# Patient Record
Sex: FEMALE | Race: WHITE | NOT HISPANIC OR LATINO
[De-identification: names, ages, dates, MRNs, and addresses within clinical notes are randomized per-mention and may not be internally consistent; named-entity substitution may affect disease eponyms.]

---

## 2017-12-14 ENCOUNTER — APPOINTMENT (OUTPATIENT)
Dept: OTOLARYNGOLOGY | Facility: CLINIC | Age: 72
End: 2017-12-14

## 2018-02-08 ENCOUNTER — APPOINTMENT (OUTPATIENT)
Dept: OTOLARYNGOLOGY | Facility: CLINIC | Age: 73
End: 2018-02-08

## 2018-06-04 ENCOUNTER — APPOINTMENT (OUTPATIENT)
Dept: OTOLARYNGOLOGY | Facility: CLINIC | Age: 73
End: 2018-06-04
Payer: MEDICARE

## 2018-06-04 VITALS
DIASTOLIC BLOOD PRESSURE: 73 MMHG | SYSTOLIC BLOOD PRESSURE: 117 MMHG | WEIGHT: 130 LBS | HEIGHT: 63 IN | BODY MASS INDEX: 23.04 KG/M2 | HEART RATE: 63 BPM

## 2018-06-04 DIAGNOSIS — J31.0 CHRONIC RHINITIS: ICD-10-CM

## 2018-06-04 DIAGNOSIS — J34.2 DEVIATED NASAL SEPTUM: ICD-10-CM

## 2018-06-04 DIAGNOSIS — H69.82 OTHER SPECIFIED DISORDERS OF EUSTACHIAN TUBE, LEFT EAR: ICD-10-CM

## 2018-06-04 DIAGNOSIS — H90.3 SENSORINEURAL HEARING LOSS, BILATERAL: ICD-10-CM

## 2018-06-04 DIAGNOSIS — H61.23 IMPACTED CERUMEN, BILATERAL: ICD-10-CM

## 2018-06-04 PROCEDURE — 69210 REMOVE IMPACTED EAR WAX UNI: CPT

## 2018-06-04 PROCEDURE — 92557 COMPREHENSIVE HEARING TEST: CPT

## 2018-06-04 PROCEDURE — 31231 NASAL ENDOSCOPY DX: CPT

## 2018-06-04 PROCEDURE — 99204 OFFICE O/P NEW MOD 45 MIN: CPT | Mod: 25

## 2018-06-04 PROCEDURE — 92567 TYMPANOMETRY: CPT

## 2019-07-31 ENCOUNTER — APPOINTMENT (OUTPATIENT)
Dept: ORTHOPEDIC SURGERY | Facility: CLINIC | Age: 74
End: 2019-07-31
Payer: MEDICARE

## 2019-07-31 DIAGNOSIS — Z96.651 PRESENCE OF RIGHT ARTIFICIAL KNEE JOINT: ICD-10-CM

## 2019-07-31 DIAGNOSIS — M25.561 PAIN IN RIGHT KNEE: ICD-10-CM

## 2019-07-31 DIAGNOSIS — M25.562 PAIN IN RIGHT KNEE: ICD-10-CM

## 2019-07-31 DIAGNOSIS — M17.12 UNILATERAL PRIMARY OSTEOARTHRITIS, LEFT KNEE: ICD-10-CM

## 2019-07-31 PROCEDURE — 99204 OFFICE O/P NEW MOD 45 MIN: CPT

## 2019-07-31 PROCEDURE — 73564 X-RAY EXAM KNEE 4 OR MORE: CPT | Mod: LT

## 2019-07-31 PROCEDURE — 73562 X-RAY EXAM OF KNEE 3: CPT | Mod: RT

## 2019-07-31 NOTE — ADDENDUM
[FreeTextEntry1] : This note was written by Lexy Freedman on 07/31/2019 acting as scribe for Dr. Rajiv Vidales M.D.\par \par I, Dr. Rajiv Vidales M.D., have read and attest that all the information, medical decision making and discharge instructions within are true and accurate.\par

## 2019-07-31 NOTE — PHYSICAL EXAM
[de-identified] : General appearance: well nourished and hydrated, pleasant, alert and oriented x 3, cooperative.\par HEENT: Normocephalic, EOM intact, Nasal septum midline, Oral cavity clear, External auditory canal clear.\par Cardiovascular: no apparent abnormalities, no lower leg edema, no varicosities, pedal pulses are palpable.\par Lymphatics Lymph nodes: none palpated, Lymphedema: not present.\par Neurologic: sensation is normal, no muscle weakness in upper or lower extremities, patella tendon reflexes intact .\par Dermatologic: multiple ecchymosis of the upper and lower extremities, otherwise no apparent skin lesions, moist, warm, no rash.\par Spine: cervical spine appears normal and moves freely, thoracic spine appears normal and moves freely, limited mobility at lumbosacral spine with tenderness\par Gait: nonantalgic.\par \par Left knee\par Inspection: no effusion or erythema.\par Wounds: none.\par Alignment: normal.\par Palpation: medial tenderness on palpation.\par ROM active (in degrees): 5 degree flexion contracture, 5-140 with crepitus \par Ligamentous laxity: all ligaments appear stable,, negative ant. drawer test, negative post. drawer test, stable to varus stress test, stable to valgus stress test. negative Lachman's test, negative pivot shift test\par Meniscal Test: negative McMurrays, negative Shane.\par Patellofemoral Alignment Test: Q angle-, normal.\par Muscle Test: good quad strength.\par Leg examination: calf is soft and non-tender.\par \par Right knee\par Inspection: no effusion or erythema.\par Wounds: healed midline incision \par Alignment: normal.\par Palpation: no specific tenderness on palpation.\par ROM active (in degrees): 0-130 \par Ligamentous laxity: stable in full extension, stable to varus and valgus stress, no instability or ligamentous laxity noted. \par Patellofemoral Alignment Test: Q angle-, normal.\par Muscle Test: good quad strength.\par Leg examination: calf is soft and non-tender.\par \par Left hip\par Inspection: No swelling or ecchymosis.\par Wounds: none.\par Palpation: non-tender.\par Stability: no instability.\par Strength: 5/5 all motor groups.\par ROM: no pain with FROM.\par Leg length: equal.\par \par Right hip\par Inspection: No swelling or ecchymosis.\par Wounds: none.\par Palpation: non-tender.\par Stability: no instability.\par Strength: 5/5 all motor groups.\par ROM: no pain with FROM.\par Leg length: equal.\par \par Left ankle\par Inspection: no erythema noted, no swelling noted.\par Palpation: no pain on palpation .\par ROM: FROM without crepitus.\par Muscle strength: 5/5.\par Stability: no instability noted.\par \par Right ankle\par Inspection: no erythema noted, no swelling noted.\par ROM: FROM without crepitus.\par Palpation: no pain on palpation .\par Muscle strength: 5/5.\par Stability: no instability noted.\par \par Left foot\par Inspection: hammer toes at number 2 and 3, otherwise color, texture and turgor are normal.\par ROM: full range of motion of all joints without pain or crepitus.\par Palpation: no tenderness.\par Stability: no instability noted.\par \par Right foot\par Inspection: hammer toe #2 with crossover,  otherwise color, texture and turgor are normal.\par ROM: limited PIP joint movement of the second toe,otherwise full range of motion of all joints without pain or crepitus.\par Palpation: no tenderness.\par Stability: no instability noted.\par \par bilateral feet: hallux valgus \par \par Left shoulder\par Inspection: no muscle asymmetry, no atrophy.\par Palpation: no tenderness noted, ACJ non-tender.\par ROM:. limited total elevation, pain with overhead movement\par Strength testing): weakness to external rotation.\par Stability test: ant. apprehension negative, post. apprehension negative, relocation test negative.\par Impingement Test: positive NEER.\par \par Right shoulder\par Inspection: no muscle asymmetry, no atrophy.\par Palpation: no tenderness noted, ACJ non-tender.\par ROM: limited total elevation\par Strength testing): anterior deltoid, supraspinatus, infraspinatus, subscapularis all 5/5.\par Stability test: ant. apprehension negative, post. apprehension negative, relocation test negative.\par Impingement Test: negative NEER.\par Surgical Wounds: none.\par \par Left elbow\par Inspection: negative swelling.\par Wounds: none.\par Palpation: non-tender.\par ROM: full ROM.\par Strength: 5/5 all groups.\par Stability: no instability.\par Mass: none.\par \par Right elbow\par Inspection: negative swelling.\par Wounds: none.\par Palpation: non-tender.\par ROM: full ROM.\par Strength: 5/5 all groups.\par Stability: no instability.\par Mass: none.\par \par Left wrist\par Inspection: negative swelling.\par Wound: none.\par Palpation (bone): no tenderness.\par ROM: full ROM.\par Strength: full , good.\par \par Right wrist\par Inspection: negative swelling.\par Wound: none.\par Palpation (bone): no tenderness.\par ROM: full ROM.\par Strength: full , good.\par \par Left hand\par Inspection: no skin changes, normal appearance.\par Wounds: none.\par Strength: full , able to make full fist.\par Sensation: light touch intact all fingers and thumb.\par Vascular: good capillary refill < 3 seconds, all fingers and thumb.\par Mass: none.\par \par Right hand\par Inspection: no skin changes, normal appearance. \par Wounds: none.\par Palpation: non-tender throughout.\par Strength: full , able to make full fist.\par Sensation: light touch intact all fingers and thumb.\par Vascular: good capillary refill < 3 seconds, all fingers and thumb.\par Mass: none. [de-identified] : Left knee xrays, standing AP/Lateral, Merchant, and 45 degree PA standing view, taken at the office today shows diffuse degenerative arthritis, lateral joint space narrowing, sclerosis especially on the Newton view , bone on bone, marginal osteophytes, patella sits at an appropriate height in a central position with joint space narrowing and osteophyte formation, Kellgren and Spencer grade 3-4,  enchondroma or bone infarct in metaphysis, linear appearance \par \par Right knee xrays, AP, lateral, merchant view taken at the office today demonstrates a total knee replacement in satisfactory position and alignment. No evidence of loosening. The patella sits in a central position

## 2019-07-31 NOTE — HISTORY OF PRESENT ILLNESS
[de-identified] : 73 year old female presents for initial evaluation of her bilateral knees. She had a right TKR 15 years ago at [facility], and states the surgeon no longer practices. She states her right knee is overall doing well aside from occasional achiness. Her main complaint is left knee pain for the past 6 months. Patient denies any specific injury. She describes anterior knee pain with activities like stairs and walking. She states her walking is limited due to both her left knee pain and lower back pain due to spinal stenosis. She endorses buckling clicking, and locking but denies any other mechanical symptoms. Patient takes the stairs one at a time using the handrail. She uses ibuprofen 800 mg daily, and has no history of left knee treatment. Patient uses Oxycodone prn for her back. She has been under care of a spine specialist and is not a surgical candidate. She has tried epidural injections, PT, spine stimulators, as well as pain management with limited improvement of her back pain. Today, she would like to discuss her treatment options with Dr. Vidales for her left knee.

## 2019-07-31 NOTE — DISCUSSION/SUMMARY
[de-identified] : Discussed at length the nature of the patients condition. Her right TKR done at [facility] is doing well now at 15 years. I reviewed x-ray films with them and I have reassured her that her implants are functioning well. Their left knee symptoms appear secondary to degenerative arthritis. We discussed at length the natural history of LEFT knee degenerative arthritis and reviewed non-operative and operative treatment. Due to the pain, failure of prior nonoperative treatment including injections, NSAIDs, and physiotherapy, and associated disability I recommend a LEFT total knee replacement. The risks, benefits, convalescence and alternatives were reviewed. Numerous questions were asked and answered. Models were used as an educational tool. We did discuss implant choice and fixation, with shared decision making with the patient. Surgery will be scheduled at a convenient time. Preop medical clearance. This was explained in the presence of their . I have referred her to Dr. Ambrose for evaluation of her left shoulder. I have referred her to Dr. López for evaluation of her feet.

## 2019-08-16 ENCOUNTER — APPOINTMENT (OUTPATIENT)
Dept: ORTHOPEDIC SURGERY | Facility: CLINIC | Age: 74
End: 2019-08-16
Payer: MEDICARE

## 2019-08-16 DIAGNOSIS — M25.511 PAIN IN RIGHT SHOULDER: ICD-10-CM

## 2019-08-16 DIAGNOSIS — G89.29 PAIN IN RIGHT SHOULDER: ICD-10-CM

## 2019-08-16 DIAGNOSIS — M25.512 PAIN IN LEFT SHOULDER: ICD-10-CM

## 2019-08-16 DIAGNOSIS — G89.29 PAIN IN LEFT SHOULDER: ICD-10-CM

## 2019-08-16 PROCEDURE — 73030 X-RAY EXAM OF SHOULDER: CPT | Mod: 50

## 2019-08-16 PROCEDURE — 20610 DRAIN/INJ JOINT/BURSA W/O US: CPT | Mod: 50

## 2019-08-16 PROCEDURE — 99213 OFFICE O/P EST LOW 20 MIN: CPT | Mod: 25

## 2019-08-16 NOTE — HISTORY OF PRESENT ILLNESS
[de-identified] : 74yo female presents complaining of bilateral shoulder pain, right for many years, left for approximately 3 months. Left worse than right. She has pain and weakness in both shoulders. She cannot hold her arms up for that long because of weakness in her shoulders. She has tried anti-inflammatory medicine, months of physical therapy. She has also tried medical marijuana without relief. Overall pain is worsening. No injuries. Denies numbness tingling\par She states she had right shoulder surgery in the city 5 years ago. This involved an arthroscopy but she states they were unable to repair anything.\par \par The patient's past medical history, past surgical history, medications, allergies, and social history were reviewed by me today with the patient and documented accordingly. In addition, the patient's family history, which is noncontributory to this visit, was also reviewed.\par

## 2019-08-16 NOTE — DISCUSSION/SUMMARY
[de-identified] : \par 73 year-old female rotator cuff arthropathy on the right side likely rotator cuff pathology on the left side we discussed diagnostic treatment options. Given her temporary relief at this time for upcoming knee replacement surgery\par \par Injection: Right shoulder (Subacromial).\par Indication: Rotator cuff arthropathy\par A discussion was had with the patient regarding this procedure and all questions were answered. All risks, benefits and alternatives were discussed. These included but were not limited to bleeding, infection, and allergic reaction. Alcohol was used to clean the skin, and betadine was used to sterilize and prep the area in the posterior aspect of the right shoulder. Ethyl chloride spray was then used as a topical anesthetic. A 21-gauge needle was used to inject 4cc of 1% lidocaine and 1cc of 40mg/ml methylprednisolone into the right subacromial space. A sterile bandage was then applied. The patient tolerated the procedure well and there were no complications. \par \par Injection: Left shoulder (Subacromial).\par Indication: Impingement likely rotator cuff tear\par A discussion was had with the patient regarding this procedure and all questions were answered. All risks, benefits and alternatives were discussed. These included but were not limited to bleeding, infection, and allergic reaction. Alcohol was used to clean the skin, and betadine was used to sterilize and prep the area in the posterior aspect of the left shoulder. Ethyl chloride spray was then used as a topical anesthetic. A 21-gauge needle was used to inject 4cc of 1% lidocaine and 1cc of 40mg/ml methylprednisolone into the left subacromial space. A sterile bandage was then applied. The patient tolerated the procedure well and there were no complications. \par \par She will do physical therapy program she will followup as needed consider MRI left shoulder reverse shoulder arthroplasty due to surgical option for her right shoulder all questions answered

## 2019-09-08 ENCOUNTER — TRANSCRIPTION ENCOUNTER (OUTPATIENT)
Age: 74
End: 2019-09-08

## 2020-01-13 ENCOUNTER — OUTPATIENT (OUTPATIENT)
Dept: OUTPATIENT SERVICES | Facility: HOSPITAL | Age: 75
LOS: 1 days | Discharge: HOME | End: 2020-01-13
Payer: MEDICARE

## 2020-01-13 VITALS
OXYGEN SATURATION: 97 % | SYSTOLIC BLOOD PRESSURE: 123 MMHG | TEMPERATURE: 99 F | WEIGHT: 132.72 LBS | DIASTOLIC BLOOD PRESSURE: 65 MMHG | HEART RATE: 69 BPM | HEIGHT: 63 IN | RESPIRATION RATE: 17 BRPM

## 2020-01-13 DIAGNOSIS — Z96.651 PRESENCE OF RIGHT ARTIFICIAL KNEE JOINT: Chronic | ICD-10-CM

## 2020-01-13 DIAGNOSIS — Z01.818 ENCOUNTER FOR OTHER PREPROCEDURAL EXAMINATION: ICD-10-CM

## 2020-01-13 DIAGNOSIS — F41.9 ANXIETY DISORDER, UNSPECIFIED: ICD-10-CM

## 2020-01-13 DIAGNOSIS — M17.12 UNILATERAL PRIMARY OSTEOARTHRITIS, LEFT KNEE: ICD-10-CM

## 2020-01-13 DIAGNOSIS — M85.80 OTHER SPECIFIED DISORDERS OF BONE DENSITY AND STRUCTURE, UNSPECIFIED SITE: ICD-10-CM

## 2020-01-13 DIAGNOSIS — Z98.890 OTHER SPECIFIED POSTPROCEDURAL STATES: Chronic | ICD-10-CM

## 2020-01-13 LAB
ANION GAP SERPL CALC-SCNC: 12 MMOL/L — SIGNIFICANT CHANGE UP (ref 5–17)
APTT BLD: 30.9 SEC — SIGNIFICANT CHANGE UP (ref 27.5–36.3)
BLD GP AB SCN SERPL QL: SIGNIFICANT CHANGE UP
BUN SERPL-MCNC: 38 MG/DL — HIGH (ref 7–23)
CALCIUM SERPL-MCNC: 9.7 MG/DL — SIGNIFICANT CHANGE UP (ref 8.5–10.1)
CHLORIDE SERPL-SCNC: 109 MMOL/L — HIGH (ref 96–108)
CO2 SERPL-SCNC: 20 MMOL/L — LOW (ref 22–31)
CREAT SERPL-MCNC: 1.37 MG/DL — HIGH (ref 0.5–1.3)
GLUCOSE SERPL-MCNC: 76 MG/DL — SIGNIFICANT CHANGE UP (ref 70–99)
HBA1C BLD-MCNC: 5.6 % — SIGNIFICANT CHANGE UP (ref 4–5.6)
HCT VFR BLD CALC: 39 % — SIGNIFICANT CHANGE UP (ref 34.5–45)
HGB BLD-MCNC: 12.9 G/DL — SIGNIFICANT CHANGE UP (ref 11.5–15.5)
INR BLD: 1 RATIO — SIGNIFICANT CHANGE UP (ref 0.88–1.16)
MCHC RBC-ENTMCNC: 29.3 PG — SIGNIFICANT CHANGE UP (ref 27–34)
MCHC RBC-ENTMCNC: 33.1 GM/DL — SIGNIFICANT CHANGE UP (ref 32–36)
MCV RBC AUTO: 88.6 FL — SIGNIFICANT CHANGE UP (ref 80–100)
MRSA PCR RESULT.: SIGNIFICANT CHANGE UP
NRBC # BLD: 0 /100 WBCS — SIGNIFICANT CHANGE UP (ref 0–0)
PLATELET # BLD AUTO: 271 K/UL — SIGNIFICANT CHANGE UP (ref 150–400)
POTASSIUM SERPL-MCNC: 3.9 MMOL/L — SIGNIFICANT CHANGE UP (ref 3.5–5.3)
POTASSIUM SERPL-SCNC: 3.9 MMOL/L — SIGNIFICANT CHANGE UP (ref 3.5–5.3)
PROTHROM AB SERPL-ACNC: 11.2 SEC — SIGNIFICANT CHANGE UP (ref 10–12.9)
RBC # BLD: 4.4 M/UL — SIGNIFICANT CHANGE UP (ref 3.8–5.2)
RBC # FLD: 14.2 % — SIGNIFICANT CHANGE UP (ref 10.3–14.5)
S AUREUS DNA NOSE QL NAA+PROBE: SIGNIFICANT CHANGE UP
SODIUM SERPL-SCNC: 141 MMOL/L — SIGNIFICANT CHANGE UP (ref 135–145)
WBC # BLD: 8.58 K/UL — SIGNIFICANT CHANGE UP (ref 3.8–10.5)
WBC # FLD AUTO: 8.58 K/UL — SIGNIFICANT CHANGE UP (ref 3.8–10.5)

## 2020-01-13 PROCEDURE — 93010 ELECTROCARDIOGRAM REPORT: CPT

## 2020-01-13 RX ORDER — SODIUM CHLORIDE 9 MG/ML
3 INJECTION INTRAMUSCULAR; INTRAVENOUS; SUBCUTANEOUS EVERY 8 HOURS
Refills: 0 | Status: DISCONTINUED | OUTPATIENT
Start: 2020-01-24 | End: 2020-01-26

## 2020-01-13 NOTE — H&P PST ADULT - NSICDXPASTMEDICALHX_GEN_ALL_CORE_FT
PAST MEDICAL HISTORY:  Anxiety     H/O scoliosis     Hard of hearing     Osteopenia     Spinal stenosis     SPL (spondylolisthesis)

## 2020-01-13 NOTE — H&P PST ADULT - NSICDXPROBLEM_GEN_ALL_CORE_FT
PROBLEM DIAGNOSES  Problem: Unilateral primary osteoarthritis, left knee  Assessment and Plan: Left total knee arthroplasty with MONALISA robotic assistance  labs - cbc,pt/ptt,bmp,t&s,nose cx,ekg  M/C required -- Pending  preop 3 day hibiclens instruction reviewed and given .instructed on if  nose cx positive use mupuricin 5 days and checklist given  take routine meds DOS with sips of water. avoid NSAID and OTC supplements. verbalized understanding  information on proper nutrition , increase protein and better food choices provided in packet   Ensure clear given    Problem: Anxiety  Assessment and Plan: Continue current regimen and medications.     Problem: Osteopenia  Assessment and Plan: Continue current regimen and medications.

## 2020-01-13 NOTE — H&P PST ADULT - ASSESSMENT
74F pmh anxiety, osteopenia, b/o left knee pain found to have unilateral primary osteoarthritis of left knee here for PST for scheduled Left total knee arthroplasty  CAPRINI SCORE    AGE RELATED RISK FACTORS                                                       MOBILITY RELATED FACTORS  [ ] Age 41-60 years                                            (1 Point)                  [ ] Bed rest                                                        (1 Point)  [ ] Age: 61-74 years                                           (2 Points)                [ ] Plaster cast                                                   (2 Points)  [x ] Age= 75 years                                              (3 Points)                 [ ] Bed bound for more than 72 hours                   (2 Points)    DISEASE RELATED RISK FACTORS                                               GENDER SPECIFIC FACTORS  [ ] Edema in the lower extremities                       (1 Point)                  [ ] Pregnancy                                                     (1 Point)  [ ] Varicose veins                                               (1 Point)                  [ ] Post-partum < 6 weeks                                   (1 Point)             [ ] BMI > 25 Kg/m2                                            (1 Point)                  [ ] Hormonal therapy  or oral contraception            (1 Point)                 [ ] Sepsis (in the previous month)                        (1 Point)                  [ ] History of pregnancy complications  [ ] Pneumonia or serious lung disease                                               [ ] Unexplained or recurrent                       (1 Point)           (in the previous month)                               (1 Point)  [ ] Abnormal pulmonary function test                     (1 Point)                 SURGERY RELATED RISK FACTORS  [ ] Acute myocardial infarction                              (1 Point)                 [ ]  Section                                            (1 Point)  [ ] Congestive heart failure (in the previous month)  (1 Point)                 [ ] Minor surgery                                                 (1 Point)   [ ] Inflammatory bowel disease                             (1 Point)                 [ ] Arthroscopic surgery                                        (2 Points)  [ ] Central venous access                                    (2 Points)                [ ] General surgery lasting more than 45 minutes   (2 Points)       [ ] Stroke (in the previous month)                          (5 Points)               [x ] Elective arthroplasty                                        (5 Points)                                                                                                                                               HEMATOLOGY RELATED FACTORS                                                 TRAUMA RELATED RISK FACTORS  [ ] Prior episodes of VTE                                     (3 Points)                 [ ] Fracture of the hip, pelvis, or leg                       (5 Points)  [ ] Positive family history for VTE                         (3 Points)                 [ ] Acute spinal cord injury (in the previous month)  (5 Points)  [ ] Prothrombin 73535 A                                      (3 Points)                 [ ] Paralysis  (less than 1 month)                          (5 Points)  [ ] Factor V Leiden                                             (3 Points)                 [ ] Multiple Trauma within 1 month                         (5 Points)  [ ] Lupus anticoagulants                                     (3 Points)                                                           [ ] Anticardiolipin antibodies                                (3 Points)                                                       [ ] High homocysteine in the blood                      (3 Points)                                             [ ] Other congenital or acquired thrombophilia       (3 Points)                                                [ ] Heparin induced thrombocytopenia                  (3 Points)                                          Total Score [  8        ]

## 2020-01-13 NOTE — PHYSICAL THERAPY INITIAL EVALUATION ADULT - MODIFIED CLINICAL TEST OF SENSORY INTEGRATION IN BALANCE TEST
5x sit to stand: 22.67 seconds. 2MWT: 200ft with straight cane (decreased arm swing on L, + trendelenberg, B/L varus, B/L knee flexion in stance). + UNDERWOOD. Stairs: B/L rails step to step for ascent. Backwards with B/L rails for descent.

## 2020-01-13 NOTE — PHYSICAL THERAPY INITIAL EVALUATION ADULT - ADDITIONAL COMMENTS
Pt lives with spouse Steve (whom can provide assist post-op, + recent spinal surgery), 6 entry steps (+ R rail for ascent), 6 steps (+ R rail), landing, additional 6 steps (+ R rail), landing, then additional 6 steps (+ R rail) landing to basement. Pt will avoid basement post-op. Pt is independent with all functional mobility including community ambulation. Pt uses rolling walker on main floor, straight cane in community, & uses electric scooter when traveling. Pt performs all ADL's independently. Pt has a walk in shower stall, + grab bars, retractable shower head, & standard toilet seat height. Pt states 3:1 commode can fit over toilet. Pt states scooter is 2/2 lower back pain (spondy & scoliosis). Pt states she has no pain at rest & L knee pain is 5-6/10 with activity. Pain is worse with prolonged standing & walking. Pt is better with rest & she takes Ibuprofen. Pt attends outpatient PT 2x/week, denies recent falls, but endorses buckling. Cont below...

## 2020-01-13 NOTE — H&P PST ADULT - HISTORY OF PRESENT ILLNESS
Looking forward to walking better 74F pmh anxiety, osteopenia, b/o left knee pain found to have unilateral primary osteoarthritis of left knee here for PST for scheduled Left total knee arthroplasty  Looking forward to walking better

## 2020-01-22 RX ORDER — MAGNESIUM HYDROXIDE 400 MG/1
30 TABLET, CHEWABLE ORAL DAILY
Refills: 0 | Status: DISCONTINUED | OUTPATIENT
Start: 2020-01-24 | End: 2020-01-26

## 2020-01-22 RX ORDER — OXYCODONE HYDROCHLORIDE 5 MG/1
10 TABLET ORAL EVERY 4 HOURS
Refills: 0 | Status: DISCONTINUED | OUTPATIENT
Start: 2020-01-24 | End: 2020-01-26

## 2020-01-22 RX ORDER — RIVAROXABAN 15 MG-20MG
10 KIT ORAL DAILY
Refills: 0 | Status: DISCONTINUED | OUTPATIENT
Start: 2020-01-24 | End: 2020-01-26

## 2020-01-22 RX ORDER — PANTOPRAZOLE SODIUM 20 MG/1
40 TABLET, DELAYED RELEASE ORAL
Refills: 0 | Status: DISCONTINUED | OUTPATIENT
Start: 2020-01-24 | End: 2020-01-26

## 2020-01-22 RX ORDER — MORPHINE SULFATE 50 MG/1
4 CAPSULE, EXTENDED RELEASE ORAL ONCE
Refills: 0 | Status: DISCONTINUED | OUTPATIENT
Start: 2020-01-24 | End: 2020-01-26

## 2020-01-22 RX ORDER — DIPHENHYDRAMINE HCL 50 MG
25 CAPSULE ORAL AT BEDTIME
Refills: 0 | Status: DISCONTINUED | OUTPATIENT
Start: 2020-01-24 | End: 2020-01-26

## 2020-01-22 RX ORDER — MORPHINE SULFATE 50 MG/1
2 CAPSULE, EXTENDED RELEASE ORAL EVERY 4 HOURS
Refills: 0 | Status: DISCONTINUED | OUTPATIENT
Start: 2020-01-24 | End: 2020-01-26

## 2020-01-22 RX ORDER — SENNA PLUS 8.6 MG/1
2 TABLET ORAL AT BEDTIME
Refills: 0 | Status: DISCONTINUED | OUTPATIENT
Start: 2020-01-24 | End: 2020-01-26

## 2020-01-22 RX ORDER — POLYETHYLENE GLYCOL 3350 17 G/17G
17 POWDER, FOR SOLUTION ORAL DAILY
Refills: 0 | Status: DISCONTINUED | OUTPATIENT
Start: 2020-01-24 | End: 2020-01-26

## 2020-01-22 RX ORDER — ONDANSETRON 8 MG/1
4 TABLET, FILM COATED ORAL EVERY 6 HOURS
Refills: 0 | Status: DISCONTINUED | OUTPATIENT
Start: 2020-01-24 | End: 2020-01-26

## 2020-01-23 ENCOUNTER — TRANSCRIPTION ENCOUNTER (OUTPATIENT)
Age: 75
End: 2020-01-23

## 2020-01-24 ENCOUNTER — RESULT REVIEW (OUTPATIENT)
Age: 75
End: 2020-01-24

## 2020-01-24 ENCOUNTER — TRANSCRIPTION ENCOUNTER (OUTPATIENT)
Age: 75
End: 2020-01-24

## 2020-01-24 ENCOUNTER — INPATIENT (INPATIENT)
Facility: HOSPITAL | Age: 75
LOS: 1 days | Discharge: HOME | End: 2020-01-26
Attending: ORTHOPAEDIC SURGERY | Admitting: ORTHOPAEDIC SURGERY
Payer: MEDICARE

## 2020-01-24 VITALS
WEIGHT: 132.72 LBS | OXYGEN SATURATION: 98 % | SYSTOLIC BLOOD PRESSURE: 133 MMHG | TEMPERATURE: 98 F | RESPIRATION RATE: 17 BRPM | HEIGHT: 63 IN | DIASTOLIC BLOOD PRESSURE: 58 MMHG | HEART RATE: 80 BPM

## 2020-01-24 DIAGNOSIS — Z98.49 CATARACT EXTRACTION STATUS, UNSPECIFIED EYE: Chronic | ICD-10-CM

## 2020-01-24 DIAGNOSIS — Z98.890 OTHER SPECIFIED POSTPROCEDURAL STATES: Chronic | ICD-10-CM

## 2020-01-24 DIAGNOSIS — Z96.651 PRESENCE OF RIGHT ARTIFICIAL KNEE JOINT: Chronic | ICD-10-CM

## 2020-01-24 LAB
HCT VFR BLD CALC: 34.5 % — SIGNIFICANT CHANGE UP (ref 34.5–45)
HGB BLD-MCNC: 10.8 G/DL — LOW (ref 11.5–15.5)
MCHC RBC-ENTMCNC: 28.9 PG — SIGNIFICANT CHANGE UP (ref 27–34)
MCHC RBC-ENTMCNC: 31.3 GM/DL — LOW (ref 32–36)
MCV RBC AUTO: 92.2 FL — SIGNIFICANT CHANGE UP (ref 80–100)
NRBC # BLD: 0 /100 WBCS — SIGNIFICANT CHANGE UP (ref 0–0)
PLATELET # BLD AUTO: 152 K/UL — SIGNIFICANT CHANGE UP (ref 150–400)
RBC # BLD: 3.74 M/UL — LOW (ref 3.8–5.2)
RBC # FLD: 14.8 % — HIGH (ref 10.3–14.5)
WBC # BLD: 8.86 K/UL — SIGNIFICANT CHANGE UP (ref 3.8–10.5)
WBC # FLD AUTO: 8.86 K/UL — SIGNIFICANT CHANGE UP (ref 3.8–10.5)

## 2020-01-24 PROCEDURE — 73560 X-RAY EXAM OF KNEE 1 OR 2: CPT | Mod: 26,LT

## 2020-01-24 PROCEDURE — 88305 TISSUE EXAM BY PATHOLOGIST: CPT | Mod: 26

## 2020-01-24 PROCEDURE — 88311 DECALCIFY TISSUE: CPT | Mod: 26

## 2020-01-24 RX ORDER — SERTRALINE 25 MG/1
150 TABLET, FILM COATED ORAL DAILY
Refills: 0 | Status: DISCONTINUED | OUTPATIENT
Start: 2020-01-24 | End: 2020-01-24

## 2020-01-24 RX ORDER — RIVAROXABAN 15 MG-20MG
1 KIT ORAL
Qty: 0 | Refills: 0 | DISCHARGE
Start: 2020-01-24 | End: 2020-02-05

## 2020-01-24 RX ORDER — SODIUM CHLORIDE 9 MG/ML
1000 INJECTION, SOLUTION INTRAVENOUS
Refills: 0 | Status: DISCONTINUED | OUTPATIENT
Start: 2020-01-24 | End: 2020-01-24

## 2020-01-24 RX ORDER — HYDROMORPHONE HYDROCHLORIDE 2 MG/ML
1 INJECTION INTRAMUSCULAR; INTRAVENOUS; SUBCUTANEOUS
Refills: 0 | Status: DISCONTINUED | OUTPATIENT
Start: 2020-01-24 | End: 2020-01-24

## 2020-01-24 RX ORDER — HYDROMORPHONE HYDROCHLORIDE 2 MG/ML
0.5 INJECTION INTRAMUSCULAR; INTRAVENOUS; SUBCUTANEOUS
Refills: 0 | Status: DISCONTINUED | OUTPATIENT
Start: 2020-01-24 | End: 2020-01-24

## 2020-01-24 RX ORDER — TRANEXAMIC ACID 100 MG/ML
1000 INJECTION, SOLUTION INTRAVENOUS ONCE
Refills: 0 | Status: COMPLETED | OUTPATIENT
Start: 2020-01-24 | End: 2020-01-24

## 2020-01-24 RX ORDER — CEFAZOLIN SODIUM 1 G
2000 VIAL (EA) INJECTION EVERY 8 HOURS
Refills: 0 | Status: COMPLETED | OUTPATIENT
Start: 2020-01-24 | End: 2020-01-24

## 2020-01-24 RX ORDER — ACETAMINOPHEN 500 MG
1000 TABLET ORAL ONCE
Refills: 0 | Status: COMPLETED | OUTPATIENT
Start: 2020-01-24 | End: 2020-01-24

## 2020-01-24 RX ORDER — SERTRALINE 25 MG/1
150 TABLET, FILM COATED ORAL DAILY
Refills: 0 | Status: DISCONTINUED | OUTPATIENT
Start: 2020-01-24 | End: 2020-01-26

## 2020-01-24 RX ORDER — METOCLOPRAMIDE HCL 10 MG
10 TABLET ORAL ONCE
Refills: 0 | Status: DISCONTINUED | OUTPATIENT
Start: 2020-01-24 | End: 2020-01-24

## 2020-01-24 RX ORDER — CEFAZOLIN SODIUM 1 G
1000 VIAL (EA) INJECTION EVERY 8 HOURS
Refills: 0 | Status: DISCONTINUED | OUTPATIENT
Start: 2020-01-24 | End: 2020-01-24

## 2020-01-24 RX ORDER — ACETAMINOPHEN 500 MG
650 TABLET ORAL ONCE
Refills: 0 | Status: COMPLETED | OUTPATIENT
Start: 2020-01-24 | End: 2020-01-24

## 2020-01-24 RX ORDER — SODIUM CHLORIDE 9 MG/ML
1000 INJECTION, SOLUTION INTRAVENOUS
Refills: 0 | Status: DISCONTINUED | OUTPATIENT
Start: 2020-01-24 | End: 2020-01-25

## 2020-01-24 RX ORDER — OXYCODONE HYDROCHLORIDE 5 MG/1
10 TABLET ORAL ONCE
Refills: 0 | Status: DISCONTINUED | OUTPATIENT
Start: 2020-01-24 | End: 2020-01-24

## 2020-01-24 RX ADMIN — OXYCODONE HYDROCHLORIDE 10 MILLIGRAM(S): 5 TABLET ORAL at 20:36

## 2020-01-24 RX ADMIN — OXYCODONE HYDROCHLORIDE 10 MILLIGRAM(S): 5 TABLET ORAL at 17:23

## 2020-01-24 RX ADMIN — Medication 1000 MILLIGRAM(S): at 12:01

## 2020-01-24 RX ADMIN — OXYCODONE HYDROCHLORIDE 10 MILLIGRAM(S): 5 TABLET ORAL at 21:30

## 2020-01-24 RX ADMIN — OXYCODONE HYDROCHLORIDE 10 MILLIGRAM(S): 5 TABLET ORAL at 16:23

## 2020-01-24 RX ADMIN — Medication 100 MILLIGRAM(S): at 23:52

## 2020-01-24 RX ADMIN — Medication 650 MILLIGRAM(S): at 07:20

## 2020-01-24 RX ADMIN — SODIUM CHLORIDE 100 MILLILITER(S): 9 INJECTION, SOLUTION INTRAVENOUS at 17:33

## 2020-01-24 RX ADMIN — OXYCODONE HYDROCHLORIDE 10 MILLIGRAM(S): 5 TABLET ORAL at 07:24

## 2020-01-24 RX ADMIN — SODIUM CHLORIDE 75 MILLILITER(S): 9 INJECTION, SOLUTION INTRAVENOUS at 11:13

## 2020-01-24 RX ADMIN — Medication 400 MILLIGRAM(S): at 11:31

## 2020-01-24 RX ADMIN — TRANEXAMIC ACID 220 MILLIGRAM(S): 100 INJECTION, SOLUTION INTRAVENOUS at 11:13

## 2020-01-24 RX ADMIN — Medication 25 MILLIGRAM(S): at 23:35

## 2020-01-24 RX ADMIN — SERTRALINE 150 MILLIGRAM(S): 25 TABLET, FILM COATED ORAL at 17:32

## 2020-01-24 RX ADMIN — SODIUM CHLORIDE 3 MILLILITER(S): 9 INJECTION INTRAMUSCULAR; INTRAVENOUS; SUBCUTANEOUS at 21:42

## 2020-01-24 RX ADMIN — Medication 100 MILLIGRAM(S): at 16:04

## 2020-01-24 NOTE — DISCHARGE NOTE PROVIDER - NSDCMRMEDTOKEN_GEN_ALL_CORE_FT
calcium carbonate: 1 tab(s) orally once a day  Fish Oil: 1  orally once a day  ibuprofen 800 mg oral tablet: 1 tab(s) orally once a day  omeprazole 20 mg oral delayed release tablet: 1 tab(s) orally once a day  Vitamin D2: 1 tab(s) orally once a day  Zoloft: 150 milligram(s) orally once a day calcium carbonate: 1 tab(s) orally once a day  Fish Oil: 1  orally once a day  omeprazole 20 mg oral delayed release tablet: 1 tab(s) orally once a day  oxyCODONE 5 mg oral tablet: 1-2 tabs orally every 4 hours as needed for pain. 1tab for pain 3-6, 2tabs for pain 7-10 MDD:8 tablets  rivaroxaban 10 mg oral tablet: 1 tab(s) orally once a day MDD:1 tablet  senna oral tablet: 2 tab(s) orally once a day (at bedtime), As needed, Constipation  Vitamin D2: 1 tab(s) orally once a day  Zoloft: 150 milligram(s) orally once a day calcium carbonate: 1 tab(s) orally once a day  celecoxib 200 mg oral capsule: 1 cap(s) orally once a day MDD:1  Fish Oil: 1  orally once a day  omeprazole 20 mg oral delayed release tablet: 1 tab(s) orally once a day  oxyCODONE 5 mg oral tablet: 1-2 tabs orally every 4 hours as needed for pain. 1tab for pain 3-6, 2tabs for pain 7-10 MDD:8 tablets  rivaroxaban 10 mg oral tablet: 1 tab(s) orally once a day MDD:1 tablet  senna oral tablet: 2 tab(s) orally once a day (at bedtime), As needed, Constipation  Vitamin D2: 1 tab(s) orally once a day  Zoloft: 150 milligram(s) orally once a day

## 2020-01-24 NOTE — PHYSICAL THERAPY INITIAL EVALUATION ADULT - ADDITIONAL COMMENTS
Confirmed post-op: Pt lives with spouse Steve (whom can provide assist post-op, + recent spinal surgery), 6 entry steps (+ R rail for ascent), 6 steps (+ R rail), landing, additional 6 steps (+ R rail), landing, then additional 6 steps (+ R rail) landing to basement. Pt will avoid basement post-op. Pt is independent with all functional mobility including community ambulation. Pt uses rolling walker (now states its a rollator) on main floor, straight cane in community, & uses electric scooter when traveling. Pt performs all ADL's independently. Pt has a walk in shower stall, + grab bars, retractable shower head, & standard toilet seat height. Pt states 3:1 commode can fit over toilet. Pt states scooter is 2/2 lower back pain (spondy & scoliosis).

## 2020-01-24 NOTE — BRIEF OPERATIVE NOTE - NSICDXBRIEFPROCEDURE_GEN_ALL_CORE_FT
PROCEDURES:  Arthroplasty, knee, robot-assisted, using MONALISA system 24-Jan-2020 12:24:23  Antionette Rollins

## 2020-01-24 NOTE — PHYSICAL THERAPY INITIAL EVALUATION ADULT - GAIT TRAINING, PT EVAL
Pt will independently ambulate 200feet with rolling walker without loss of balance, by 2-3days. Pt will independently negotiate 24 steps with R rail step to pattern to enter and exit home, by 2-3 weeks

## 2020-01-24 NOTE — PATIENT PROFILE ADULT - NSPROGENBLOODRESTRICT_GEN_A_NUR
Pretty Inman is a 67 year old female here for  Chief Complaint   Patient presents with   • Follow-up     Iron deficiency      Denies latex allergy or sensitivity.    Medication verified and med list updated.  PCP and Pharmacy verified.    Social History     Tobacco Use   Smoking Status Never Smoker   Smokeless Tobacco Never Used     Advance Directives Filed: Yes    ECO - No physically strenuous activity, but ambulatory and able to carry out light or sedentary work.    Height: Yes, shoes off.  Ht Readings from Last 1 Encounters:   19 5' 3\" (1.6 m)     Weight:Yes, shoes off.  Wt Readings from Last 3 Encounters:   10/17/19 81.6 kg   19 88.5 kg   19 88.5 kg       BMI: Body mass index is 31.89 kg/m².    REVIEW OF SYSTEMS  GENERAL:  Patient denies headache, fevers, chills, night sweats, excessive fatigue, dizziness, but complains of: change in appetite and weight loss  ALLERGIC/IMMUNOLOGIC: Verified allergies: Yes  EYES:  Patient denies significant visual difficulties, double vision, blurred vision  ENT/MOUTH: Patient denies problems with hearing, sore throat, sinus drainage, mouth sores  ENDOCRINE:  Patient denies hormone replacement, hot flashes, but complains of: diabetes and thyroid disease  HEMATOLOGIC/LYMPHATIC: Patient denies bleeding, tender lymph nodes, swollen lymph nodes, but complains of: easy bruising  BREASTS: Patient denies abnormal masses of breast, nipple discharge, pain  RESPIRATORY:  Patient denies lung pain with breathing, cough, coughing up blood, shortness of breath  CARDIOVASCULAR:  Patient denies anginal chest pain, palpitations, shortness of breath when lying flat, peripheral edema  GASTROINTESTINAL: Patient denies abdominal pain , vomiting, diarrhea, GI bleeding, change in bowel habits, heartburn, sensation of feeling full, difficulty swallowing, but complains of: nausea and constipation  : Patient denies abnormal genital masses, blood in the urine, frequency, urgency,  burning with urination, hesitancy, incontinence, vaginal bleeding, discharge  MUSCULOSKELETAL:  Patient denies joint pain, joint swelling, redness, decreased range of motion, but complains of: bone pain, pain ratin, location: back  SKIN:  Patient denies chronic rashes, inflammation, ulcerations, skin changes, itching  NEUROLOGIC:  Patient denies loss of balance, areas of focal weakness, abnormal gait, sensory problems, but complains of: numbness toes and tingling toes  PSYCHIATRIC: Patient denies insomnia, depression, anxiety      none

## 2020-01-24 NOTE — DISCHARGE NOTE PROVIDER - NSDCCPTREATMENT_GEN_ALL_CORE_FT
PRINCIPAL PROCEDURE  Procedure: Arthroplasty, knee, robot-assisted, using MONALISA system  Findings and Treatment:

## 2020-01-24 NOTE — DISCHARGE NOTE PROVIDER - HOSPITAL COURSE
74yFemale with history of Left Knee Osteoarthritis presenting for Randall Left TKA  by Dr. Ellis on 1/24/20. Risk and benefits of surgery were explained to the patient.The patient understood and agreed to proceed with surgery. Patient underwent the procedure with no intraoperative complications. Pt was brought in stable condition to the PACU. Once stable in PACU, pt was brought to the floor. During hospital stay pt was followed by Medicine during this admission. Pt had an uneventful hospital course. Pt is stable for discharge to [rehab/home] on POD#[ ] 74yFemale with history of Left Knee Osteoarthritis presenting for Randall Left TKA  by Dr. Ellis on 1/24/20. Risk and benefits of surgery were explained to the patient.The patient understood and agreed to proceed with surgery. Patient underwent the procedure with no intraoperative complications. Pt was brought in stable condition to the PACU. Once stable in PACU, pt was brought to the floor. During hospital stay pt was followed by Medicine during this admission. Pt had an uneventful hospital course. Pt is stable for discharge to homeon POD#1 74yFemale with history of Left Knee Osteoarthritis presenting for Randall Left TKA  by Dr. Ellis on 1/24/20. Risk and benefits of surgery were explained to the patient.The patient understood and agreed to proceed with surgery. Patient underwent the procedure with no intraoperative complications. Pt was brought in stable condition to the PACU. Once stable in PACU, pt was brought to the floor. During hospital stay pt was followed by Medicine during this admission. Pt had an uneventful hospital course. Pt is stable for discharge to homeon POD#2 74yFemale with history of Left Knee Osteoarthritis presenting for Randall Left TKA  by Dr. Ellis on 1/24/20. Risk and benefits of surgery were explained to the patient.The patient understood and agreed to proceed with surgery. Patient underwent the procedure with no intraoperative complications. Pt was brought in stable condition to the PACU. Once stable in PACU, pt was brought to the floor. During hospital stay pt was followed by Medicine PT/OT and home care during this admission. Pt had an uneventful hospital course. Pt is stable for discharge to home on POD#2

## 2020-01-24 NOTE — DISCHARGE NOTE PROVIDER - NSDCFUADDINST_GEN_ALL_CORE_FT
Keep JEET Dressing Clean, Dry and Intact. May shower with JEET Dressing. Please do not scrub, soak, peel or pick at the JEET dressing. No creams, lotions, or oils over dressing. May shower and let water run over dressing, no baths. Pat dry once out of shower. Dressing to be removed in 7 days. If dressing is saturated from border to border - may remove and replace with clean dry dressing Keep JEET Dressing Clean, Dry and Intact. May shower with JEET Dressing. Please do not scrub, soak, peel or pick at the JEET dressing. No creams, lotions, or oils over dressing. May shower and let water run over dressing, no baths. Pat dry once out of shower. Dressing to be removed in 7 days. If dressing is saturated from border to border - may remove and replace with clean dry dressing  Keep knee straight while at rest. Elevate the leg as much as possible ("toes above the nose") to help control swelling. Make sure you get up and take a brief walk every two hours to help with circulation and prevent stiffness. Incentive spirometer 10X/hour. ICe to help with pain/inflammation. Keep JEET Dressing Clean, Dry and Intact. May shower with JEET Dressing. Please do not scrub, soak, peel or pick at the JEET dressing. No creams, lotions, or oils over dressing. May shower and let water run over dressing, no baths. Pat dry once out of shower. Dressing to be removed in 7 days. If dressing is saturated from border to border - may remove and replace with clean dry dressing  Your incision is closed with a special topical stitch called a ZIPline that will be removed in Dr. Ellis's office at follow up.   Keep knee straight while at rest. Elevate the leg as much as possible ("toes above the nose") to help control swelling. Make sure you get up and take a brief walk every two hours to help with circulation and prevent stiffness. Incentive spirometer 10X/hour. ICe to help with pain/inflammation.

## 2020-01-24 NOTE — OCCUPATIONAL THERAPY INITIAL EVALUATION ADULT - PLANNED THERAPY INTERVENTIONS, OT EVAL
ADL retraining/bed mobility training/strengthening/stretching/ROM/balance training/transfer training

## 2020-01-24 NOTE — PROGRESS NOTE ADULT - SUBJECTIVE AND OBJECTIVE BOX
Post-op Check   POD#0 S/P Left TKA   74yFemale Patient seen and examined, Pain controlled  Patient Denies SOB, CP, N/V/D       PE: Left Knee/LE: Dressing C/D/I, Sensation/motor intact, DP 2+, FROM ankle/toes   B/L LE: Skin intact. +ROM hip/knee/ankle/toes. Ankle Dorsi/plantarflexion: 5/5. Calf: soft, compressible and nontender. DP/PT 2+ NVI                           10.8   8.86  )-----------( 152      ( 24 Jan 2020 11:25 )             34.5     A: As above   P: Pain Control       DVT Prophylaxis      Incentive spirometry      PT WBAT LLE      Isometric exercises      Discharge Planning      All the above discussed and understood by pt       Ortho to F/U

## 2020-01-24 NOTE — DISCHARGE NOTE PROVIDER - NSDCFUADDAPPT_GEN_ALL_CORE_FT
Follow up with your surgeon in two weeks. Call for appointment.  If you need more pain medication, call your surgeon's office.  We Recommend a follow up appointment with your primary care physician for repeat blood work (CBC and BMP) for post hospital discharge follow-up care.  Call your surgeon if you have increased redness/pain/drainage or fever. Return to ER for shortness of breath/calf tenderness. Follow up with your surgeon in two weeks. Call for appointment.    If you need more pain medication, call your surgeon's office.    We Recommend a follow up appointment with your primary care physician for repeat blood work (CBC and BMP) for post hospital discharge follow-up care.    Call your surgeon if you have increased redness/pain/drainage or fever. Return to ER for shortness of breath/calf tenderness.

## 2020-01-24 NOTE — PATIENT PROFILE ADULT - ABILITY TO HEAR (WITH HEARING AID OR HEARING APPLIANCE IF NORMALLY USED):
bilateral hearing aids;  has them/Mildly to Moderately Impaired: difficulty hearing in some environments or speaker may need to increase volume or speak distinctly

## 2020-01-24 NOTE — DISCHARGE NOTE PROVIDER - CARE PROVIDER_API CALL
Mack Ellis)  Orthopaedic Surgery  [address] [place] [address]  Phone: [phone]  Fax: [phone]  Follow Up Time:

## 2020-01-24 NOTE — CONSULT NOTE ADULT - SUBJECTIVE AND OBJECTIVE BOX
NORMA LEAL is a 74y Female s/p LEFT TOTAL KNEE ARTHROPLASTY WITH MONALISA ROBOTIC ASSISTANCE    w/ h/o Hard of hearing  SPL (spondylolisthesis)  H/O scoliosis  Spinal stenosis  Anxiety  Osteopenia    denies any chest pain shortness of breath palpitation dizziness lightheadedness nausea vomiting fever or chills    S/P cataract extraction and insertion of intraocular lens  S/P hammer toe correction  S/P shoulder surgery  H/O carpal tunnel repair  H/O total knee replacement, right      SH: doesnot smoke or drink at this time    No Known Allergies    aluminum hydroxide/magnesium hydroxide/simethicone Suspension 30 milliLiter(s) Oral four times a day PRN  ceFAZolin   IVPB 2000 milliGRAM(s) IV Intermittent every 8 hours  diphenhydrAMINE 25 milliGRAM(s) Oral at bedtime PRN  magnesium hydroxide Suspension 30 milliLiter(s) Oral daily PRN  morphine  - Injectable 2 milliGRAM(s) IV Push every 4 hours PRN  morphine  - Injectable 4 milliGRAM(s) IV Push once  ondansetron Injectable 4 milliGRAM(s) IV Push every 6 hours PRN  oxyCODONE    IR 10 milliGRAM(s) Oral every 4 hours PRN  pantoprazole    Tablet 40 milliGRAM(s) Oral before breakfast  polyethylene glycol 3350 17 Gram(s) Oral daily  rivaroxaban 10 milliGRAM(s) Oral daily  senna 2 Tablet(s) Oral at bedtime PRN  sodium chloride 0.9% lock flush 3 milliLiter(s) IV Push every 8 hours    T(C): 36.4 (01-24-20 @ 12:31), Max: 36.4 (01-24-20 @ 06:46)  HR: 99 (01-24-20 @ 14:30) (80 - 99)  BP: 112/51 (01-24-20 @ 14:30) (100/75 - 134/81)  RR: 17 (01-24-20 @ 14:30) (13 - 20)  SpO2: 96% (01-24-20 @ 14:30) (95% - 98%)  HEENT unremarkable  neck no JVD or bruit  heart normal S1 S2 RRR no gallops or rubs  chest clear to auscultation  abd sof nontender non distended +bs  ext no calf tenderness    A/P   DVT PX  pain control  bowel regimen   wound care as per ortho  GI PX  antiemetics prn  incentive spirometer

## 2020-01-24 NOTE — DISCHARGE NOTE PROVIDER - NSDCACTIVITY_GEN_ALL_CORE
No heavy lifting/straining/Walking - Outdoors allowed/Showering allowed/Walking - Indoors allowed/Do not drive or operate machinery/Stairs allowed

## 2020-01-24 NOTE — OCCUPATIONAL THERAPY INITIAL EVALUATION ADULT - ADDITIONAL COMMENTS
Pre op assessment- Pt lives with spouse Steve (whom can provide assist post-op, + recent spinal surgery), 6 entry steps (+ R rail for ascent), 6 steps (+ R rail), landing, additional 6 steps (+ R rail), landing, then additional 6 steps (+ R rail) landing to basement. Pt will avoid basement post-op. Pt is independent with all functional mobility including community ambulation. Pt uses rolling walker on main floor, straight cane in community, & uses electric scooter when traveling. Pt performs all ADL's independently. Pt has a walk in shower stall, + grab bars, retractable shower head, & standard toilet seat height. Pt states 3:1 commode can fit over toilet.

## 2020-01-25 LAB
ANION GAP SERPL CALC-SCNC: 8 MMOL/L — SIGNIFICANT CHANGE UP (ref 5–17)
BUN SERPL-MCNC: 28 MG/DL — HIGH (ref 7–23)
CALCIUM SERPL-MCNC: 8.4 MG/DL — LOW (ref 8.5–10.1)
CHLORIDE SERPL-SCNC: 110 MMOL/L — HIGH (ref 96–108)
CO2 SERPL-SCNC: 21 MMOL/L — LOW (ref 22–31)
CREAT SERPL-MCNC: 0.96 MG/DL — SIGNIFICANT CHANGE UP (ref 0.5–1.3)
GLUCOSE SERPL-MCNC: 88 MG/DL — SIGNIFICANT CHANGE UP (ref 70–99)
HCT VFR BLD CALC: 28.7 % — LOW (ref 34.5–45)
HGB BLD-MCNC: 9.2 G/DL — LOW (ref 11.5–15.5)
MCHC RBC-ENTMCNC: 29.2 PG — SIGNIFICANT CHANGE UP (ref 27–34)
MCHC RBC-ENTMCNC: 32.1 GM/DL — SIGNIFICANT CHANGE UP (ref 32–36)
MCV RBC AUTO: 91.1 FL — SIGNIFICANT CHANGE UP (ref 80–100)
NRBC # BLD: 0 /100 WBCS — SIGNIFICANT CHANGE UP (ref 0–0)
PLATELET # BLD AUTO: 146 K/UL — LOW (ref 150–400)
POTASSIUM SERPL-MCNC: 4.1 MMOL/L — SIGNIFICANT CHANGE UP (ref 3.5–5.3)
POTASSIUM SERPL-SCNC: 4.1 MMOL/L — SIGNIFICANT CHANGE UP (ref 3.5–5.3)
RBC # BLD: 3.15 M/UL — LOW (ref 3.8–5.2)
RBC # FLD: 14.8 % — HIGH (ref 10.3–14.5)
SODIUM SERPL-SCNC: 139 MMOL/L — SIGNIFICANT CHANGE UP (ref 135–145)
WBC # BLD: 10.45 K/UL — SIGNIFICANT CHANGE UP (ref 3.8–10.5)
WBC # FLD AUTO: 10.45 K/UL — SIGNIFICANT CHANGE UP (ref 3.8–10.5)

## 2020-01-25 RX ORDER — RIVAROXABAN 15 MG-20MG
1 KIT ORAL
Qty: 12 | Refills: 0
Start: 2020-01-25 | End: 2020-02-05

## 2020-01-25 RX ORDER — SENNA PLUS 8.6 MG/1
2 TABLET ORAL
Qty: 0 | Refills: 0 | DISCHARGE
Start: 2020-01-25

## 2020-01-25 RX ORDER — OXYCODONE HYDROCHLORIDE 5 MG/1
1 TABLET ORAL
Qty: 80 | Refills: 0
Start: 2020-01-25 | End: 2020-01-31

## 2020-01-25 RX ORDER — KETOROLAC TROMETHAMINE 30 MG/ML
30 SYRINGE (ML) INJECTION ONCE
Refills: 0 | Status: DISCONTINUED | OUTPATIENT
Start: 2020-01-25 | End: 2020-01-25

## 2020-01-25 RX ORDER — IBUPROFEN 200 MG
1 TABLET ORAL
Qty: 0 | Refills: 0 | DISCHARGE

## 2020-01-25 RX ADMIN — OXYCODONE HYDROCHLORIDE 10 MILLIGRAM(S): 5 TABLET ORAL at 17:00

## 2020-01-25 RX ADMIN — OXYCODONE HYDROCHLORIDE 10 MILLIGRAM(S): 5 TABLET ORAL at 03:16

## 2020-01-25 RX ADMIN — OXYCODONE HYDROCHLORIDE 10 MILLIGRAM(S): 5 TABLET ORAL at 04:15

## 2020-01-25 RX ADMIN — Medication 30 MILLIGRAM(S): at 08:10

## 2020-01-25 RX ADMIN — Medication 30 MILLIGRAM(S): at 08:25

## 2020-01-25 RX ADMIN — OXYCODONE HYDROCHLORIDE 10 MILLIGRAM(S): 5 TABLET ORAL at 14:00

## 2020-01-25 RX ADMIN — SODIUM CHLORIDE 3 MILLILITER(S): 9 INJECTION INTRAMUSCULAR; INTRAVENOUS; SUBCUTANEOUS at 15:03

## 2020-01-25 RX ADMIN — RIVAROXABAN 10 MILLIGRAM(S): KIT at 12:56

## 2020-01-25 RX ADMIN — PANTOPRAZOLE SODIUM 40 MILLIGRAM(S): 20 TABLET, DELAYED RELEASE ORAL at 05:40

## 2020-01-25 RX ADMIN — OXYCODONE HYDROCHLORIDE 10 MILLIGRAM(S): 5 TABLET ORAL at 13:00

## 2020-01-25 RX ADMIN — SODIUM CHLORIDE 3 MILLILITER(S): 9 INJECTION INTRAMUSCULAR; INTRAVENOUS; SUBCUTANEOUS at 23:10

## 2020-01-25 RX ADMIN — MORPHINE SULFATE 2 MILLIGRAM(S): 50 CAPSULE, EXTENDED RELEASE ORAL at 21:10

## 2020-01-25 RX ADMIN — SODIUM CHLORIDE 3 MILLILITER(S): 9 INJECTION INTRAMUSCULAR; INTRAVENOUS; SUBCUTANEOUS at 05:40

## 2020-01-25 RX ADMIN — OXYCODONE HYDROCHLORIDE 10 MILLIGRAM(S): 5 TABLET ORAL at 18:00

## 2020-01-25 RX ADMIN — SERTRALINE 150 MILLIGRAM(S): 25 TABLET, FILM COATED ORAL at 12:56

## 2020-01-25 RX ADMIN — MORPHINE SULFATE 2 MILLIGRAM(S): 50 CAPSULE, EXTENDED RELEASE ORAL at 20:51

## 2020-01-25 RX ADMIN — SODIUM CHLORIDE 100 MILLILITER(S): 9 INJECTION, SOLUTION INTRAVENOUS at 03:44

## 2020-01-25 NOTE — PROGRESS NOTE ADULT - SUBJECTIVE AND OBJECTIVE BOX
NORMA LEAL is a 74y Female s/p LEFT TOTAL KNEE ARTHROPLASTY WITH MONALISA ROBOTIC ASSISTANCE      denies any chest pain shortness of breath palpitation dizziness lightheadedness nausea vomiting fever or chills    T(C): 37 (01-25-20 @ 17:02), Max: 37.1 (01-25-20 @ 11:45)  HR: 88 (01-25-20 @ 17:02) (73 - 95)  BP: 116/68 (01-25-20 @ 17:02) (97/41 - 120/64)  RR: 18 (01-25-20 @ 17:02) (16 - 18)  SpO2: 98% (01-25-20 @ 17:02) (95% - 98%)  no jvd/bruit  s1 s2 rrr  cta  s/nt/nd  no calf tend                          9.2    10.45 )-----------( 146      ( 25 Jan 2020 06:41 )             28.7   01-25    139  |  110<H>  |  28<H>  ----------------------------<  88  4.1   |  21<L>  |  0.96    Ca    8.4<L>      25 Jan 2020 06:41      cont dvt px  pain control  bowel regimen  antiemetics  incentive spirometer

## 2020-01-25 NOTE — PROGRESS NOTE ADULT - SUBJECTIVE AND OBJECTIVE BOX
74yFemale s/p L TKA POD#1. Pt seen and examined in NAD. Pt c/o pain, much worse since yesturday. Pt denies any new complaints. Pt denies CP/SOB/N/V/D/numbness/tingling/bowel or bladder dysfunction.     PE:   LLE: (+)hemovac drain, dressing c/d/i. +ROM ankle/toes. Calf: soft, compressible and nontender. DP/PT 2+ NVI.                        9.2    10.45 )-----------( 146      ( 25 Jan 2020 06:41 )             28.7       01-25    139  |  110<H>  |  28<H>  ----------------------------<  88  4.1   |  21<L>  |  0.96    Ca    8.4<L>      25 Jan 2020 06:41      A/P: 74yFemale s/p L TKA POD#1  hemovac drain removed.   Pain control prn with oxycodone and added toradol  PT: WBAT   DVT ppx: SCDs and Xarelto   Wound care, Isometric exercises, incentive spirometry   Discharge: planning home once pain controlled  All the above discussed and understood by pt 74yFemale s/p L TKA POD#1. Pt seen and examined in NAD. Pt c/o pain, much worse since yesturday. Pt denies any new complaints. Pt denies CP/SOB/N/V/D/numbness/tingling/bowel or bladder dysfunction.     PE:   LLE: (+)hemovac drain, dressing c/d/i. +ROM ankle/toes. Calf: soft, compressible and nontender. DP/PT 2+ NVI.                        9.2    10.45 )-----------( 146      ( 25 Jan 2020 06:41 )             28.7       01-25    139  |  110<H>  |  28<H>  ----------------------------<  88  4.1   |  21<L>  |  0.96    Ca    8.4<L>      25 Jan 2020 06:41      A/P: 74yFemale s/p L TKA POD#1  hemovac drain removed.   Pain control prn with oxycodone and added toradol  PT: WBAT   DVT ppx: SCDs and Xarelto   Wound care, Isometric exercises, incentive spirometry   Discharge: planning home Sunday  All the above discussed and understood by pt

## 2020-01-26 ENCOUNTER — TRANSCRIPTION ENCOUNTER (OUTPATIENT)
Age: 75
End: 2020-01-26

## 2020-01-26 VITALS
OXYGEN SATURATION: 96 % | TEMPERATURE: 98 F | HEART RATE: 86 BPM | SYSTOLIC BLOOD PRESSURE: 100 MMHG | RESPIRATION RATE: 16 BRPM | DIASTOLIC BLOOD PRESSURE: 54 MMHG

## 2020-01-26 LAB
ANION GAP SERPL CALC-SCNC: 11 MMOL/L — SIGNIFICANT CHANGE UP (ref 5–17)
BUN SERPL-MCNC: 27 MG/DL — HIGH (ref 7–23)
CALCIUM SERPL-MCNC: 8.6 MG/DL — SIGNIFICANT CHANGE UP (ref 8.5–10.1)
CHLORIDE SERPL-SCNC: 107 MMOL/L — SIGNIFICANT CHANGE UP (ref 96–108)
CO2 SERPL-SCNC: 21 MMOL/L — LOW (ref 22–31)
CREAT SERPL-MCNC: 1.06 MG/DL — SIGNIFICANT CHANGE UP (ref 0.5–1.3)
GLUCOSE SERPL-MCNC: 119 MG/DL — HIGH (ref 70–99)
HCT VFR BLD CALC: 28.4 % — LOW (ref 34.5–45)
HGB BLD-MCNC: 9.1 G/DL — LOW (ref 11.5–15.5)
MCHC RBC-ENTMCNC: 29.7 PG — SIGNIFICANT CHANGE UP (ref 27–34)
MCHC RBC-ENTMCNC: 32 GM/DL — SIGNIFICANT CHANGE UP (ref 32–36)
MCV RBC AUTO: 92.8 FL — SIGNIFICANT CHANGE UP (ref 80–100)
NRBC # BLD: 0 /100 WBCS — SIGNIFICANT CHANGE UP (ref 0–0)
PLATELET # BLD AUTO: 141 K/UL — LOW (ref 150–400)
POTASSIUM SERPL-MCNC: 3.8 MMOL/L — SIGNIFICANT CHANGE UP (ref 3.5–5.3)
POTASSIUM SERPL-SCNC: 3.8 MMOL/L — SIGNIFICANT CHANGE UP (ref 3.5–5.3)
RBC # BLD: 3.06 M/UL — LOW (ref 3.8–5.2)
RBC # FLD: 15.2 % — HIGH (ref 10.3–14.5)
SODIUM SERPL-SCNC: 139 MMOL/L — SIGNIFICANT CHANGE UP (ref 135–145)
WBC # BLD: 8.13 K/UL — SIGNIFICANT CHANGE UP (ref 3.8–10.5)
WBC # FLD AUTO: 8.13 K/UL — SIGNIFICANT CHANGE UP (ref 3.8–10.5)

## 2020-01-26 RX ORDER — SODIUM CHLORIDE 9 MG/ML
1000 INJECTION INTRAMUSCULAR; INTRAVENOUS; SUBCUTANEOUS
Refills: 0 | Status: DISCONTINUED | OUTPATIENT
Start: 2020-01-26 | End: 2020-01-26

## 2020-01-26 RX ORDER — SODIUM CHLORIDE 9 MG/ML
1000 INJECTION INTRAMUSCULAR; INTRAVENOUS; SUBCUTANEOUS ONCE
Refills: 0 | Status: COMPLETED | OUTPATIENT
Start: 2020-01-26 | End: 2020-01-26

## 2020-01-26 RX ORDER — CELECOXIB 200 MG/1
200 CAPSULE ORAL DAILY
Refills: 0 | Status: DISCONTINUED | OUTPATIENT
Start: 2020-01-26 | End: 2020-01-26

## 2020-01-26 RX ORDER — CELECOXIB 200 MG/1
1 CAPSULE ORAL
Qty: 30 | Refills: 0
Start: 2020-01-26 | End: 2020-02-24

## 2020-01-26 RX ORDER — KETOROLAC TROMETHAMINE 30 MG/ML
30 SYRINGE (ML) INJECTION ONCE
Refills: 0 | Status: DISCONTINUED | OUTPATIENT
Start: 2020-01-26 | End: 2020-01-26

## 2020-01-26 RX ADMIN — SODIUM CHLORIDE 500 MILLILITER(S): 9 INJECTION INTRAMUSCULAR; INTRAVENOUS; SUBCUTANEOUS at 11:57

## 2020-01-26 RX ADMIN — CELECOXIB 200 MILLIGRAM(S): 200 CAPSULE ORAL at 09:13

## 2020-01-26 RX ADMIN — Medication 30 MILLIGRAM(S): at 08:36

## 2020-01-26 RX ADMIN — SODIUM CHLORIDE 3 MILLILITER(S): 9 INJECTION INTRAMUSCULAR; INTRAVENOUS; SUBCUTANEOUS at 14:51

## 2020-01-26 RX ADMIN — OXYCODONE HYDROCHLORIDE 10 MILLIGRAM(S): 5 TABLET ORAL at 15:20

## 2020-01-26 RX ADMIN — Medication 30 MILLIGRAM(S): at 08:50

## 2020-01-26 RX ADMIN — SODIUM CHLORIDE 100 MILLILITER(S): 9 INJECTION INTRAMUSCULAR; INTRAVENOUS; SUBCUTANEOUS at 14:24

## 2020-01-26 RX ADMIN — OXYCODONE HYDROCHLORIDE 10 MILLIGRAM(S): 5 TABLET ORAL at 06:40

## 2020-01-26 RX ADMIN — SODIUM CHLORIDE 3 MILLILITER(S): 9 INJECTION INTRAMUSCULAR; INTRAVENOUS; SUBCUTANEOUS at 06:20

## 2020-01-26 RX ADMIN — SERTRALINE 150 MILLIGRAM(S): 25 TABLET, FILM COATED ORAL at 11:13

## 2020-01-26 RX ADMIN — OXYCODONE HYDROCHLORIDE 10 MILLIGRAM(S): 5 TABLET ORAL at 07:22

## 2020-01-26 RX ADMIN — CELECOXIB 200 MILLIGRAM(S): 200 CAPSULE ORAL at 08:37

## 2020-01-26 RX ADMIN — OXYCODONE HYDROCHLORIDE 10 MILLIGRAM(S): 5 TABLET ORAL at 03:30

## 2020-01-26 RX ADMIN — OXYCODONE HYDROCHLORIDE 10 MILLIGRAM(S): 5 TABLET ORAL at 14:21

## 2020-01-26 RX ADMIN — RIVAROXABAN 10 MILLIGRAM(S): KIT at 11:13

## 2020-01-26 RX ADMIN — OXYCODONE HYDROCHLORIDE 10 MILLIGRAM(S): 5 TABLET ORAL at 02:36

## 2020-01-26 RX ADMIN — PANTOPRAZOLE SODIUM 40 MILLIGRAM(S): 20 TABLET, DELAYED RELEASE ORAL at 06:20

## 2020-01-26 NOTE — DISCHARGE NOTE NURSING/CASE MANAGEMENT/SOCIAL WORK - PATIENT PORTAL LINK FT
You can access the FollowMyHealth Patient Portal offered by [facility] by registering at the following website: [email or web address]. By joining [facility]’s FollowMyHealth portal, you will also be able to view your health information using other applications (apps) compatible with our system.

## 2020-01-26 NOTE — PROGRESS NOTE ADULT - SUBJECTIVE AND OBJECTIVE BOX
Patient is seen and examined at bedside. Denies CP/SOB/Dizziness/N/V/D/HA. Pain is controlled.     Vital Signs Last 24 Hrs  T(C): 36.8 (26 Jan 2020 05:00), Max: 37.7 (26 Jan 2020 01:00)  T(F): 98.2 (26 Jan 2020 05:00), Max: 99.9 (26 Jan 2020 01:00)  HR: 96 (26 Jan 2020 05:00) (86 - 98)  BP: 115/70 (26 Jan 2020 05:00) (97/41 - 120/64)  BP(mean): --  RR: 16 (26 Jan 2020 05:00) (16 - 18)  SpO2: 96% (26 Jan 2020 05:00) (95% - 98%)      PHYSICAL EXAM:  General: NAD, WDWN.   Neuro:  Alert & responsive  HEENT: NCAT, EOMI, conjunctiva clear  abd: soft, NT/ND  Right LE: Motor intact + EHL/FHL/TA/GS.  Sensation is grossly intact.  Extremity warm, compartments soft, compressible. No calf tenderness. DP 2+   Left LE: JEET dressing with scant spots of serosanguinous drainage. Motor intact +EHL/FHL/TA/GS. Sensation is grossly intact. Extremity warm, compartments soft, compressible. No calf tenderness. DP2+    Labs:                          9.1    8.13  )-----------( 141      ( 26 Jan 2020 07:53 )             28.4       01-25    139  |  110<H>  |  28<H>  ----------------------------<  88  4.1   |  21<L>  |  0.96    Ca    8.4<L>      25 Jan 2020 06:41        A/P: Patient is a 74y y/o Female s/p left total knee arthroplasty, POD # 2  -wound care, knee extension/leg elevation, cryocuff, isometric exercises, new medications reviewed with pt  -Pain control/analgesia: toradol X 1 dose. Celebrex added.   -Inc spirometry reviewed with pt, demonstrated competence  -DVT prophylaxis with Venodynes/Xarelto  -F/U SMA  -PT/OT/WBAT  -medical consult appreciated  -DC planning: home today

## 2020-01-26 NOTE — PROGRESS NOTE ADULT - SUBJECTIVE AND OBJECTIVE BOX
NORMA LEAL is a 74y Female s/p LEFT TOTAL KNEE ARTHROPLASTY WITH MONALISA ROBOTIC ASSISTANCE      denies any chest pain shortness of breath palpitation dizziness lightheadedness nausea vomiting fever or chills    T(C): 36.8 (01-26-20 @ 05:00), Max: 37.7 (01-26-20 @ 01:00)  HR: 96 (01-26-20 @ 05:00) (86 - 98)  BP: 115/70 (01-26-20 @ 05:00) (97/41 - 120/64)  RR: 16 (01-26-20 @ 05:00) (16 - 18)  SpO2: 96% (01-26-20 @ 05:00) (95% - 98%)  no jvd/bruit  s1 s2 rrr  cta  s/nt/nd  no calf tend                          9.1    8.13  )-----------( 141      ( 26 Jan 2020 07:53 )             28.4   01-25    139  |  110<H>  |  28<H>  ----------------------------<  88  4.1   |  21<L>  |  0.96    Ca    8.4<L>      25 Jan 2020 06:41      cont dvt px  pain control  bowel regimen  antiemetics  incentive spirometer

## 2020-01-26 NOTE — DISCHARGE NOTE NURSING/CASE MANAGEMENT/SOCIAL WORK - NSDCFUADDAPPT_GEN_ALL_CORE_FT
Follow up with your surgeon in two weeks. Call for appointment.    If you need more pain medication, call your surgeon's office.    We Recommend a follow up appointment with your primary care physician for repeat blood work (CBC and BMP) for post hospital discharge follow-up care.    Call your surgeon if you have increased redness/pain/drainage or fever. Return to ER for shortness of breath/calf tenderness.

## 2020-01-28 LAB — SURGICAL PATHOLOGY STUDY: SIGNIFICANT CHANGE UP

## 2020-01-29 ENCOUNTER — TRANSCRIPTION ENCOUNTER (OUTPATIENT)
Age: 75
End: 2020-01-29

## 2020-01-30 ENCOUNTER — INPATIENT (INPATIENT)
Facility: HOSPITAL | Age: 75
LOS: 11 days | Discharge: HOME | DRG: 853 | End: 2020-02-11
Attending: SURGERY | Admitting: SURGERY
Payer: MEDICARE

## 2020-01-30 VITALS
WEIGHT: 130.07 LBS | SYSTOLIC BLOOD PRESSURE: 111 MMHG | TEMPERATURE: 98 F | HEART RATE: 108 BPM | HEIGHT: 63 IN | OXYGEN SATURATION: 100 % | RESPIRATION RATE: 20 BRPM | DIASTOLIC BLOOD PRESSURE: 73 MMHG

## 2020-01-30 DIAGNOSIS — Z96.651 PRESENCE OF RIGHT ARTIFICIAL KNEE JOINT: Chronic | ICD-10-CM

## 2020-01-30 DIAGNOSIS — Z98.890 OTHER SPECIFIED POSTPROCEDURAL STATES: Chronic | ICD-10-CM

## 2020-01-30 DIAGNOSIS — Z98.49 CATARACT EXTRACTION STATUS, UNSPECIFIED EYE: Chronic | ICD-10-CM

## 2020-01-30 DIAGNOSIS — Z96.652 PRESENCE OF LEFT ARTIFICIAL KNEE JOINT: Chronic | ICD-10-CM

## 2020-01-30 DIAGNOSIS — K57.80 DIVERTICULITIS OF INTESTINE, PART UNSPECIFIED, WITH PERFORATION AND ABSCESS WITHOUT BLEEDING: ICD-10-CM

## 2020-01-30 LAB
ALBUMIN SERPL ELPH-MCNC: 3 G/DL — LOW (ref 3.3–5)
ALP SERPL-CCNC: 129 U/L — HIGH (ref 40–120)
ALT FLD-CCNC: 24 U/L — SIGNIFICANT CHANGE UP (ref 10–45)
ANION GAP SERPL CALC-SCNC: 17 MMOL/L — SIGNIFICANT CHANGE UP (ref 5–17)
APTT BLD: 37.9 SEC — HIGH (ref 27.5–36.3)
AST SERPL-CCNC: 28 U/L — SIGNIFICANT CHANGE UP (ref 10–40)
BASOPHILS # BLD AUTO: 0 K/UL — SIGNIFICANT CHANGE UP (ref 0–0.2)
BASOPHILS NFR BLD AUTO: 0 % — SIGNIFICANT CHANGE UP (ref 0–2)
BILIRUB SERPL-MCNC: 1.3 MG/DL — HIGH (ref 0.2–1.2)
BLD GP AB SCN SERPL QL: NEGATIVE — SIGNIFICANT CHANGE UP
BUN SERPL-MCNC: 42 MG/DL — HIGH (ref 7–23)
CALCIUM SERPL-MCNC: 10 MG/DL — SIGNIFICANT CHANGE UP (ref 8.4–10.5)
CHLORIDE SERPL-SCNC: 98 MMOL/L — SIGNIFICANT CHANGE UP (ref 96–108)
CO2 SERPL-SCNC: 17 MMOL/L — LOW (ref 22–31)
CREAT SERPL-MCNC: 2.22 MG/DL — HIGH (ref 0.5–1.3)
EOSINOPHIL # BLD AUTO: 0 K/UL — SIGNIFICANT CHANGE UP (ref 0–0.5)
EOSINOPHIL NFR BLD AUTO: 0 % — SIGNIFICANT CHANGE UP (ref 0–6)
GAS PNL BLDA: SIGNIFICANT CHANGE UP
GLUCOSE SERPL-MCNC: 102 MG/DL — HIGH (ref 70–99)
HCT VFR BLD CALC: 26.3 % — LOW (ref 34.5–45)
HGB BLD-MCNC: 8.4 G/DL — LOW (ref 11.5–15.5)
INR BLD: 2.1 RATIO — HIGH (ref 0.88–1.16)
LACTATE BLDV-MCNC: 2.1 MMOL/L — HIGH (ref 0.7–2)
LYMPHOCYTES # BLD AUTO: 0.44 K/UL — LOW (ref 1–3.3)
LYMPHOCYTES # BLD AUTO: 4 % — LOW (ref 13–44)
MCHC RBC-ENTMCNC: 29.5 PG — SIGNIFICANT CHANGE UP (ref 27–34)
MCHC RBC-ENTMCNC: 31.9 GM/DL — LOW (ref 32–36)
MCV RBC AUTO: 92.3 FL — SIGNIFICANT CHANGE UP (ref 80–100)
MONOCYTES # BLD AUTO: 0.56 K/UL — SIGNIFICANT CHANGE UP (ref 0–0.9)
MONOCYTES NFR BLD AUTO: 5 % — SIGNIFICANT CHANGE UP (ref 2–14)
NEUTROPHILS # BLD AUTO: 10.1 K/UL — HIGH (ref 1.8–7.4)
NEUTROPHILS NFR BLD AUTO: 87 % — HIGH (ref 43–77)
PLATELET # BLD AUTO: 239 K/UL — SIGNIFICANT CHANGE UP (ref 150–400)
POTASSIUM SERPL-MCNC: 3.6 MMOL/L — SIGNIFICANT CHANGE UP (ref 3.5–5.3)
POTASSIUM SERPL-SCNC: 3.6 MMOL/L — SIGNIFICANT CHANGE UP (ref 3.5–5.3)
PROT SERPL-MCNC: 6.2 G/DL — SIGNIFICANT CHANGE UP (ref 6–8.3)
PROTHROM AB SERPL-ACNC: 24.7 SEC — HIGH (ref 10–12.9)
RBC # BLD: 2.85 M/UL — LOW (ref 3.8–5.2)
RBC # FLD: 15.7 % — HIGH (ref 10.3–14.5)
RH IG SCN BLD-IMP: POSITIVE — SIGNIFICANT CHANGE UP
RH IG SCN BLD-IMP: POSITIVE — SIGNIFICANT CHANGE UP
SODIUM SERPL-SCNC: 132 MMOL/L — LOW (ref 135–145)
WBC # BLD: 11.1 K/UL — HIGH (ref 3.8–10.5)
WBC # FLD AUTO: 11.1 K/UL — HIGH (ref 3.8–10.5)

## 2020-01-30 PROCEDURE — 44143 PARTIAL REMOVAL OF COLON: CPT | Mod: GC

## 2020-01-30 PROCEDURE — 99223 1ST HOSP IP/OBS HIGH 75: CPT | Mod: 57

## 2020-01-30 PROCEDURE — 58940 REMOVAL OF OVARY(S): CPT | Mod: GC

## 2020-01-30 PROCEDURE — 99291 CRITICAL CARE FIRST HOUR: CPT | Mod: GC

## 2020-01-30 PROCEDURE — 93010 ELECTROCARDIOGRAM REPORT: CPT

## 2020-01-30 PROCEDURE — 97605 NEG PRS WND THER DME<=50SQCM: CPT | Mod: GC

## 2020-01-30 RX ORDER — SODIUM CHLORIDE 9 MG/ML
1000 INJECTION, SOLUTION INTRAVENOUS
Refills: 0 | Status: DISCONTINUED | OUTPATIENT
Start: 2020-01-30 | End: 2020-01-30

## 2020-01-30 RX ORDER — POTASSIUM CHLORIDE 20 MEQ
10 PACKET (EA) ORAL ONCE
Refills: 0 | Status: DISCONTINUED | OUTPATIENT
Start: 2020-01-30 | End: 2020-01-30

## 2020-01-30 RX ORDER — SODIUM CHLORIDE 9 MG/ML
1000 INJECTION INTRAMUSCULAR; INTRAVENOUS; SUBCUTANEOUS ONCE
Refills: 0 | Status: COMPLETED | OUTPATIENT
Start: 2020-01-30 | End: 2020-01-30

## 2020-01-30 RX ORDER — PIPERACILLIN AND TAZOBACTAM 4; .5 G/20ML; G/20ML
3.38 INJECTION, POWDER, LYOPHILIZED, FOR SOLUTION INTRAVENOUS EVERY 8 HOURS
Refills: 0 | Status: DISCONTINUED | OUTPATIENT
Start: 2020-01-30 | End: 2020-01-30

## 2020-01-30 RX ORDER — HEPARIN SODIUM 5000 [USP'U]/ML
5000 INJECTION INTRAVENOUS; SUBCUTANEOUS EVERY 8 HOURS
Refills: 0 | Status: DISCONTINUED | OUTPATIENT
Start: 2020-01-30 | End: 2020-01-30

## 2020-01-30 RX ORDER — PIPERACILLIN AND TAZOBACTAM 4; .5 G/20ML; G/20ML
3.38 INJECTION, POWDER, LYOPHILIZED, FOR SOLUTION INTRAVENOUS ONCE
Refills: 0 | Status: COMPLETED | OUTPATIENT
Start: 2020-01-30 | End: 2020-01-30

## 2020-01-30 RX ADMIN — SODIUM CHLORIDE 1000 MILLILITER(S): 9 INJECTION INTRAMUSCULAR; INTRAVENOUS; SUBCUTANEOUS at 17:11

## 2020-01-30 RX ADMIN — PIPERACILLIN AND TAZOBACTAM 200 GRAM(S): 4; .5 INJECTION, POWDER, LYOPHILIZED, FOR SOLUTION INTRAVENOUS at 17:11

## 2020-01-30 NOTE — ED PROVIDER NOTE - SEVERE SEPSIS ALERT DETAILS
Dr. Morgan Strange, PGY-2:  I have seen and evaluated this patient and do not believe the patient is septic at this time.  I will continue to evaluate.

## 2020-01-30 NOTE — H&P ADULT - ATTENDING COMMENTS
seen and examined 01-30-20 @ [facility]    1/24/2020 @ [facility] - left TKA  2005 [facility] - right TKA    2015 by [facility] - colonoscopy demonstrating diverticulosis    she never had diverticulitis in the past  she takes Xarelto for VTE prophylaxis    pain from constipation for 4 days after surgery, constipation resolved with Miralax, but pain persisted and worsened.    97.8  113/59  92  17  SpO2 = 98% on room air  anxious and mildly short of breath  diffuse abdomen tenderness with rebound and involuntary guarding  no surgical scars on abdomen    WBC = 11  INR = 2.1    Na = 132  HCO3 = 17  Cr = 2.22 (baseline = 1.0)  lactate = 2.1    CT abd/pelvis w/o contrast @ [facility] - diverticulosis w/o diverticulitis, small amount of free air in the upper abdomen, air tracking up mesentery (likely from sigmoid colon), free fluid in pelvis      sepsis secondary to perforated diverticulitis  -Given acute renal failure, concerning abdominal exam and recent left TKA, Luisana's procedure is indicated. The risks (bleeding, infection, bowel injury, ureteral injury), benefits and alternatives of sigmoid colectomy with end colostomy were discussed with the patient and her family at bedside in the ER. Written informed consent was obtained for emergent surgery. I explained that I could not definitely confirm the diagnosis until laparotomy, so if other pathology is found, the surgical plan could change.

## 2020-01-30 NOTE — H&P ADULT - NSHPPHYSICALEXAM_GEN_ALL_CORE
Physical Exam  T(C): 36.6  HR: 92 (90 - 108)  BP: 113/59 (107/79 - 117/55)  RR: 17 (16 - 20)  SpO2: 98% (98% - 100%)  Tmax: T(C): , Max: 36.6 (01-30-20 @ 15:29)    General: well developed, well nourished, NAD  Neuro: alert and oriented x4y  Respiratory: nonlabored on RA  CVS: regular rate and rhythm  Abdomen: soft, diffuse tenderness to palpation with rebound, worst in LLQ, distended  Extremities: no edema, sensation and movement grossly intact  Skin: warm, dry, appropriate color

## 2020-01-30 NOTE — H&P ADULT - NSHPLABSRESULTS_GEN_ALL_CORE
Labs:                        8.4    11.10 )-----------( 239      ( 30 Jan 2020 16:32 )             26.3     PT/INR - ( 30 Jan 2020 16:32 )   PT: 24.7 sec;   INR: 2.10 ratio         PTT - ( 30 Jan 2020 16:32 )  PTT:37.9 sec  01-30    132<L>  |  98  |  42<H>  ----------------------------<  102<H>  3.6   |  17<L>  |  2.22<H>    Ca    10.0      30 Jan 2020 16:32    TPro  6.2  /  Alb  3.0<L>  /  TBili  1.3<H>  /  DBili  x   /  AST  28  /  ALT  24  /  AlkPhos  129<H>  01-30    Imaging and other studies:  CT read at outside facility

## 2020-01-30 NOTE — ED CLERICAL - NS ED CLERK NOTE PRE-ARRIVAL INFORMATION; ADDITIONAL PRE-ARRIVAL INFORMATION
CC/Reason For referral:  RECENT KNEE REPLACEMENT SX.  DIFFUSE LOWER ABD PAIN/UMBILICAL PAIN. WBC 13.2, ALSO ANEMIC. CT SCAN SHOWS FREE AIR - POSSIBLE ABSCESS - QUESTIONABLE SOURCE R/O DIVERTICULITIS WITH PERFORATION   Preferred Consultant(if applicable):  Who admits for you (if needed):  DR CAN  Do you have documents you would like to fax over?   Would you still like to speak to an ED attending? PLEASE CALL AFTER PATIENT IS SEEN

## 2020-01-30 NOTE — ED ADULT NURSE NOTE - INTERVENTIONS DEFINITIONS
Monitor gait and stability/Provide visual cue, wrist band, yellow gown, etc./Monitor for mental status changes and reorient to person, place, and time/Non-slip footwear when patient is off stretcher/Physically safe environment: no spills, clutter or unnecessary equipment/[place] to call system/Stretcher in lowest position, wheels locked, appropriate side rails in place

## 2020-01-30 NOTE — ED ADULT NURSE REASSESSMENT NOTE - NS ED NURSE REASSESS COMMENT FT1
Patient instructed to not ambulate. PCA Randy brought wheelchair to patient and instructed to bring patient to bathroom. Daughter accompanied patient as well. Patient instructed to bring a urine sample to the bedside.

## 2020-01-30 NOTE — H&P ADULT - HISTORY OF PRESENT ILLNESS
GENERAL SURGERY H&P    HPI:  74F with no PMHx, PSHx of R TKR 15 years ago and left TKR this past Friday at [facility] presenting with 4 days of abdominal pain and distension. After her LTKR last week, she was discharged home on Sunday with [id number] daily for 12 days DVT ppx and oxycodone for pain control. On Monday morning she developed abdominal pain and distension. She describes the pain as a severe ache, worst in the LLQ. She thought it was constipation in the setting of taking narcotic pain medication for her knee replacement. On Monday, she had not passed a bowel movement since prior to the surgery so she started taking Miralax on Tuesday. She then passed a bowel movement on Tuesday after the Miralax but did not have much improvement with the pain. Over the next two days, the pain persisted and the distension worsened. Last bowel movement was this morning, somewhat small in size. She denies any fevers or chills, denies nausea or vomiting but has had a poor appetite and has had limited PO intake. She has been able to tolerate home PT and has been carrying out her normal ADLs despite the abdominal pain. Today she presented to her PMD this morning for the abdominal pain. The PMD noted a low WBC and anemia and recommend iron supplementation and for her to have a CT of the abdomen and pelvis. She had the CTAP at a [facility] and got a call from her PMD shortly thereafter while she was eating lunch advising her to present to the ED for concerns of "free air" in her abdomen.   In the ED, she was initially tachy to 108 but has since downtrended to 90s, her BP has been within normal limits, and she has received 1L NS and a dose of Zosyn. Her lab work is significant for WBC 11, anemia, a bicarb of 17 and lactate of 2.1 INR 2.1 and an HARJIT to 2.22, also elevated bilirubin to 1.3 and alkphos of 129.    PMHx: None  PSHx: LTKR 2005, RTKR 1/24/20  Medications (inpatient): heparin  Injectable 5000 Unit(s) SubCutaneous every 8 hours  lactated ringers. 1000 milliLiter(s) IV Continuous <Continuous>  piperacillin/tazobactam IVPB.. 3.375 Gram(s) IV Intermittent every 8 hours  potassium chloride  10 mEq/100 mL IVPB 10 milliEquivalent(s) IV Intermittent once    Allergies: No Known Allergies  Social Hx: Lives at home with her , nonsmoker, completes all ADLs  Family Hx: No significant past medical history

## 2020-01-30 NOTE — ED PROVIDER NOTE - OBJECTIVE STATEMENT
74y F presents to ED w/ concern for free air under diaphragm and intra abdominal abscess seen on CT A/P done today by request of PMD, Dr. Francisco Reyes, 2/2 to pt having increased abdominal distention w/ pain. Pt has been constipated over the past week. Currently on Oxycodone and Celebrex for pain S/P L knee replacement sx on 1/24. Endorses chills, mild nausea, SOB, but denies fever. Saw PMD today to F/U BW results with leukocytosis, told to come to ER for further evaluation. 74y F presents to ED w/ concern for free air under diaphragm and intra abdominal abscess seen on CT A/P done today by request of PMD, Dr. Zachary Whipple, 2/2 to pt having increased abdominal distention w/ pain. Pt has been constipated over the past week. Currently on Oxycodone and Celebrex for pain S/P L knee replacement sx on 1/24. Endorses chills, mild nausea, SOB, but denies fever. Saw PMD today to F/U BW results with leukocytosis, told to come to ER for further evaluation. 74y F , no pmh, presents to ED w/ concern for free air under diaphragm and intra abdominal abscess seen on CT A/P done today by request of PMD, Dr. Zachary Whipple, 2/2 to pt having increased abdominal distention w/ pain. Pt has been constipated over the past week. Abdominal pain started 3 days ago. Pt recently had left knee replacement 1/24. Currently on Oxycodone and Celebrex for pain. Endorses chills, mild nausea, SOB, but denies fever. Saw PMD today to F/U BW results with leukocytosis, told to come to ER for further evaluation.

## 2020-01-30 NOTE — ED ADULT NURSE NOTE - OBJECTIVE STATEMENT
73 y/o female arrives to ED from home sent for concern of free fluid under diaphragm and intra abdominal abscess seen on CT outpatient today. Patient sent for CT by MD Whipple due to increased abdominal distention and pain over last week. Patient had left knee replacement 1/24, surgical site/clean and dry. No surrounding redness or discharge. Arrives a/ox4, respirations unlabored, reports mild shortness of breath at times. Generalized abdominal discomfort 5/10. No fever/chills, sick contacts. Mild nausea /v/d. IV 20g placed to right AC, labs drawn. Family at beside. Patient well appearing.

## 2020-01-30 NOTE — ED PROVIDER NOTE - PROGRESS NOTE DETAILS
phone call to Dr. Whipple, [facility] radiologist, felt weak to follow up for knee sx, constipation and things improved s/p bowel movement and 13k WBC, LLQ pain, had a CT scan w/ signs of peritoneal free air ? 7CM abscess ?diverticulitis still being reviewed, diverticular abscess w/ free air, w/ airfluid level, and diffuse inflammation, anemia9.1, fe level was 9 phone call to sx for consult, will give IV hydration, and zosyn Dr. Morgan Strange, PGY-2: received sign out on this pt pending surgery consult. Surgery attending at bedside now evaluating pt Dr. Morgan Strange, PGY-2: pt accepted to surgery under Dr. ARGENTINA Pierre phone call to Dr. Donald,  states he spoke w/ [facility] radiologist who reported free air w/ abscess, pt reported in office to Dr. donald was weak s/p knee sx, constipation and things improved s/p bowel movement and 13k WBC, LLQ pain, had a CT scan w/ signs of peritoneal free air ? 7CM abscess ?diverticulitis still being reviewed, diverticular abscess w/ free air, w/ airfluid level, and diffuse inflammation, anemia9.1, fe level was 9

## 2020-01-30 NOTE — H&P ADULT - ASSESSMENT
ASSESSMENT  74F with no PMHx and PSHx of R TKR 15 years ago and left TKR this past Friday at [facility] presenting with 4 days of abdominal pain and distension in the setting of constipation and a history of diverticulosis. Found to have significant pneumoperitoneum on CTAP concerning for perforated bowel, although the source of the perforation remains unclear, it is likely from diverticuli    PLAN  - Admit to [facility] under Dr. Pierre  - NPO IVF  - Booked and consented for exploratory laparotomy poss bowel resection poss ostomy  - Will hold off on reversal of INR 2.1  - Will need physical therapy post op  - Discussed with Dr. Pierre    Acute Care Surgery  [id number]

## 2020-01-30 NOTE — ED PROVIDER NOTE - ATTENDING CONTRIBUTION TO CARE
concern for ?intraabdominal free air,   no report w/ patient  cd brought in  presented to medical records to upload  pt nontoxic but w/ tenderness in the LLQ +guarding  concern for peritonitis  sx called given zosyn and IVF  plan for admisison

## 2020-01-30 NOTE — ED PROVIDER NOTE - PHYSICAL EXAMINATION
General: NAD, good hygiene, well developed  HENT: Atraumatic, EOMI, no conjunctivae injection, moist mucosa  Neck: normal ROM and trachea midline   Cardiovascular: RRR, S1&2, no M or R, radial pulses equal and b/l  Respiratory: CTABL, no wheezes or crackles, no decreased breath sounds  Abdominal: soft and tender w. guarding, mildly distended, no cosby's sign, rovsing's sign, mcburney's sign, no CVA tenderness   Extremities: no edema of the legs/feet, DP/PT equal b/l  Skin: multiple bruises on b/l arm, warm, well perfused  Neurologic: nonfocal, AAOx3  Psych: normal mood and affect

## 2020-01-30 NOTE — ED ADULT TRIAGE NOTE - CHIEF COMPLAINT QUOTE
Pt had  left knee replacement  5 days  ago  C/O abdominal pain  bloated constipation X 2 days had CT abdomen today  as told by daughter Amaris Newton  CT scan shows Multiple abdomen abscess  & free Air  . Pt also  has SOB  Pt states she had constipated bowel movement today

## 2020-01-31 ENCOUNTER — RESULT REVIEW (OUTPATIENT)
Age: 75
End: 2020-01-31

## 2020-01-31 DIAGNOSIS — M17.12 UNILATERAL PRIMARY OSTEOARTHRITIS, LEFT KNEE: ICD-10-CM

## 2020-01-31 DIAGNOSIS — H91.90 UNSPECIFIED HEARING LOSS, UNSPECIFIED EAR: ICD-10-CM

## 2020-01-31 DIAGNOSIS — M85.80 OTHER SPECIFIED DISORDERS OF BONE DENSITY AND STRUCTURE, UNSPECIFIED SITE: ICD-10-CM

## 2020-01-31 DIAGNOSIS — M48.00 SPINAL STENOSIS, SITE UNSPECIFIED: ICD-10-CM

## 2020-01-31 DIAGNOSIS — F41.9 ANXIETY DISORDER, UNSPECIFIED: ICD-10-CM

## 2020-01-31 PROBLEM — M43.10 SPONDYLOLISTHESIS, SITE UNSPECIFIED: Chronic | Status: ACTIVE | Noted: 2020-01-13

## 2020-01-31 PROBLEM — Z87.39 PERSONAL HISTORY OF OTHER DISEASES OF THE MUSCULOSKELETAL SYSTEM AND CONNECTIVE TISSUE: Chronic | Status: ACTIVE | Noted: 2020-01-13

## 2020-01-31 LAB
ANION GAP SERPL CALC-SCNC: 17 MMOL/L — SIGNIFICANT CHANGE UP (ref 5–17)
APTT BLD: 29.7 SEC — SIGNIFICANT CHANGE UP (ref 27.5–36.3)
BUN SERPL-MCNC: 42 MG/DL — HIGH (ref 7–23)
CALCIUM SERPL-MCNC: 8.5 MG/DL — SIGNIFICANT CHANGE UP (ref 8.4–10.5)
CHLORIDE SERPL-SCNC: 103 MMOL/L — SIGNIFICANT CHANGE UP (ref 96–108)
CO2 SERPL-SCNC: 13 MMOL/L — LOW (ref 22–31)
CREAT SERPL-MCNC: 2.03 MG/DL — HIGH (ref 0.5–1.3)
GAS PNL BLDA: SIGNIFICANT CHANGE UP
GLUCOSE SERPL-MCNC: 115 MG/DL — HIGH (ref 70–99)
GRAM STN FLD: SIGNIFICANT CHANGE UP
HCT VFR BLD CALC: 36.7 % — SIGNIFICANT CHANGE UP (ref 34.5–45)
HGB BLD-MCNC: 11.5 G/DL — SIGNIFICANT CHANGE UP (ref 11.5–15.5)
INR BLD: 1.44 RATIO — HIGH (ref 0.88–1.16)
MAGNESIUM SERPL-MCNC: 1.8 MG/DL — SIGNIFICANT CHANGE UP (ref 1.6–2.6)
MCHC RBC-ENTMCNC: 28.4 PG — SIGNIFICANT CHANGE UP (ref 27–34)
MCHC RBC-ENTMCNC: 31.3 GM/DL — LOW (ref 32–36)
MCV RBC AUTO: 90.6 FL — SIGNIFICANT CHANGE UP (ref 80–100)
NRBC # BLD: 0 /100 WBCS — SIGNIFICANT CHANGE UP (ref 0–0)
PHOSPHATE SERPL-MCNC: 4.6 MG/DL — HIGH (ref 2.5–4.5)
PLATELET # BLD AUTO: 254 K/UL — SIGNIFICANT CHANGE UP (ref 150–400)
POTASSIUM SERPL-MCNC: 3.5 MMOL/L — SIGNIFICANT CHANGE UP (ref 3.5–5.3)
POTASSIUM SERPL-SCNC: 3.5 MMOL/L — SIGNIFICANT CHANGE UP (ref 3.5–5.3)
PROTHROM AB SERPL-ACNC: 16.7 SEC — HIGH (ref 10–12.9)
RBC # BLD: 4.05 M/UL — SIGNIFICANT CHANGE UP (ref 3.8–5.2)
RBC # FLD: 14.8 % — HIGH (ref 10.3–14.5)
SODIUM SERPL-SCNC: 133 MMOL/L — LOW (ref 135–145)
SPECIMEN SOURCE: SIGNIFICANT CHANGE UP
WBC # BLD: 4.69 K/UL — SIGNIFICANT CHANGE UP (ref 3.8–10.5)
WBC # FLD AUTO: 4.69 K/UL — SIGNIFICANT CHANGE UP (ref 3.8–10.5)

## 2020-01-31 PROCEDURE — 88302 TISSUE EXAM BY PATHOLOGIST: CPT | Mod: 26

## 2020-01-31 PROCEDURE — 88305 TISSUE EXAM BY PATHOLOGIST: CPT | Mod: 26

## 2020-01-31 PROCEDURE — 73560 X-RAY EXAM OF KNEE 1 OR 2: CPT | Mod: 26,50

## 2020-01-31 PROCEDURE — 71045 X-RAY EXAM CHEST 1 VIEW: CPT | Mod: 26

## 2020-01-31 PROCEDURE — 88307 TISSUE EXAM BY PATHOLOGIST: CPT | Mod: 26

## 2020-01-31 PROCEDURE — 93971 EXTREMITY STUDY: CPT | Mod: 26

## 2020-01-31 RX ORDER — FENTANYL CITRATE 50 UG/ML
50 INJECTION INTRAVENOUS
Refills: 0 | Status: DISCONTINUED | OUTPATIENT
Start: 2020-01-31 | End: 2020-01-31

## 2020-01-31 RX ORDER — PANTOPRAZOLE SODIUM 20 MG/1
40 TABLET, DELAYED RELEASE ORAL DAILY
Refills: 0 | Status: DISCONTINUED | OUTPATIENT
Start: 2020-01-31 | End: 2020-02-04

## 2020-01-31 RX ORDER — MAGNESIUM SULFATE 500 MG/ML
2 VIAL (ML) INJECTION ONCE
Refills: 0 | Status: COMPLETED | OUTPATIENT
Start: 2020-01-31 | End: 2020-01-31

## 2020-01-31 RX ORDER — POTASSIUM CHLORIDE 20 MEQ
10 PACKET (EA) ORAL
Refills: 0 | Status: COMPLETED | OUTPATIENT
Start: 2020-01-31 | End: 2020-01-31

## 2020-01-31 RX ORDER — PROPOFOL 10 MG/ML
30 INJECTION, EMULSION INTRAVENOUS
Qty: 500 | Refills: 0 | Status: DISCONTINUED | OUTPATIENT
Start: 2020-01-31 | End: 2020-01-31

## 2020-01-31 RX ORDER — VANCOMYCIN HCL 1 G
750 VIAL (EA) INTRAVENOUS ONCE
Refills: 0 | Status: COMPLETED | OUTPATIENT
Start: 2020-01-31 | End: 2020-01-31

## 2020-01-31 RX ORDER — SODIUM CHLORIDE 9 MG/ML
1000 INJECTION, SOLUTION INTRAVENOUS
Refills: 0 | Status: DISCONTINUED | OUTPATIENT
Start: 2020-01-31 | End: 2020-01-31

## 2020-01-31 RX ORDER — FENTANYL CITRATE 50 UG/ML
25 INJECTION INTRAVENOUS
Refills: 0 | Status: DISCONTINUED | OUTPATIENT
Start: 2020-01-31 | End: 2020-01-31

## 2020-01-31 RX ORDER — PIPERACILLIN AND TAZOBACTAM 4; .5 G/20ML; G/20ML
3.38 INJECTION, POWDER, LYOPHILIZED, FOR SOLUTION INTRAVENOUS EVERY 8 HOURS
Refills: 0 | Status: COMPLETED | OUTPATIENT
Start: 2020-01-31 | End: 2020-02-03

## 2020-01-31 RX ORDER — CHLORHEXIDINE GLUCONATE 213 G/1000ML
15 SOLUTION TOPICAL EVERY 12 HOURS
Refills: 0 | Status: DISCONTINUED | OUTPATIENT
Start: 2020-01-31 | End: 2020-01-31

## 2020-01-31 RX ORDER — SODIUM CHLORIDE 9 MG/ML
1000 INJECTION, SOLUTION INTRAVENOUS
Refills: 0 | Status: DISCONTINUED | OUTPATIENT
Start: 2020-01-31 | End: 2020-02-01

## 2020-01-31 RX ORDER — SODIUM CHLORIDE 9 MG/ML
1000 INJECTION, SOLUTION INTRAVENOUS ONCE
Refills: 0 | Status: COMPLETED | OUTPATIENT
Start: 2020-01-31 | End: 2020-01-31

## 2020-01-31 RX ORDER — HYDROMORPHONE HYDROCHLORIDE 2 MG/ML
0.5 INJECTION INTRAMUSCULAR; INTRAVENOUS; SUBCUTANEOUS
Refills: 0 | Status: DISCONTINUED | OUTPATIENT
Start: 2020-01-31 | End: 2020-02-04

## 2020-01-31 RX ORDER — ENOXAPARIN SODIUM 100 MG/ML
30 INJECTION SUBCUTANEOUS DAILY
Refills: 0 | Status: DISCONTINUED | OUTPATIENT
Start: 2020-01-31 | End: 2020-02-04

## 2020-01-31 RX ORDER — SODIUM CHLORIDE 9 MG/ML
500 INJECTION, SOLUTION INTRAVENOUS ONCE
Refills: 0 | Status: COMPLETED | OUTPATIENT
Start: 2020-01-31 | End: 2020-01-31

## 2020-01-31 RX ORDER — CHLORHEXIDINE GLUCONATE 213 G/1000ML
1 SOLUTION TOPICAL DAILY
Refills: 0 | Status: DISCONTINUED | OUTPATIENT
Start: 2020-01-31 | End: 2020-02-03

## 2020-01-31 RX ADMIN — ENOXAPARIN SODIUM 30 MILLIGRAM(S): 100 INJECTION SUBCUTANEOUS at 12:13

## 2020-01-31 RX ADMIN — SODIUM CHLORIDE 100 MILLILITER(S): 9 INJECTION, SOLUTION INTRAVENOUS at 03:08

## 2020-01-31 RX ADMIN — PANTOPRAZOLE SODIUM 40 MILLIGRAM(S): 20 TABLET, DELAYED RELEASE ORAL at 12:13

## 2020-01-31 RX ADMIN — FENTANYL CITRATE 50 MICROGRAM(S): 50 INJECTION INTRAVENOUS at 09:20

## 2020-01-31 RX ADMIN — PROPOFOL 11.86 MICROGRAM(S)/KG/MIN: 10 INJECTION, EMULSION INTRAVENOUS at 03:09

## 2020-01-31 RX ADMIN — CHLORHEXIDINE GLUCONATE 1 APPLICATION(S): 213 SOLUTION TOPICAL at 12:15

## 2020-01-31 RX ADMIN — Medication 100 MILLIEQUIVALENT(S): at 05:27

## 2020-01-31 RX ADMIN — CHLORHEXIDINE GLUCONATE 15 MILLILITER(S): 213 SOLUTION TOPICAL at 05:07

## 2020-01-31 RX ADMIN — PIPERACILLIN AND TAZOBACTAM 25 GRAM(S): 4; .5 INJECTION, POWDER, LYOPHILIZED, FOR SOLUTION INTRAVENOUS at 23:00

## 2020-01-31 RX ADMIN — Medication 50 GRAM(S): at 03:08

## 2020-01-31 RX ADMIN — HYDROMORPHONE HYDROCHLORIDE 0.5 MILLIGRAM(S): 2 INJECTION INTRAMUSCULAR; INTRAVENOUS; SUBCUTANEOUS at 11:31

## 2020-01-31 RX ADMIN — PIPERACILLIN AND TAZOBACTAM 25 GRAM(S): 4; .5 INJECTION, POWDER, LYOPHILIZED, FOR SOLUTION INTRAVENOUS at 14:26

## 2020-01-31 RX ADMIN — HYDROMORPHONE HYDROCHLORIDE 0.5 MILLIGRAM(S): 2 INJECTION INTRAMUSCULAR; INTRAVENOUS; SUBCUTANEOUS at 19:56

## 2020-01-31 RX ADMIN — HYDROMORPHONE HYDROCHLORIDE 0.5 MILLIGRAM(S): 2 INJECTION INTRAMUSCULAR; INTRAVENOUS; SUBCUTANEOUS at 11:16

## 2020-01-31 RX ADMIN — Medication 250 MILLIGRAM(S): at 21:54

## 2020-01-31 RX ADMIN — PIPERACILLIN AND TAZOBACTAM 25 GRAM(S): 4; .5 INJECTION, POWDER, LYOPHILIZED, FOR SOLUTION INTRAVENOUS at 05:07

## 2020-01-31 RX ADMIN — HYDROMORPHONE HYDROCHLORIDE 0.5 MILLIGRAM(S): 2 INJECTION INTRAMUSCULAR; INTRAVENOUS; SUBCUTANEOUS at 20:11

## 2020-01-31 RX ADMIN — SODIUM CHLORIDE 1000 MILLILITER(S): 9 INJECTION, SOLUTION INTRAVENOUS at 02:00

## 2020-01-31 RX ADMIN — FENTANYL CITRATE 50 MICROGRAM(S): 50 INJECTION INTRAVENOUS at 08:58

## 2020-01-31 RX ADMIN — Medication 100 MILLIEQUIVALENT(S): at 04:20

## 2020-01-31 RX ADMIN — SODIUM CHLORIDE 1000 MILLILITER(S): 9 INJECTION, SOLUTION INTRAVENOUS at 04:21

## 2020-01-31 RX ADMIN — SODIUM CHLORIDE 100 MILLILITER(S): 9 INJECTION, SOLUTION INTRAVENOUS at 11:17

## 2020-01-31 NOTE — BRIEF OPERATIVE NOTE - OPERATION/FINDINGS
lower midline laparotomy.  hernia sac excised at umbilicus. Purulent ascites noted in lower abdomen with several areas of organizing abscess cavities, all broken up.  Severely thickened sigmoid colon with surrounding inflammatory changes suggestive of perforated diverticulitis.  No inflammation in upper abdomen.  Sigmoid resection done, distal margin at peritoneal reflection and proximal margin taken at distal descending colon.  hard mass noted on left ovary so oophorectomy performed.  end colostomy brought up through left abdominal wall.  Midline wound fascia closed with looped PDS, wound vac place.

## 2020-01-31 NOTE — PROGRESS NOTE ADULT - ASSESSMENT
74F with no PMHx and PSHx of R TKR 15 years ago and left TKR this past Friday (1/24) at [place] presenting with 4 days of abdominal pain and distension in the setting of constipation and a history of diverticulosis. Found to have significant pneumoperitoneum on CTAP concerning for perforated bowel, although the source of the perforation remains unclear, it is likely from diverticuli. Now s/p exploratory laparotomy, Salas procedure and left oophorectomy (1/30).     - Zosyn for intra-abdominal infection   - IVF   - NPO   - wean to extubate  - continue excellent care per SICU    Beatriz Bundy PA-C  [id number]  ACS

## 2020-01-31 NOTE — PHYSICAL THERAPY INITIAL EVALUATION ADULT - PRECAUTIONS/LIMITATIONS, REHAB EVAL
continued from above: Now s/p exploratory laparotomy, Salas procedure and left oophorectomy (1/30). Hospital course complicated by sepsis, interval requirement of pressors in the OR. Transferred to the SICU for further resuscitation and hemodynamic monitoring. continued from above: Now s/p exploratory laparotomy, Salas procedure and left oophorectomy (1/30). Hospital course complicated by sepsis, interval requirement of pressors in the OR. Transferred to the SICU for further resuscitation and hemodynamic monitoring./no known precautions/limitations

## 2020-01-31 NOTE — PHYSICAL THERAPY INITIAL EVALUATION ADULT - ADDITIONAL COMMENTS
As per pt, pt lives in a private house w/ spouse and ^ steps to enter w/ UHR. PTA pt was independent w/ all mobility w/ RW and ADLs.

## 2020-01-31 NOTE — OCCUPATIONAL THERAPY INITIAL EVALUATION ADULT - PRECAUTIONS/LIMITATIONS, REHAB EVAL
She thought it was constipation in the setting of taking narcotic pain medication for her knee replacement. On Monday, she had not passed a bowel movement since prior to the surgery so she started taking Miralax on Tuesday. She then passed a bowel movement on Tuesday after the Miralax but did not have much improvement with the pain. Over the next two days, the pain persisted and the distension worsened. Last bowel movement was this morning, somewhat small in size. She denies any fevers or chills, denies nausea or vomiting but has had a poor appetite and has had limited PO intake. She has been able to tolerate home PT and has been carrying out her normal ADLs despite the abdominal pain. Today she presented to her PMD this morning for the abdominal pain. The PMD noted a low WBC and anemia and recommend iron supplementation and for her to have a CT of the abdomen and pelvis. She had the CTAP at a [facility] and got a call from her PMD shortly thereafter while she was eating lunch advising her to present to the ED for concerns of "free air" in her abdomen.

## 2020-01-31 NOTE — INPATIENT CERTIFICATION FOR MEDICARE PATIENTS - RISKS OF ADVERSE EVENTS
Concern for delay in diagnosis and treatment/Concern for worsening infectious process/Concern for cardiopulmonary deterioration

## 2020-01-31 NOTE — OCCUPATIONAL THERAPY INITIAL EVALUATION ADULT - LIVES WITH, PROFILE
Pt lives with  in private home, I in ADLs and ambulates with RW prior to admission (recent knee surgery), walk in shower

## 2020-01-31 NOTE — PHYSICAL THERAPY INITIAL EVALUATION ADULT - IMPAIRMENTS FOUND, PT EVAL
aerobic capacity/endurance/gross motor/gait, locomotion, and balance gross motor/gait, locomotion, and balance/integumentary integrity/aerobic capacity/endurance

## 2020-01-31 NOTE — PHYSICAL THERAPY INITIAL EVALUATION ADULT - PLANNED THERAPY INTERVENTIONS, PT EVAL
GOAL: Pt will perform 12 stairs with or without U HR as needed within 3-4weeks./balance training/bed mobility training/gait training/transfer training GOAL: Pt will perform 12 stairs with or without U HR as needed within 3-4weeks.; Negative Pressure Wound Therapy/gait training/balance training/transfer training/bed mobility training

## 2020-01-31 NOTE — PHYSICAL THERAPY INITIAL EVALUATION ADULT - MODALITIES TREATMENT COMMENTS
PAtient with full thickness midline abd surgical wound with predominately peach/pink colored adipose present

## 2020-01-31 NOTE — CONSULT NOTE ADULT - ASSESSMENT
IMPRESSION    PLAN BY SYSTEMS  Neurological:    -- GCS ***.  -- Pain control: ***  -- Home medications: ***  -- PLAN: ***     Pulmonary:  -- Saturating ***% on ***.  -- ABG: ***  -- CXR this AM: ***  -- PLAN: ***    Cardiovascular:    -- HR ***, BP ***, MAP ***  -- Patient is off of all pressor support.  -- Home medications: ***  -- Current medications: ***  -- PLAN: ***    Renal:  -- Na ***, K ***, Cl ***, HCO3 ***, BUN ***, Cr ***, ionized Ca ***, Phos ***, Mg ***.  -- Fluid balance:    -- UCI. Urine output *** yesterday, *** so far today.  -- mIVF: ***  -- PLAN: ***    Hematological:  -- Hemoglobin ***, platelets ***  -- Goal hemoglobin >***.  -- PLAN: Continue to monitor CBC.    Infectious Disease:  -- Tc ***, Tmax ***, WBC count ***.  -- Cultures: ***  -- Antibiotics: ***  -- PLAN: Follow-up cultures and continue antibiotics.    GI:  -- Diet: ***  -- NGT output: ***  -- Last recorded BM: ***  -- Bowel regimen: ***  -- PPI: ***  -- PLAN: ***    Endocrine:  -- Diabetes: ***  -- Current regimen: ***  -- Glucose range ***  -- PLAN: ***    Musculoskeletal:  -- PLAN:     Prophylaxis:  -- DVT PPx: ***  -- GI PPx: ***    Access:    -- Lines: ***  -- PLAN: ***    FULL CODE, discussed. IMPRESSION    PLAN BY SYSTEMS  Neurological:    -- Sedated on Propofol. PRN Fentanyl.   -- Transition to non-sedating      Pulmonary:  -- Mechanically ventilated on 14/500/5/50%  -- PLAN: Wean to extubate in the AM    Cardiovascular:  -- Patient is off of all pressor support.  -- Resume pressors as needed to maintain MAP > 65     Renal:  -- LR @ 100 cc/hr  -- Akins catheter in place, strict I's & O's  -- Replete electrolytes PRN    Hematological:  -- H&H stable. S/p 2 units PRBC intra-op  -- Goal hemoglobin > 7 g/dL.  -- DVT prophylaxis on hold at this time. Pre-op INR 2.10  -- PLAN: Continue to monitor CBC.    Infectious Disease:  -- Afebrile. WBC moderately elevated at 11.1  -- Cultures: Ordered, and pending.  -- Antibiotics: Zosyn x4 days  -- PLAN: Follow-up cultures and continue antibiotics.    GI:  -- NPO    Endocrine:  -- No active issues    Musculoskeletal:  -- PLAN: S/p left knee replacement. Prevena vac in place over left knee.      FULL CODE, discussed. IMPRESSION  Betty Rowe is a 74 year old F who is s/p Salas's for a perforated diverticulitis. Hospital course complicated by sepsis, interval requirement of pressors in the OR. Transferred in stable condition to the SICU for further resuscitation and hemodynamic monitoring.     PLAN BY SYSTEMS  Neurological:    -- Sedated on Propofol. PRN Fentanyl.   -- Transition to non-sedating      Pulmonary:  -- Mechanically ventilated on 14/500/5/50%  -- PLAN: Wean to extubate in the AM    Cardiovascular:  -- Patient is off of all pressor support.  -- Resume pressors as needed to maintain MAP > 65     Renal:  -- LR @ 100 cc/hr  -- Akins catheter in place, strict I's & O's  -- Replete electrolytes PRN    Hematological:  -- H&H stable. S/p 2 units PRBC intra-op  -- Goal hemoglobin > 7 g/dL.  -- DVT prophylaxis on hold at this time. Pre-op INR 2.10  -- PLAN: Continue to monitor CBC.    Infectious Disease:  -- Afebrile. WBC moderately elevated at 11.1  -- Cultures: Ordered, and pending.  -- Antibiotics: Zosyn x4 days  -- PLAN: Follow-up cultures and continue antibiotics.    GI:  -- NPO    Endocrine:  -- No active issues    Musculoskeletal:  -- PLAN: S/p left knee replacement. Prevena vac in place over left knee.      FULL CODE, discussed. IMPRESSION  Betty Rowe is a 74 year old F who is s/p Salas's for a perforated diverticulitis. Hospital course complicated by sepsis, interval requirement of pressors in the OR. Transferred in stable condition to the SICU for further resuscitation and hemodynamic monitoring.     PLAN BY SYSTEMS  Neurological:    -- Sedated on Propofol. Wean off sedation   -- Transition to non-sedating   --D/c Fentanyl   --Start Dilauded prn for pain control      Pulmonary:  -- Mechanically ventilated on 14/500/5/50%  -- PLAN: Wean to extubate in the AM    Cardiovascular:  -- Patient is off of all pressor support.  -- Resume pressors as needed to maintain MAP > 65     Renal:  -- D5 1/2NS w/ Sodium Bicarb at 100cc/hr   -- Akins catheter in place, strict I's & O's  -- Replete electrolytes PRN    Hematological:  -- H&H stable. S/p 2 units PRBC intra-op  -- Goal hemoglobin > 7 g/dL.  -- DVT prophylaxis on hold at this time. Pre-op INR 2.10  -- PLAN: Continue to monitor CBC.    Infectious Disease:  -- Afebrile. WBC moderately elevated at 11.1  -- Cultures: Ordered, and pending.  -- Antibiotics: Zosyn x4 days  -- PLAN: Follow-up cultures and continue antibiotics.    GI:  -- NPO    Endocrine:  -- No active issues    Musculoskeletal:  -- PLAN: S/p left knee replacement. Prevena vac in place over left knee.      FULL CODE, discussed.

## 2020-01-31 NOTE — PATIENT PROFILE ADULT - EQUIPMENT CURRENTLY USED AT HOME
Health Maintenance Summary     Topic Due On Due Status Completed On    Pap Smear - Cervical Cancer Screening  Feb 12, 2020 Not Due Feb 12, 2015    Immunization - Td/Tdap Miguel 15, 2019 Not Due Miguel 15, 2009    Immunization - TDAP Pregnancy  Hidden     Immunization-Influenza  Completed Nov 28, 2016          Patient is up to date, no discussion needed .       yes

## 2020-01-31 NOTE — PROGRESS NOTE ADULT - SUBJECTIVE AND OBJECTIVE BOX
SURGERY DAILY PROGRESS NOTE:       SUBJECTIVE/ROS: Patient seen and evaluated at the bedside on AM rounds. Patient intubated and awake, able to follow commands. Unable to give subjective secondary to intubation. SICU was consulted post operatively for hemodynamic monitoring. Daughter at bedside.        OBJECTIVE:    Vital Signs Last 24 Hrs  T(C): 36.4 (2020 03:00), Max: 36.7 (2020 01:10)  T(F): 97.5 (2020 03:00), Max: 98.1 (2020 01:10)  HR: 97 (2020 07:15) (90 - 109)  BP: 89/54 (2020 01:45) (89/54 - 127/71)  BP(mean): 64 (2020 01:45) (64 - 92)  RR: 17 (2020 07:15) (14 - 20)  SpO2: 98% (2020 07:15) (98% - 100%)  I&O's Detail    2020 07:01  -  2020 07:00  --------------------------------------------------------  IN:    IV PiggyBack: 250 mL    Lactated Ringers IV Bolus: 1500 mL    lactated ringers.: 400 mL    propofol Infusion: 49 mL    Solution: 50 mL  Total IN: 2249 mL    OUT:    Indwelling Catheter - Urethral: 85 mL  Total OUT: 85 mL    Total NET: 2164 mL        Daily Height in cm: 160 (2020 01:10)    Daily Weight in k.9 (2020 01:10)  MEDICATIONS  (STANDING):  chlorhexidine 0.12% Liquid 15 milliLiter(s) Oral Mucosa every 12 hours  chlorhexidine 2% Cloths 1 Application(s) Topical daily  lactated ringers. 1000 milliLiter(s) (100 mL/Hr) IV Continuous <Continuous>  pantoprazole  Injectable 40 milliGRAM(s) IV Push daily  piperacillin/tazobactam IVPB.. 3.375 Gram(s) IV Intermittent every 8 hours  propofol Infusion 30 MICROgram(s)/kG/Min (11.862 mL/Hr) IV Continuous <Continuous>    MEDICATIONS  (PRN):  fentaNYL    Injectable 25 MICROGram(s) IV Push every 2 hours PRN Moderate Pain (4 - 6)  fentaNYL    Injectable 50 MICROGram(s) IV Push every 2 hours PRN Severe Pain (7 - 10)      LABS:                        11.5   4.69  )-----------( 254      ( 2020 02:11 )             36.7     -    133<L>  |  103  |  42<H>  ----------------------------<  115<H>  3.5   |  13<L>  |  2.03<H>    Ca    8.5      2020 02:11  Phos  4.6       Mg     1.8         TPro  6.2  /  Alb  3.0<L>  /  TBili  1.3<H>  /  DBili  x   /  AST  28  /  ALT  24  /  AlkPhos  129<H>  -30    PT/INR - ( 2020 02:13 )   PT: 16.7 sec;   INR: 1.44 ratio         PTT - ( 2020 02:13 )  PTT:29.7 sec          PHYSICAL EXAM:  General: NAD  HEENT: NC/AT  Pulmonary: intubated   Cardiovascular: NSR, no murmurs  Abdominal: soft, non distended, ostomy pink with minimal gas and bowel sweat in the ostomy bag, midline wound vac in place with good suction  Extremities: Left knee dressing in place c/d/i, erythematous

## 2020-01-31 NOTE — AIRWAY REMOVAL NOTE  ADULT & PEDS - ARTIFICAL AIRWAY REMOVAL COMMENTS
written order for extubation verified. The patient was identified by full name and birth date compared to the identification band.  Present during the procedure was DEBRA Caputo

## 2020-01-31 NOTE — OCCUPATIONAL THERAPY INITIAL EVALUATION ADULT - ADDITIONAL COMMENTS
XRay L knee: Postsurgical changes status post right knee arthroplasty. No displaced periprosthetic fracture or abnormal periprosthetic lucencies are seen.  BLE Dopplers: (-)

## 2020-01-31 NOTE — OCCUPATIONAL THERAPY INITIAL EVALUATION ADULT - PERTINENT HX OF CURRENT PROBLEM, REHAB EVAL
75 yo F with no PMHx, PSHx of R TKR 15 years ago and left TKR this past Friday at [facility] presenting with 4 days of abdominal pain and distension. After her LTKR last week, she was discharged home on Sunday with [id number] daily for 12 days DVT ppx and oxycodone for pain control. On Monday morning she developed abdominal pain and distension. She describes the pain as a severe ache, worst in the LLQ. See below

## 2020-01-31 NOTE — BRIEF OPERATIVE NOTE - NSICDXBRIEFPROCEDURE_GEN_ALL_CORE_FT
PROCEDURES:  Left oophorectomy 31-Jan-2020 00:48:17  Morelia Roger  Colectomy with end colostomy 31-Jan-2020 00:48:06  Morelia Roger  Open resection, sigmoid colon 31-Jan-2020 00:47:49  Morelia Roger

## 2020-01-31 NOTE — CHART NOTE - NSCHARTNOTEFT_GEN_A_CORE
STATUS POST: Ex lap, Salas left oophorectomy     POST OPERATIVE DAY #: 0    SUBJECTIVE: Pt seen and examined at bedside, patient intubated and unable to give subjective. Due to acidosis in OR, patient remained intubated post operatively       Vital Signs Last 24 Hrs  T(C): 36.4 (31 Jan 2020 03:00), Max: 36.7 (31 Jan 2020 01:10)  T(F): 97.5 (31 Jan 2020 03:00), Max: 98.1 (31 Jan 2020 01:10)  HR: 100 (31 Jan 2020 03:45) (90 - 109)  BP: 89/54 (31 Jan 2020 01:45) (89/54 - 127/71)  BP(mean): 64 (31 Jan 2020 01:45) (64 - 92)  RR: 17 (31 Jan 2020 03:45) (14 - 20)  SpO2: 99% (31 Jan 2020 03:45) (98% - 100%)  I&O's Summary    30 Jan 2020 07:01  -  31 Jan 2020 04:04  --------------------------------------------------------  IN: 1125.4 mL / OUT: 60 mL / NET: 1065.4 mL      I&O's Detail    30 Jan 2020 07:01  -  31 Jan 2020 04:04  --------------------------------------------------------  IN:    Lactated Ringers IV Bolus: 1000 mL    lactated ringers.: 100 mL    propofol Infusion: 25.4 mL  Total IN: 1125.4 mL    OUT:    Indwelling Catheter - Urethral: 60 mL  Total OUT: 60 mL    Total NET: 1065.4 mL          MEDICATIONS  (STANDING):  chlorhexidine 0.12% Liquid 15 milliLiter(s) Oral Mucosa every 12 hours  chlorhexidine 2% Cloths 1 Application(s) Topical daily  lactated ringers. 1000 milliLiter(s) (100 mL/Hr) IV Continuous <Continuous>  pantoprazole  Injectable 40 milliGRAM(s) IV Push daily  piperacillin/tazobactam IVPB.. 3.375 Gram(s) IV Intermittent every 8 hours  potassium chloride  10 mEq/100 mL IVPB 10 milliEquivalent(s) IV Intermittent every 1 hour  propofol Infusion 30 MICROgram(s)/kG/Min (11.862 mL/Hr) IV Continuous <Continuous>    MEDICATIONS  (PRN):  fentaNYL    Injectable 25 MICROGram(s) IV Push every 2 hours PRN Moderate Pain (4 - 6)  fentaNYL    Injectable 50 MICROGram(s) IV Push every 2 hours PRN Severe Pain (7 - 10)      LABS:                        11.5   4.69  )-----------( 254      ( 31 Jan 2020 02:11 )             36.7     01-31    133<L>  |  103  |  42<H>  ----------------------------<  115<H>  3.5   |  13<L>  |  2.03<H>    Ca    8.5      31 Jan 2020 02:11  Phos  4.6     01-31  Mg     1.8     01-31    TPro  6.2  /  Alb  3.0<L>  /  TBili  1.3<H>  /  DBili  x   /  AST  28  /  ALT  24  /  AlkPhos  129<H>  01-30    PT/INR - ( 31 Jan 2020 02:13 )   PT: 16.7 sec;   INR: 1.44 ratio         PTT - ( 31 Jan 2020 02:13 )  PTT:29.7 sec      RADIOLOGY & ADDITIONAL STUDIES:    Physical Exam:  General: NAD  HEENT: NC/AT  Pulmonary: intubated   Cardiovascular: NSR, no murmurs  Abdominal: soft, non distended, ostomy pink with minimal gas in the ostomy bag, midline vac in place with good suction  Extremities: Left knee dressing in place c/d/i, leg moderately ecchymotic       A/P: 74y Female s/p as above   - Continue excellent sicu care  - f/u am labs  - resuscitation   - Pain medication  - DVT ppx

## 2020-01-31 NOTE — OCCUPATIONAL THERAPY INITIAL EVALUATION ADULT - NS ASR FOLLOW COMMAND OT EVAL
What Is The Reason For Today's Visit?: Full Body Skin Examination What Is The Reason For Today's Visit? (Being Monitored For X): concerning skin lesions on an annual basis How Severe Are Your Spot(S)?: mild able to follow multistep instructions/100% of the time

## 2020-01-31 NOTE — PHYSICAL THERAPY INITIAL EVALUATION ADULT - PERTINENT HX OF CURRENT PROBLEM, REHAB EVAL
Pt is a 74F with no PMHx and PSHx of R TKR 15 years ago and left TKR this past Friday (1/24) at [facility] presenting with 4 days of abdominal pain and distension in the setting of constipation and a history of diverticulosis. Found to have significant pneumoperitoneum on CTAP concerning for perforated bowel, although the source of the perforation remains unclear

## 2020-02-01 LAB
ANION GAP SERPL CALC-SCNC: 13 MMOL/L — SIGNIFICANT CHANGE UP (ref 5–17)
ANION GAP SERPL CALC-SCNC: 14 MMOL/L — SIGNIFICANT CHANGE UP (ref 5–17)
ANION GAP SERPL CALC-SCNC: 14 MMOL/L — SIGNIFICANT CHANGE UP (ref 5–17)
ANION GAP SERPL CALC-SCNC: 16 MMOL/L — SIGNIFICANT CHANGE UP (ref 5–17)
APTT BLD: 26.8 SEC — LOW (ref 27.5–36.3)
BASOPHILS # BLD AUTO: 0.03 K/UL — SIGNIFICANT CHANGE UP (ref 0–0.2)
BASOPHILS NFR BLD AUTO: 0.2 % — SIGNIFICANT CHANGE UP (ref 0–2)
BUN SERPL-MCNC: 49 MG/DL — HIGH (ref 7–23)
BUN SERPL-MCNC: 50 MG/DL — HIGH (ref 7–23)
BUN SERPL-MCNC: 50 MG/DL — HIGH (ref 7–23)
BUN SERPL-MCNC: 51 MG/DL — HIGH (ref 7–23)
CALCIUM SERPL-MCNC: 8.4 MG/DL — SIGNIFICANT CHANGE UP (ref 8.4–10.5)
CALCIUM SERPL-MCNC: 8.5 MG/DL — SIGNIFICANT CHANGE UP (ref 8.4–10.5)
CALCIUM SERPL-MCNC: 8.6 MG/DL — SIGNIFICANT CHANGE UP (ref 8.4–10.5)
CALCIUM SERPL-MCNC: 8.9 MG/DL — SIGNIFICANT CHANGE UP (ref 8.4–10.5)
CHLORIDE SERPL-SCNC: 101 MMOL/L — SIGNIFICANT CHANGE UP (ref 96–108)
CHLORIDE SERPL-SCNC: 101 MMOL/L — SIGNIFICANT CHANGE UP (ref 96–108)
CHLORIDE SERPL-SCNC: 102 MMOL/L — SIGNIFICANT CHANGE UP (ref 96–108)
CHLORIDE SERPL-SCNC: 104 MMOL/L — SIGNIFICANT CHANGE UP (ref 96–108)
CO2 SERPL-SCNC: 16 MMOL/L — LOW (ref 22–31)
CO2 SERPL-SCNC: 16 MMOL/L — LOW (ref 22–31)
CO2 SERPL-SCNC: 17 MMOL/L — LOW (ref 22–31)
CO2 SERPL-SCNC: 19 MMOL/L — LOW (ref 22–31)
CREAT SERPL-MCNC: 2 MG/DL — HIGH (ref 0.5–1.3)
CREAT SERPL-MCNC: 2.24 MG/DL — HIGH (ref 0.5–1.3)
CREAT SERPL-MCNC: 2.53 MG/DL — HIGH (ref 0.5–1.3)
CREAT SERPL-MCNC: 2.55 MG/DL — HIGH (ref 0.5–1.3)
EOSINOPHIL # BLD AUTO: 0.08 K/UL — SIGNIFICANT CHANGE UP (ref 0–0.5)
EOSINOPHIL NFR BLD AUTO: 0.6 % — SIGNIFICANT CHANGE UP (ref 0–6)
GLUCOSE SERPL-MCNC: 111 MG/DL — HIGH (ref 70–99)
GLUCOSE SERPL-MCNC: 119 MG/DL — HIGH (ref 70–99)
GLUCOSE SERPL-MCNC: 127 MG/DL — HIGH (ref 70–99)
GLUCOSE SERPL-MCNC: 132 MG/DL — HIGH (ref 70–99)
HCT VFR BLD CALC: 24.9 % — LOW (ref 34.5–45)
HCT VFR BLD CALC: 27.6 % — LOW (ref 34.5–45)
HCT VFR BLD CALC: 28.6 % — LOW (ref 34.5–45)
HCT VFR BLD CALC: 29 % — LOW (ref 34.5–45)
HGB BLD-MCNC: 8.4 G/DL — LOW (ref 11.5–15.5)
HGB BLD-MCNC: 9.1 G/DL — LOW (ref 11.5–15.5)
HGB BLD-MCNC: 9.2 G/DL — LOW (ref 11.5–15.5)
HGB BLD-MCNC: 9.6 G/DL — LOW (ref 11.5–15.5)
IMM GRANULOCYTES NFR BLD AUTO: 3.6 % — HIGH (ref 0–1.5)
INR BLD: 1.13 RATIO — SIGNIFICANT CHANGE UP (ref 0.88–1.16)
LYMPHOCYTES # BLD AUTO: 0.67 K/UL — LOW (ref 1–3.3)
LYMPHOCYTES # BLD AUTO: 5.1 % — LOW (ref 13–44)
MAGNESIUM SERPL-MCNC: 2.4 MG/DL — SIGNIFICANT CHANGE UP (ref 1.6–2.6)
MAGNESIUM SERPL-MCNC: 2.5 MG/DL — SIGNIFICANT CHANGE UP (ref 1.6–2.6)
MAGNESIUM SERPL-MCNC: 2.5 MG/DL — SIGNIFICANT CHANGE UP (ref 1.6–2.6)
MCHC RBC-ENTMCNC: 28.5 PG — SIGNIFICANT CHANGE UP (ref 27–34)
MCHC RBC-ENTMCNC: 29.2 PG — SIGNIFICANT CHANGE UP (ref 27–34)
MCHC RBC-ENTMCNC: 29.2 PG — SIGNIFICANT CHANGE UP (ref 27–34)
MCHC RBC-ENTMCNC: 29.3 PG — SIGNIFICANT CHANGE UP (ref 27–34)
MCHC RBC-ENTMCNC: 31.7 GM/DL — LOW (ref 32–36)
MCHC RBC-ENTMCNC: 33 GM/DL — SIGNIFICANT CHANGE UP (ref 32–36)
MCHC RBC-ENTMCNC: 33.6 GM/DL — SIGNIFICANT CHANGE UP (ref 32–36)
MCHC RBC-ENTMCNC: 33.7 GM/DL — SIGNIFICANT CHANGE UP (ref 32–36)
MCV RBC AUTO: 86.5 FL — SIGNIFICANT CHANGE UP (ref 80–100)
MCV RBC AUTO: 87.2 FL — SIGNIFICANT CHANGE UP (ref 80–100)
MCV RBC AUTO: 88.5 FL — SIGNIFICANT CHANGE UP (ref 80–100)
MCV RBC AUTO: 89.8 FL — SIGNIFICANT CHANGE UP (ref 80–100)
MONOCYTES # BLD AUTO: 0.32 K/UL — SIGNIFICANT CHANGE UP (ref 0–0.9)
MONOCYTES NFR BLD AUTO: 2.5 % — SIGNIFICANT CHANGE UP (ref 2–14)
NEUTROPHILS # BLD AUTO: 11.49 K/UL — HIGH (ref 1.8–7.4)
NEUTROPHILS NFR BLD AUTO: 88 % — HIGH (ref 43–77)
NRBC # BLD: 0 /100 WBCS — SIGNIFICANT CHANGE UP (ref 0–0)
PHOSPHATE SERPL-MCNC: 4 MG/DL — SIGNIFICANT CHANGE UP (ref 2.5–4.5)
PHOSPHATE SERPL-MCNC: 4.1 MG/DL — SIGNIFICANT CHANGE UP (ref 2.5–4.5)
PHOSPHATE SERPL-MCNC: 4.2 MG/DL — SIGNIFICANT CHANGE UP (ref 2.5–4.5)
PLATELET # BLD AUTO: 190 K/UL — SIGNIFICANT CHANGE UP (ref 150–400)
PLATELET # BLD AUTO: 199 K/UL — SIGNIFICANT CHANGE UP (ref 150–400)
PLATELET # BLD AUTO: 210 K/UL — SIGNIFICANT CHANGE UP (ref 150–400)
PLATELET # BLD AUTO: 214 K/UL — SIGNIFICANT CHANGE UP (ref 150–400)
POTASSIUM SERPL-MCNC: 3.7 MMOL/L — SIGNIFICANT CHANGE UP (ref 3.5–5.3)
POTASSIUM SERPL-MCNC: 3.9 MMOL/L — SIGNIFICANT CHANGE UP (ref 3.5–5.3)
POTASSIUM SERPL-MCNC: 4.1 MMOL/L — SIGNIFICANT CHANGE UP (ref 3.5–5.3)
POTASSIUM SERPL-MCNC: 4.5 MMOL/L — SIGNIFICANT CHANGE UP (ref 3.5–5.3)
POTASSIUM SERPL-SCNC: 3.7 MMOL/L — SIGNIFICANT CHANGE UP (ref 3.5–5.3)
POTASSIUM SERPL-SCNC: 3.9 MMOL/L — SIGNIFICANT CHANGE UP (ref 3.5–5.3)
POTASSIUM SERPL-SCNC: 4.1 MMOL/L — SIGNIFICANT CHANGE UP (ref 3.5–5.3)
POTASSIUM SERPL-SCNC: 4.5 MMOL/L — SIGNIFICANT CHANGE UP (ref 3.5–5.3)
PROTHROM AB SERPL-ACNC: 12.9 SEC — SIGNIFICANT CHANGE UP (ref 10–12.9)
RBC # BLD: 2.88 M/UL — LOW (ref 3.8–5.2)
RBC # BLD: 3.12 M/UL — LOW (ref 3.8–5.2)
RBC # BLD: 3.23 M/UL — LOW (ref 3.8–5.2)
RBC # BLD: 3.28 M/UL — LOW (ref 3.8–5.2)
RBC # FLD: 15.1 % — HIGH (ref 10.3–14.5)
RBC # FLD: 15.3 % — HIGH (ref 10.3–14.5)
RBC # FLD: 15.4 % — HIGH (ref 10.3–14.5)
RBC # FLD: 15.4 % — HIGH (ref 10.3–14.5)
SODIUM SERPL-SCNC: 131 MMOL/L — LOW (ref 135–145)
SODIUM SERPL-SCNC: 133 MMOL/L — LOW (ref 135–145)
SODIUM SERPL-SCNC: 134 MMOL/L — LOW (ref 135–145)
SODIUM SERPL-SCNC: 135 MMOL/L — SIGNIFICANT CHANGE UP (ref 135–145)
VANCOMYCIN TROUGH SERPL-MCNC: 6.6 UG/ML — LOW (ref 10–20)
WBC # BLD: 12.32 K/UL — HIGH (ref 3.8–10.5)
WBC # BLD: 13.06 K/UL — HIGH (ref 3.8–10.5)
WBC # BLD: 9.77 K/UL — SIGNIFICANT CHANGE UP (ref 3.8–10.5)
WBC # BLD: 9.85 K/UL — SIGNIFICANT CHANGE UP (ref 3.8–10.5)
WBC # FLD AUTO: 12.32 K/UL — HIGH (ref 3.8–10.5)
WBC # FLD AUTO: 13.06 K/UL — HIGH (ref 3.8–10.5)
WBC # FLD AUTO: 9.77 K/UL — SIGNIFICANT CHANGE UP (ref 3.8–10.5)
WBC # FLD AUTO: 9.85 K/UL — SIGNIFICANT CHANGE UP (ref 3.8–10.5)

## 2020-02-01 PROCEDURE — 74018 RADEX ABDOMEN 1 VIEW: CPT | Mod: 26

## 2020-02-01 PROCEDURE — 99233 SBSQ HOSP IP/OBS HIGH 50: CPT

## 2020-02-01 PROCEDURE — 71045 X-RAY EXAM CHEST 1 VIEW: CPT | Mod: 26

## 2020-02-01 RX ORDER — VANCOMYCIN HCL 1 G
1000 VIAL (EA) INTRAVENOUS ONCE
Refills: 0 | Status: COMPLETED | OUTPATIENT
Start: 2020-02-01 | End: 2020-02-01

## 2020-02-01 RX ORDER — SODIUM CHLORIDE 9 MG/ML
1000 INJECTION, SOLUTION INTRAVENOUS
Refills: 0 | Status: DISCONTINUED | OUTPATIENT
Start: 2020-02-01 | End: 2020-02-02

## 2020-02-01 RX ORDER — BENZOCAINE AND MENTHOL 5; 1 G/100ML; G/100ML
1 LIQUID ORAL EVERY 4 HOURS
Refills: 0 | Status: DISCONTINUED | OUTPATIENT
Start: 2020-02-01 | End: 2020-02-11

## 2020-02-01 RX ORDER — ACETAMINOPHEN 500 MG
1000 TABLET ORAL EVERY 6 HOURS
Refills: 0 | Status: COMPLETED | OUTPATIENT
Start: 2020-02-01 | End: 2020-02-02

## 2020-02-01 RX ORDER — ACETAMINOPHEN 500 MG
1000 TABLET ORAL ONCE
Refills: 0 | Status: COMPLETED | OUTPATIENT
Start: 2020-02-01 | End: 2020-02-01

## 2020-02-01 RX ORDER — VANCOMYCIN HCL 1 G
750 VIAL (EA) INTRAVENOUS ONCE
Refills: 0 | Status: DISCONTINUED | OUTPATIENT
Start: 2020-02-01 | End: 2020-02-01

## 2020-02-01 RX ORDER — POTASSIUM CHLORIDE 20 MEQ
10 PACKET (EA) ORAL ONCE
Refills: 0 | Status: COMPLETED | OUTPATIENT
Start: 2020-02-01 | End: 2020-02-01

## 2020-02-01 RX ADMIN — Medication 1000 MILLIGRAM(S): at 17:00

## 2020-02-01 RX ADMIN — Medication 400 MILLIGRAM(S): at 13:10

## 2020-02-01 RX ADMIN — BENZOCAINE AND MENTHOL 1 LOZENGE: 5; 1 LIQUID ORAL at 16:42

## 2020-02-01 RX ADMIN — SODIUM CHLORIDE 75 MILLILITER(S): 9 INJECTION, SOLUTION INTRAVENOUS at 11:10

## 2020-02-01 RX ADMIN — ENOXAPARIN SODIUM 30 MILLIGRAM(S): 100 INJECTION SUBCUTANEOUS at 13:11

## 2020-02-01 RX ADMIN — CHLORHEXIDINE GLUCONATE 1 APPLICATION(S): 213 SOLUTION TOPICAL at 13:10

## 2020-02-01 RX ADMIN — PIPERACILLIN AND TAZOBACTAM 25 GRAM(S): 4; .5 INJECTION, POWDER, LYOPHILIZED, FOR SOLUTION INTRAVENOUS at 14:00

## 2020-02-01 RX ADMIN — BENZOCAINE AND MENTHOL 1 LOZENGE: 5; 1 LIQUID ORAL at 11:09

## 2020-02-01 RX ADMIN — SODIUM CHLORIDE 100 MILLILITER(S): 9 INJECTION, SOLUTION INTRAVENOUS at 06:30

## 2020-02-01 RX ADMIN — Medication 100 MILLIEQUIVALENT(S): at 03:15

## 2020-02-01 RX ADMIN — Medication 400 MILLIGRAM(S): at 18:00

## 2020-02-01 RX ADMIN — SODIUM CHLORIDE 75 MILLILITER(S): 9 INJECTION, SOLUTION INTRAVENOUS at 22:45

## 2020-02-01 RX ADMIN — Medication 1000 MILLIGRAM(S): at 14:36

## 2020-02-01 RX ADMIN — PANTOPRAZOLE SODIUM 40 MILLIGRAM(S): 20 TABLET, DELAYED RELEASE ORAL at 13:11

## 2020-02-01 RX ADMIN — Medication 400 MILLIGRAM(S): at 05:28

## 2020-02-01 RX ADMIN — PIPERACILLIN AND TAZOBACTAM 25 GRAM(S): 4; .5 INJECTION, POWDER, LYOPHILIZED, FOR SOLUTION INTRAVENOUS at 22:45

## 2020-02-01 RX ADMIN — PIPERACILLIN AND TAZOBACTAM 25 GRAM(S): 4; .5 INJECTION, POWDER, LYOPHILIZED, FOR SOLUTION INTRAVENOUS at 06:30

## 2020-02-01 RX ADMIN — Medication 250 MILLIGRAM(S): at 17:43

## 2020-02-01 RX ADMIN — Medication 1000 MILLIGRAM(S): at 05:43

## 2020-02-01 RX ADMIN — BENZOCAINE AND MENTHOL 1 LOZENGE: 5; 1 LIQUID ORAL at 22:45

## 2020-02-01 NOTE — PROVIDER CONTACT NOTE (OTHER) - SITUATION
Pt. getting ready for AM care and abdomen extremely tender to touch. Pt. also delirious and states she sees little children running around the room.

## 2020-02-01 NOTE — PROGRESS NOTE ADULT - SUBJECTIVE AND OBJECTIVE BOX
HISTORY  Betty Rowe is a 74 year old F with no PMHx, PSHx of R TKR 15 years ago and left TKR this past Friday at [facility] recently admitted after presenting with 4 days of abdominal pain not associated with nausea, vomiting, fevers or chills. The patient had worsening abdominal pain and distension that failed to improved over the course of the week prompting her to go see her PMD. A CT abdomen pelvis was obtained at an outside facility which revealed findings concerning for free air. The patient was advised to present to the ED. Upon arrival, she was found to be hypotensive to the 90's, and tachycardic to the low 100's. The patient was resuscitated and started on antibiotics due to concerns for a colonic perforation and was taken to the operating room emergently. In the OR, the patient was found to have purulent ascites with several areas of organizing abscess cavities. She was also found to have a severely thickened sigmoid colon with surrounding inflammatory changes suggestive of perforated diverticulitis. A Luisana's procedure was performed and the patient was transferred to the SICU for post-operative hemodynamic monitoring and further resuscitation.     In the OR, the patient received 2 units pRBC, 1.5 L of IV fluids,  cc,  cc.     24 HOUR EVENTS:  - Extubated  - Fent switched to dilaudid  - Started D5 1/2 NS + 75 bicarb  - DVT PPx w/ Love 30  - Removed L TKA dressing  - Ostomy consulted  - Artem PT for wound vac change 3x/wk starting Monday 2/3/2020  - Will resume Eliquis when trauma clears    SUBJECTIVE/ROS:  [x ] A ten-point review of systems was otherwise negative except as noted.  [ ] Due to altered mental status/intubation, subjective information were not able to be obtained from the patient. History was obtained, to the extent possible, from review of the chart and collateral sources of information.      NEURO  Exam: AOx3  Meds: HYDROmorphone  Injectable 0.5 milliGRAM(s) IV Push every 3 hours PRN Severe Pain (7 - 10)    [x] Adequacy of sedation and pain control has been assessed and adjusted      RESPIRATORY  RR: 24 (01-31-20 @ 23:00) (13 - 35)  SpO2: 96% (01-31-20 @ 23:00) (94% - 100%)  Wt(kg): --  Exam: unlabored, clear to auscultation bilaterally  Mechanical Ventilation: Mode: off, RR (patient): 18, FiO2: 40  ABG - ( 31 Jan 2020 08:54 )  pH: 7.34  /  pCO2: 28    /  pO2: 117   / HCO3: 15    / Base Excess: -9.6  /  SaO2: 98      Lactate: x      [ ] Extubation Readiness Assessed  Meds:       CARDIOVASCULAR  HR: 96 (01-31-20 @ 23:00) (96 - 109)  BP: 99/70 (01-31-20 @ 19:00) (89/54 - 127/71)  BP(mean): 80 (01-31-20 @ 19:00) (64 - 92)  ABP: 101/56 (01-31-20 @ 23:00) (77/67 - 131/67)  ABP(mean): 74 (01-31-20 @ 23:00) (65 - 91)  Wt(kg): --  CVP(cm H2O): --  VBG - ( 30 Jan 2020 16:32 )  pH: x     /  pCO2: x     /  pO2: x     / HCO3: x     / Base Excess: x     /  SaO2: x      Lactate: 2.1      Exam:  Cardiac Rhythm:  Perfusion     [ x]Adequate   [ ]Inadequate  Mentation   [x ]Normal       [ ]Reduced  Extremities  [ x]Warm         [ ]Cool  Volume Status [ ]Hypervolemic [x ]Euvolemic [ ]Hypovolemic  Meds:       GI/NUTRITION  Exam: Soft, NT, ND. Midline incision with wound-VAC in place. LLQ ostomy with device in place.  Diet: NPO  Meds: pantoprazole  Injectable 40 milliGRAM(s) IV Push daily      GENITOURINARY  I&O's Detail    01-30 @ 07:01  -  01-31 @ 07:00  --------------------------------------------------------  IN:    IV PiggyBack: 250 mL    Lactated Ringers IV Bolus: 1500 mL    lactated ringers.: 400 mL    propofol Infusion: 49 mL    Solution: 50 mL  Total IN: 2249 mL    OUT:    Indwelling Catheter - Urethral: 85 mL  Total OUT: 85 mL    Total NET: 2164 mL      01-31 @ 07:01  -  02-01 @ 00:21  --------------------------------------------------------  IN:    dextrose 5% + sodium chloride 0.45%: 1050 mL    IV PiggyBack: 250 mL    lactated ringers.: 200 mL    Solution: 150 mL  Total IN: 1650 mL    OUT:    Indwelling Catheter - Urethral: 185 mL  Total OUT: 185 mL    Total NET: 1465 mL        Weight (kg): 65.9 (01-31 @ 01:10)  01-31    133<L>  |  103  |  42<H>  ----------------------------<  115<H>  3.5   |  13<L>  |  2.03<H>    Ca    8.5      31 Jan 2020 02:11  Phos  4.6     01-31  Mg     1.8     01-31    TPro  6.2  /  Alb  3.0<L>  /  TBili  1.3<H>  /  DBili  x   /  AST  28  /  ALT  24  /  AlkPhos  129<H>  01-30    [ ] Akins catheter, indication: N/A  Meds: dextrose 5% + sodium chloride 0.45% 1000 milliLiter(s) IV Continuous <Continuous>        HEMATOLOGIC  Meds: enoxaparin Injectable 30 milliGRAM(s) SubCutaneous daily    [x] VTE Prophylaxis                        11.5   4.69  )-----------( 254      ( 31 Jan 2020 02:11 )             36.7     PT/INR - ( 31 Jan 2020 02:13 )   PT: 16.7 sec;   INR: 1.44 ratio         PTT - ( 31 Jan 2020 02:13 )  PTT:29.7 sec  Transfusion     [ ] PRBC   [ ] Platelets   [ ] FFP   [ ] Cryoprecipitate      INFECTIOUS DISEASES  T(C): 36.9 (01-31-20 @ 23:00), Max: 36.9 (01-31-20 @ 23:00)  Wt(kg): --  WBC Count: 4.69 K/uL (01-31 @ 02:11)    Recent Cultures:  Specimen Source: .Body Fluid Peritoneal Fluid, 01-31 @ 09:20; Results --; Gram Stain:   Numerous polymorphonuclear leukocytes per low power field  Numerous Gram positive cocci in pairs per oil power field  Few Gram Positive Rods per oil power field  Rare Gram Negative Rods per oil power field; Organism: --  Specimen Source: .Blood Blood-Peripheral, 01-30 @ 18:54; Results   No growth to date.; Gram Stain: --; Organism: --    Meds: piperacillin/tazobactam IVPB.. 3.375 Gram(s) IV Intermittent every 8 hours        ENDOCRINE  Capillary Blood Glucose    Meds:       ACCESS DEVICES:  [x] Peripheral IV  [ ] Central Venous Line	[ ] R	[ ] L	[ ] IJ	[ ] Fem	[ ] SC	Placed:   [x] Arterial Line		[ ] R	[x] L	[ ] Fem	[x] Rad	[ ] Ax	Placed:   [ ] PICC:					[ ] Mediport  [x] Urinary Catheter, Date Placed: 1/31/2020  [ ] Necessity of urinary, arterial, and venous catheters discussed      OTHER MEDICATIONS:  chlorhexidine 2% Cloths 1 Application(s) Topical daily      CODE STATUS:     IMAGING: HISTORY  Betty Rowe is a 74 year old F with no PMHx, PSHx of R TKR 15 years ago and left TKR this past Friday at [place] recently admitted after presenting with 4 days of abdominal pain not associated with nausea, vomiting, fevers or chills. The patient had worsening abdominal pain and distension that failed to improved over the course of the week prompting her to go see her PMD. A CT abdomen pelvis was obtained at an outside facility which revealed findings concerning for free air. The patient was advised to present to the ED. Upon arrival, she was found to be hypotensive to the 90's, and tachycardic to the low 100's. The patient was resuscitated and started on antibiotics due to concerns for a colonic perforation and was taken to the operating room emergently. In the OR, the patient was found to have purulent ascites with several areas of organizing abscess cavities. She was also found to have a severely thickened sigmoid colon with surrounding inflammatory changes suggestive of perforated diverticulitis. A Luisana's procedure was performed and the patient was transferred to the SICU for post-operative hemodynamic monitoring and further resuscitation.     In the OR, the patient received 2 units pRBC, 1.5 L of IV fluids,  cc,  cc.     24 HOUR EVENTS:  - Extubated  - Fent switched to dilaudid  - Started D5 1/2 NS + 75 bicarb  - DVT PPx w/ Love 30  - Removed L TKA dressing  - Ostomy consulted  - Artem PT for wound vac change 3x/wk starting Monday 2/3/2020  - Will resume Eliquis when trauma clears  - overnight, delirious, w/ acute change in abd exam, epigastric tenderness    SUBJECTIVE/ROS:  [x ] A ten-point review of systems was otherwise negative except as noted.  [ ] Due to altered mental status/intubation, subjective information were not able to be obtained from the patient. History was obtained, to the extent possible, from review of the chart and collateral sources of information.      NEURO  Exam: AOx3  Meds: HYDROmorphone  Injectable 0.5 milliGRAM(s) IV Push every 3 hours PRN Severe Pain (7 - 10)    [x] Adequacy of sedation and pain control has been assessed and adjusted      RESPIRATORY  RR: 24 (01-31-20 @ 23:00) (13 - 35)  SpO2: 96% (01-31-20 @ 23:00) (94% - 100%)  Wt(kg): --  Exam: unlabored, clear to auscultation bilaterally  Mechanical Ventilation: Mode: off, RR (patient): 18, FiO2: 40  ABG - ( 31 Jan 2020 08:54 )  pH: 7.34  /  pCO2: 28    /  pO2: 117   / HCO3: 15    / Base Excess: -9.6  /  SaO2: 98      Lactate: x      [ ] Extubation Readiness Assessed  Meds:       CARDIOVASCULAR  HR: 96 (01-31-20 @ 23:00) (96 - 109)  BP: 99/70 (01-31-20 @ 19:00) (89/54 - 127/71)  BP(mean): 80 (01-31-20 @ 19:00) (64 - 92)  ABP: 101/56 (01-31-20 @ 23:00) (77/67 - 131/67)  ABP(mean): 74 (01-31-20 @ 23:00) (65 - 91)  Wt(kg): --  CVP(cm H2O): --  VBG - ( 30 Jan 2020 16:32 )  pH: x     /  pCO2: x     /  pO2: x     / HCO3: x     / Base Excess: x     /  SaO2: x      Lactate: 2.1      Exam:  Cardiac Rhythm:  Perfusion     [ x]Adequate   [ ]Inadequate  Mentation   [x ]Normal       [ ]Reduced  Extremities  [ x]Warm         [ ]Cool  Volume Status [ ]Hypervolemic [x ]Euvolemic [ ]Hypovolemic  Meds:       GI/NUTRITION  Exam: Soft, NT, ND. Midline incision with wound-VAC in place. LLQ ostomy with device in place.  Diet: NPO  Meds: pantoprazole  Injectable 40 milliGRAM(s) IV Push daily      GENITOURINARY  I&O's Detail    01-30 @ 07:01  -  01-31 @ 07:00  --------------------------------------------------------  IN:    IV PiggyBack: 250 mL    Lactated Ringers IV Bolus: 1500 mL    lactated ringers.: 400 mL    propofol Infusion: 49 mL    Solution: 50 mL  Total IN: 2249 mL    OUT:    Indwelling Catheter - Urethral: 85 mL  Total OUT: 85 mL    Total NET: 2164 mL      01-31 @ 07:01  -  02-01 @ 00:21  --------------------------------------------------------  IN:    dextrose 5% + sodium chloride 0.45%: 1050 mL    IV PiggyBack: 250 mL    lactated ringers.: 200 mL    Solution: 150 mL  Total IN: 1650 mL    OUT:    Indwelling Catheter - Urethral: 185 mL  Total OUT: 185 mL    Total NET: 1465 mL        Weight (kg): 65.9 (01-31 @ 01:10)  01-31    133<L>  |  103  |  42<H>  ----------------------------<  115<H>  3.5   |  13<L>  |  2.03<H>    Ca    8.5      31 Jan 2020 02:11  Phos  4.6     01-31  Mg     1.8     01-31    TPro  6.2  /  Alb  3.0<L>  /  TBili  1.3<H>  /  DBili  x   /  AST  28  /  ALT  24  /  AlkPhos  129<H>  01-30    [ ] Akins catheter, indication: N/A  Meds: dextrose 5% + sodium chloride 0.45% 1000 milliLiter(s) IV Continuous <Continuous>        HEMATOLOGIC  Meds: enoxaparin Injectable 30 milliGRAM(s) SubCutaneous daily    [x] VTE Prophylaxis                        11.5   4.69  )-----------( 254      ( 31 Jan 2020 02:11 )             36.7     PT/INR - ( 31 Jan 2020 02:13 )   PT: 16.7 sec;   INR: 1.44 ratio         PTT - ( 31 Jan 2020 02:13 )  PTT:29.7 sec  Transfusion     [ ] PRBC   [ ] Platelets   [ ] FFP   [ ] Cryoprecipitate      INFECTIOUS DISEASES  T(C): 36.9 (01-31-20 @ 23:00), Max: 36.9 (01-31-20 @ 23:00)  Wt(kg): --  WBC Count: 4.69 K/uL (01-31 @ 02:11)    Recent Cultures:  Specimen Source: .Body Fluid Peritoneal Fluid, 01-31 @ 09:20; Results --; Gram Stain:   Numerous polymorphonuclear leukocytes per low power field  Numerous Gram positive cocci in pairs per oil power field  Few Gram Positive Rods per oil power field  Rare Gram Negative Rods per oil power field; Organism: --  Specimen Source: .Blood Blood-Peripheral, 01-30 @ 18:54; Results   No growth to date.; Gram Stain: --; Organism: --    Meds: piperacillin/tazobactam IVPB.. 3.375 Gram(s) IV Intermittent every 8 hours        ENDOCRINE  Capillary Blood Glucose    Meds:       ACCESS DEVICES:  [x] Peripheral IV  [ ] Central Venous Line	[ ] R	[ ] L	[ ] IJ	[ ] Fem	[ ] SC	Placed:   [x] Arterial Line		[ ] R	[x] L	[ ] Fem	[x] Rad	[ ] Ax	Placed:   [ ] PICC:					[ ] Mediport  [x] Urinary Catheter, Date Placed: 1/31/2020  [ ] Necessity of urinary, arterial, and venous catheters discussed      OTHER MEDICATIONS:  chlorhexidine 2% Cloths 1 Application(s) Topical daily      CODE STATUS:     IMAGING:

## 2020-02-01 NOTE — PROGRESS NOTE ADULT - ASSESSMENT
74F with no PMHx and PSHx of R TKR 15 years ago and left TKR this past Friday (1/24) at [place] presenting with 4 days of abdominal pain and distension in the setting of constipation and a history of diverticulosis. Found to have significant pneumoperitoneum on CTAP concerning for perforated bowel, although the source of the perforation remains unclear, it is likely from diverticuli. Now s/p exploratory laparotomy, Salas procedure and left oophorectomy (1/30).     - Zosyn for intra-abdominal infection   - IVF   - NPO   - Monitor ostomy function  - Wound VAC change  - continue excellent care per SICU      [id number]  ACS

## 2020-02-01 NOTE — PROGRESS NOTE ADULT - ASSESSMENT
Betty Rowe is a 74 year old F who is s/p Salas's for a perforated diverticulitis. Hospital course complicated by sepsis, interval requirement of pressors in the OR. Transferred in stable condition to the SICU for further resuscitation and hemodynamic monitoring.     PLAN BY SYSTEMS  Neurological:    -- Dilauded prn for pain control      Pulmonary:  - Extubated    Cardiovascular:  -- Patient is off of all pressor support.    Renal:  -- D5 1/2NS w/ 75Sodium Bicarb at 100 cc/hr   -- Akins catheter in place, strict I's & O's  -- Replete electrolytes PRN    Hematological:  -- Love 30 for DVT PPx    Infectious Disease:  -- Antibiotics: Zosyn x4 days  -- Follow-up cultures and continue antibiotics.    GI:  -- NPO  -- Protonix    Endocrine:  -- No active issues    Musculoskeletal:  -- Will keep L knee incision open to air     FULL CODE, discussed.

## 2020-02-01 NOTE — PROVIDER CONTACT NOTE (OTHER) - ASSESSMENT
Pt. a/ox4, but hallucinating at times. Pt. received one time dose of dilaudid at 1956. Physical assessment by MD at bedside

## 2020-02-01 NOTE — PROGRESS NOTE ADULT - SUBJECTIVE AND OBJECTIVE BOX
SURGERY DAILY PROGRESS NOTE:       SUBJECTIVE/ROS: Patient seen and evaluated at the bedside on AM rounds. Extubated yesterday, no gas our output from ostomy yet. C/O some epigastric pain.     OBJECTIVE:              PHYSICAL EXAM:  General: NAD  HEENT: NC/AT  Pulmonary: Breathing comfortably on RA  Cardiovascular: NSR, no murmurs  Abdominal: soft, non distended, some epigastric tenderness, ostomy pink with no gas and bowel sweat in the ostomy bag, midline wound vac in place with good suction  Extremities: Left knee dressing in place c/d/i SURGERY DAILY PROGRESS NOTE:       SUBJECTIVE/ROS: Patient seen and evaluated at the bedside on AM rounds. Extubated yesterday, no gas our output from ostomy yet. C/O some epigastric pain.     OBJECTIVE:            Vital Signs:  Vital Signs Last 24 Hrs  T(C): 36.4 (01 Feb 2020 08:00), Max: 36.9 (31 Jan 2020 23:00)  T(F): 97.5 (01 Feb 2020 08:00), Max: 98.4 (31 Jan 2020 23:00)  HR: 88 (01 Feb 2020 08:00) (88 - 104)  BP: 108/55 (01 Feb 2020 08:00) (96/62 - 114/73)  BP(mean): 75 (01 Feb 2020 08:00) (74 - 88)  RR: 15 (01 Feb 2020 08:00) (13 - 35)  SpO2: 96% (01 Feb 2020 08:00) (94% - 98%)    CAPILLARY BLOOD GLUCOSE          I&O's Detail    31 Jan 2020 07:01  -  01 Feb 2020 07:00  --------------------------------------------------------  IN:    dextrose 5% + sodium chloride 0.45%: 1650 mL    IV PiggyBack: 450 mL    lactated ringers.: 200 mL    Solution: 275 mL  Total IN: 2575 mL    OUT:    Indwelling Catheter - Urethral: 470 mL  Total OUT: 470 mL    Total NET: 2105 mL      01 Feb 2020 07:01  -  01 Feb 2020 08:52  --------------------------------------------------------  IN:    dextrose 5% + sodium chloride 0.45%: 75 mL    Solution: 25 mL  Total IN: 100 mL    OUT:  Total OUT: 0 mL    Total NET: 100 mL          MEDICATIONS  (STANDING):  chlorhexidine 2% Cloths 1 Application(s) Topical daily  dextrose 5% + sodium chloride 0.45% 1000 milliLiter(s) (100 mL/Hr) IV Continuous <Continuous>  enoxaparin Injectable 30 milliGRAM(s) SubCutaneous daily  pantoprazole  Injectable 40 milliGRAM(s) IV Push daily  piperacillin/tazobactam IVPB.. 3.375 Gram(s) IV Intermittent every 8 hours    MEDICATIONS  (PRN):  HYDROmorphone  Injectable 0.5 milliGRAM(s) IV Push every 3 hours PRN Severe Pain (7 - 10)          Labs:    02-01    135  |  102  |  50<H>  ----------------------------<  127<H>  4.1   |  17<L>  |  2.55<H>    Ca    8.6      01 Feb 2020 02:16  Phos  4.2     02-01  Mg     2.5     02-01    TPro  6.2  /  Alb  3.0<L>  /  TBili  1.3<H>  /  DBili  x   /  AST  28  /  ALT  24  /  AlkPhos  129<H>  01-30    LIVER FUNCTIONS - ( 30 Jan 2020 16:32 )  Alb: 3.0 g/dL / Pro: 6.2 g/dL / ALK PHOS: 129 U/L / ALT: 24 U/L / AST: 28 U/L / GGT: x                                 9.6    9.85  )-----------( 210      ( 01 Feb 2020 02:16 )             28.6     PT/INR - ( 01 Feb 2020 02:10 )   PT: 12.9 sec;   INR: 1.13 ratio         PTT - ( 01 Feb 2020 02:10 )  PTT:26.8 sec    Imaging:                    PHYSICAL EXAM:  General: NAD  HEENT: NC/AT  Pulmonary: Breathing comfortably on RA  Cardiovascular: NSR, no murmurs  Abdominal: soft, non distended, some epigastric tenderness, ostomy pink with no gas and bowel sweat in the ostomy bag, midline wound vac in place with good suction  Extremities: Left knee dressing in place c/d/i

## 2020-02-02 LAB
-  AMIKACIN: SIGNIFICANT CHANGE UP
-  AMOXICILLIN/CLAVULANIC ACID: SIGNIFICANT CHANGE UP
-  AMPICILLIN/SULBACTAM: SIGNIFICANT CHANGE UP
-  AMPICILLIN: SIGNIFICANT CHANGE UP
-  AZTREONAM: SIGNIFICANT CHANGE UP
-  CEFAZOLIN: SIGNIFICANT CHANGE UP
-  CEFEPIME: SIGNIFICANT CHANGE UP
-  CEFOXITIN: SIGNIFICANT CHANGE UP
-  CEFTAZIDIME/AVIBACTAM: SIGNIFICANT CHANGE UP
-  CEFTOLOZANE/TAZOBACTAM: SIGNIFICANT CHANGE UP
-  CEFTRIAXONE: SIGNIFICANT CHANGE UP
-  CIPROFLOXACIN: SIGNIFICANT CHANGE UP
-  ERTAPENEM: SIGNIFICANT CHANGE UP
-  GENTAMICIN: SIGNIFICANT CHANGE UP
-  IMIPENEM: SIGNIFICANT CHANGE UP
-  LEVOFLOXACIN: SIGNIFICANT CHANGE UP
-  MEROPENEM: SIGNIFICANT CHANGE UP
-  PIPERACILLIN/TAZOBACTAM: SIGNIFICANT CHANGE UP
-  TOBRAMYCIN: SIGNIFICANT CHANGE UP
-  TRIMETHOPRIM/SULFAMETHOXAZOLE: SIGNIFICANT CHANGE UP
ANION GAP SERPL CALC-SCNC: 13 MMOL/L — SIGNIFICANT CHANGE UP (ref 5–17)
BUN SERPL-MCNC: 43 MG/DL — HIGH (ref 7–23)
CALCIUM SERPL-MCNC: 8.6 MG/DL — SIGNIFICANT CHANGE UP (ref 8.4–10.5)
CHLORIDE SERPL-SCNC: 102 MMOL/L — SIGNIFICANT CHANGE UP (ref 96–108)
CO2 SERPL-SCNC: 19 MMOL/L — LOW (ref 22–31)
CREAT SERPL-MCNC: 1.8 MG/DL — HIGH (ref 0.5–1.3)
GLUCOSE SERPL-MCNC: 110 MG/DL — HIGH (ref 70–99)
HCT VFR BLD CALC: 25.7 % — LOW (ref 34.5–45)
HGB BLD-MCNC: 8.7 G/DL — LOW (ref 11.5–15.5)
MAGNESIUM SERPL-MCNC: 2.2 MG/DL — SIGNIFICANT CHANGE UP (ref 1.6–2.6)
MCHC RBC-ENTMCNC: 29.3 PG — SIGNIFICANT CHANGE UP (ref 27–34)
MCHC RBC-ENTMCNC: 33.9 GM/DL — SIGNIFICANT CHANGE UP (ref 32–36)
MCV RBC AUTO: 86.5 FL — SIGNIFICANT CHANGE UP (ref 80–100)
METHOD TYPE: SIGNIFICANT CHANGE UP
NRBC # BLD: 0 /100 WBCS — SIGNIFICANT CHANGE UP (ref 0–0)
PHOSPHATE SERPL-MCNC: 3.6 MG/DL — SIGNIFICANT CHANGE UP (ref 2.5–4.5)
PLATELET # BLD AUTO: 195 K/UL — SIGNIFICANT CHANGE UP (ref 150–400)
POTASSIUM SERPL-MCNC: 3.6 MMOL/L — SIGNIFICANT CHANGE UP (ref 3.5–5.3)
POTASSIUM SERPL-SCNC: 3.6 MMOL/L — SIGNIFICANT CHANGE UP (ref 3.5–5.3)
RBC # BLD: 2.97 M/UL — LOW (ref 3.8–5.2)
RBC # FLD: 15.5 % — HIGH (ref 10.3–14.5)
SODIUM SERPL-SCNC: 134 MMOL/L — LOW (ref 135–145)
WBC # BLD: 14.06 K/UL — HIGH (ref 3.8–10.5)
WBC # FLD AUTO: 14.06 K/UL — HIGH (ref 3.8–10.5)

## 2020-02-02 PROCEDURE — 71045 X-RAY EXAM CHEST 1 VIEW: CPT | Mod: 26

## 2020-02-02 PROCEDURE — 99233 SBSQ HOSP IP/OBS HIGH 50: CPT

## 2020-02-02 RX ORDER — SODIUM CHLORIDE 9 MG/ML
1000 INJECTION, SOLUTION INTRAVENOUS
Refills: 0 | Status: DISCONTINUED | OUTPATIENT
Start: 2020-02-02 | End: 2020-02-04

## 2020-02-02 RX ORDER — POTASSIUM CHLORIDE 20 MEQ
10 PACKET (EA) ORAL
Refills: 0 | Status: COMPLETED | OUTPATIENT
Start: 2020-02-02 | End: 2020-02-02

## 2020-02-02 RX ADMIN — Medication 100 MILLIEQUIVALENT(S): at 06:05

## 2020-02-02 RX ADMIN — Medication 1000 MILLIGRAM(S): at 05:49

## 2020-02-02 RX ADMIN — ENOXAPARIN SODIUM 30 MILLIGRAM(S): 100 INJECTION SUBCUTANEOUS at 11:26

## 2020-02-02 RX ADMIN — HYDROMORPHONE HYDROCHLORIDE 0.5 MILLIGRAM(S): 2 INJECTION INTRAMUSCULAR; INTRAVENOUS; SUBCUTANEOUS at 11:25

## 2020-02-02 RX ADMIN — Medication 100 MILLIEQUIVALENT(S): at 18:01

## 2020-02-02 RX ADMIN — Medication 100 MILLIEQUIVALENT(S): at 04:38

## 2020-02-02 RX ADMIN — PIPERACILLIN AND TAZOBACTAM 25 GRAM(S): 4; .5 INJECTION, POWDER, LYOPHILIZED, FOR SOLUTION INTRAVENOUS at 21:56

## 2020-02-02 RX ADMIN — Medication 100 MILLIEQUIVALENT(S): at 14:28

## 2020-02-02 RX ADMIN — Medication 100 MILLIEQUIVALENT(S): at 05:49

## 2020-02-02 RX ADMIN — PANTOPRAZOLE SODIUM 40 MILLIGRAM(S): 20 TABLET, DELAYED RELEASE ORAL at 11:26

## 2020-02-02 RX ADMIN — PIPERACILLIN AND TAZOBACTAM 25 GRAM(S): 4; .5 INJECTION, POWDER, LYOPHILIZED, FOR SOLUTION INTRAVENOUS at 13:20

## 2020-02-02 RX ADMIN — HYDROMORPHONE HYDROCHLORIDE 0.5 MILLIGRAM(S): 2 INJECTION INTRAMUSCULAR; INTRAVENOUS; SUBCUTANEOUS at 11:40

## 2020-02-02 RX ADMIN — Medication 400 MILLIGRAM(S): at 05:49

## 2020-02-02 RX ADMIN — PIPERACILLIN AND TAZOBACTAM 25 GRAM(S): 4; .5 INJECTION, POWDER, LYOPHILIZED, FOR SOLUTION INTRAVENOUS at 05:48

## 2020-02-02 RX ADMIN — BENZOCAINE AND MENTHOL 1 LOZENGE: 5; 1 LIQUID ORAL at 13:20

## 2020-02-02 RX ADMIN — SODIUM CHLORIDE 75 MILLILITER(S): 9 INJECTION, SOLUTION INTRAVENOUS at 11:26

## 2020-02-02 RX ADMIN — Medication 100 MILLIEQUIVALENT(S): at 16:17

## 2020-02-02 NOTE — PROGRESS NOTE ADULT - SUBJECTIVE AND OBJECTIVE BOX
HISTORY  74y Female no PMHx, PSHx of R TKR 15 years ago and left TKR this past Friday at [place] recently admitted after presenting with 4 days of abdominal pain not associated with nausea, vomiting, fevers or chills. The patient had worsening abdominal pain and distension that failed to improved over the course of the week prompting her to go see her PMD. A CT abdomen pelvis was obtained at an outside facility which revealed findings concerning for free air. The patient was advised to present to the ED. Upon arrival, she was found to be hypotensive to the 90's, and tachycardic to the low 100's. The patient was resuscitated and started on antibiotics due to concerns for a colonic perforation and was taken to the operating room emergently. In the OR, the patient was found to have purulent ascites with several areas of organizing abscess cavities. She was also found to have a severely thickened sigmoid colon with surrounding inflammatory changes suggestive of perforated diverticulitis. A Luisana's procedure was performed and the patient was transferred to the SICU for post-operative hemodynamic monitoring and further resuscitation.     In the OR, the patient received 2 units pRBC, 1.5 L of IV fluids,  cc,  cc.       24 HOUR EVENTS:  - 1 gram of vancomycin administered for low trough of 6.6.   - Creatinin improving     SUBJECTIVE/ROS:  [ ] A ten-point review of systems was otherwise negative except as noted.  [ ] Due to altered mental status/intubation, subjective information were not able to be obtained from the patient. History was obtained, to the extent possible, from review of the chart and collateral sources of information.      NEURO  Exam: awake, alert, oriented  Meds: acetaminophen  IVPB .. 1000 milliGRAM(s) IV Intermittent every 6 hours  HYDROmorphone  Injectable 0.5 milliGRAM(s) IV Push every 3 hours PRN Severe Pain (7 - 10)    [x] Adequacy of sedation and pain control has been assessed and adjusted      RESPIRATORY  RR: 22 (02-02-20 @ 00:00) (15 - 42)  SpO2: 96% (02-02-20 @ 00:00) (87% - 98%)  Exam: unlabored, clear to auscultation bilaterally  Mechanical Ventilation: none  ABG - ( 31 Jan 2020 08:54 )  pH: 7.34  /  pCO2: 28    /  pO2: 117   / HCO3: 15    / Base Excess: -9.6  /  SaO2: 98      Lactate: x      [N/A] Extubation Readiness Assessed  Meds: none       CARDIOVASCULAR  HR: 77 (02-02-20 @ 00:00) (74 - 97)  BP: 113/56 (02-02-20 @ 00:00) (100/56 - 128/60)  BP(mean): 81 (02-02-20 @ 00:00) (75 - 88)  ABP: 131/62 (02-01-20 @ 05:00) (108/63 - 131/62)  ABP(mean): 85 (02-01-20 @ 05:00) (78 - 85)  Exam: regular rate and rhythm  Cardiac Rhythm: sinus  Perfusion     [x]Adequate   [ ]Inadequate  Mentation   [x]Normal       [ ]Reduced  Extremities  [x]Warm         [ ]Cool  Volume Status [ ]Hypervolemic [x]Euvolemic [ ]Hypovolemic  Meds: none       GI/NUTRITION  Exam: soft, nontender, nondistended  Diet: NPO  Meds: pantoprazole  Injectable 40 milliGRAM(s) IV Push daily      GENITOURINARY  I&O's Detail    01-31 @ 07:01  -  02-01 @ 07:00  --------------------------------------------------------  IN:    dextrose 5% + sodium chloride 0.45%: 1650 mL    IV PiggyBack: 450 mL    lactated ringers.: 200 mL    Solution: 275 mL  Total IN: 2575 mL    OUT:    Indwelling Catheter - Urethral: 470 mL  Total OUT: 470 mL    Total NET: 2105 mL      02-01 @ 07:01  -  02-02 @ 02:23  --------------------------------------------------------  IN:    dextrose 5% + lactated ringers.: 1125 mL    dextrose 5% + sodium chloride 0.45%: 150 mL    IV PiggyBack: 475 mL    Solution: 150 mL  Total IN: 1900 mL    OUT:    Indwelling Catheter - Urethral: 665 mL  Total OUT: 665 mL    Total NET: 1235 mL          02-01    133<L>  |  104  |  49<H>  ----------------------------<  119<H>  3.7   |  16<L>  |  2.00<H>    Ca    8.4      01 Feb 2020 23:28  Phos  4.0     02-01  Mg     2.4     02-01      [x] Akins catheter, indication: urine output monitoring in critically ill   Meds: dextrose 5% + lactated ringers. 1000 milliLiter(s) IV Continuous <Continuous>        HEMATOLOGIC  Meds: enoxaparin Injectable 30 milliGRAM(s) SubCutaneous daily    [x] VTE Prophylaxis                        8.4    13.06 )-----------( 190      ( 01 Feb 2020 23:28 )             24.9     PT/INR - ( 01 Feb 2020 02:10 )   PT: 12.9 sec;   INR: 1.13 ratio         PTT - ( 01 Feb 2020 02:10 )  PTT:26.8 sec  Transfusion     [ ] PRBC   [ ] Platelets   [ ] FFP   [ ] Cryoprecipitate      INFECTIOUS DISEASES  WBC Count: 13.06 K/uL (02-01 @ 23:28)  WBC Count: 12.32 K/uL (02-01 @ 14:00)    RECENT CULTURES:  Specimen Source: .Body Fluid Peritoneal Fluid  Date/Time: 01-31 @ 09:20  Culture Results:   Few Morganella morganii  Few Enterococcus faecalis  Few Streptococcus anginosus "Susceptibilities not performed"  Gram Stain:   Numerous polymorphonuclear leukocytes per low power field  Numerous Gram positive cocci in pairs per oil power field  Few Gram Positive Rods per oil power field  Rare Gram Negative Rods per oil power field  Organism: --  Specimen Source: .Blood Blood-Peripheral  Date/Time: 01-30 @ 18:54  Culture Results:   No growth to date.  Gram Stain: --  Organism: --    Meds: piperacillin/tazobactam IVPB.. 3.375 Gram(s) IV Intermittent every 8 hours        ENDOCRINE  CAPILLARY BLOOD GLUCOSE        Meds: none      ACCESS DEVICES:  [x] Peripheral IV  [ ] Central Venous Line	[ ] R	[ ] L	[ ] IJ	[ ] Fem	[ ] SC	Placed:   [ ] Arterial Line		[ ] R	[ ] L	[ ] Fem	[ ] Rad	[ ] Ax	Placed:   [ ] PICC:					[ ] Mediport  [x] Urinary Catheter, Date Placed:   [x] Necessity of urinary, arterial, and venous catheters discussed    OTHER MEDICATIONS:  benzocaine 15 mG/menthol 3.6 mG (Sugar-Free) Lozenge 1 Lozenge Oral every 4 hours PRN  chlorhexidine 2% Cloths 1 Application(s) Topical daily      CODE STATUS: full code        IMAGING: none

## 2020-02-02 NOTE — PROGRESS NOTE ADULT - SUBJECTIVE AND OBJECTIVE BOX
SURGERY DAILY PROGRESS NOTE:       SUBJECTIVE/ROS: Patient seen and evaluated at the bedside on AM rounds., No gas our output from ostomy yet. C/O some epigastric pain.     OBJECTIVE:    Vital Signs:  Vital Signs Last 24 Hrs  T(C): 36.8 (02 Feb 2020 03:00), Max: 36.8 (02 Feb 2020 03:00)  T(F): 98.2 (02 Feb 2020 03:00), Max: 98.2 (02 Feb 2020 03:00)  HR: 73 (02 Feb 2020 07:00) (73 - 93)  BP: 123/60 (02 Feb 2020 07:00) (100/56 - 128/60)  BP(mean): 87 (02 Feb 2020 07:00) (75 - 87)  RR: 23 (02 Feb 2020 07:00) (15 - 42)  SpO2: 98% (02 Feb 2020 07:00) (87% - 98%)    CAPILLARY BLOOD GLUCOSE      PHYSICAL EXAM:  General: NAD  HEENT: NC/AT  Pulmonary: Breathing comfortably on RA  Cardiovascular: NSR, no murmurs  Abdominal: soft, non distended, some epigastric tenderness, ostomy pink with no gas and bowel sweat in the ostomy bag, midline wound vac in place with good suction  Extremities: Left knee dressing in place c/d/i          I&O's Detail    01 Feb 2020 07:01  -  02 Feb 2020 07:00  --------------------------------------------------------  IN:    dextrose 5% + lactated ringers.: 1650 mL    dextrose 5% + sodium chloride 0.45%: 150 mL    IV PiggyBack: 675 mL    Solution: 450 mL  Total IN: 2925 mL    OUT:    Indwelling Catheter - Urethral: 1055 mL  Total OUT: 1055 mL    Total NET: 1870 mL          MEDICATIONS  (STANDING):  chlorhexidine 2% Cloths 1 Application(s) Topical daily  dextrose 5% + lactated ringers. 1000 milliLiter(s) (75 mL/Hr) IV Continuous <Continuous>  enoxaparin Injectable 30 milliGRAM(s) SubCutaneous daily  pantoprazole  Injectable 40 milliGRAM(s) IV Push daily  piperacillin/tazobactam IVPB.. 3.375 Gram(s) IV Intermittent every 8 hours    MEDICATIONS  (PRN):  benzocaine 15 mG/menthol 3.6 mG (Sugar-Free) Lozenge 1 Lozenge Oral every 4 hours PRN Sore Throat  HYDROmorphone  Injectable 0.5 milliGRAM(s) IV Push every 3 hours PRN Severe Pain (7 - 10)          Labs:    02-01    133<L>  |  104  |  49<H>  ----------------------------<  119<H>  3.7   |  16<L>  |  2.00<H>    Ca    8.4      01 Feb 2020 23:28  Phos  4.0     02-01  Mg     2.4     02-01                              8.4    13.06 )-----------( 190      ( 01 Feb 2020 23:28 )             24.9     PT/INR - ( 01 Feb 2020 02:10 )   PT: 12.9 sec;   INR: 1.13 ratio         PTT - ( 01 Feb 2020 02:10 )  PTT:26.8 sec    Imaging:

## 2020-02-02 NOTE — PROGRESS NOTE ADULT - ASSESSMENT
Betty Rowe is a 74 year old F who is s/p Sen's for a perforated diverticulitis. Hospital course complicated by sepsis, interval requirement of pressors in the OR. Transferred in stable condition to the SICU for further resuscitation and hemodynamic monitoring.     PLAN BY SYSTEMS  Neurological:    - Dilaudid and tylenol prn for pain control      Pulmonary:  - No active issues    Cardiovascular: No active issues  -- Patient is off of all pressor support.    GI: S/p sen's for perforated diverdticulitis  - NPO; monitor for bowel function   - Continue protonix     Renal:  -- D5/LR @ 75ml/hr   -- Akins catheter in place, strict I's & O's  -- Replete electrolytes PRN    Hematological:  - Continue renally dosed Lovenox for VTE prophylaxis     Infectious Disease:  -- Cultures: Ordered, and pending.  -- Antibiotics: Zosyn x4 days  -- PLAN: Follow-up cultures and continue antibiotics.  - F/u vancomycin trough       Endocrine:  -- No active issues    Musculoskeletal:  -- PLAN: S/p left knee replacement. Prevena vac in place over left knee.      Dispo: SICU full code    - Sukumar Peraza PA-C Betty Rowe is a 74 year old F who is s/p Sen's for a perforated diverticulitis. Hospital course complicated by sepsis, interval requirement of pressors in the OR. Transferred in stable condition to the SICU for further resuscitation and hemodynamic monitoring.     PLAN BY SYSTEMS  Neurological:    - Dilaudid and tylenol prn for pain control      Pulmonary:  - No active issues    Cardiovascular: No active issues  -- Patient is off of all pressor support.    GI: S/p sen's for perforated diverdticulitis  - NPO; monitor for bowel function   - Continue protonix     Renal:  -- D5/LR @ 75ml/hr   -- Akins catheter in place, strict I's & O's  -- Replete electrolytes PRN    Hematological:  - Continue renally dosed Lovenox for VTE prophylaxis     Infectious Disease:  -- Cultures: Ordered, and pending.  -- Antibiotics: Zosyn x4 days  -- PLAN: Follow-up cultures and continue antibiotics.  - F/u vancomycin trough       Endocrine:  -- No active issues    Musculoskeletal:  -- PLAN: S/p left knee replacement. Prevena vac removed. Ambulating.     Dispo: SICU full code    - Sukumar Peraza PA-C

## 2020-02-02 NOTE — PROGRESS NOTE ADULT - ASSESSMENT
74F with no PMHx and PSHx of R TKR 15 years ago and left TKR this past Friday (1/24) at [place] presenting with 4 days of abdominal pain and distension in the setting of constipation and a history of diverticulosis. Found to have significant pneumoperitoneum on CTAP concerning for perforated bowel, although the source of the perforation remains unclear, it is likely from diverticuli. Now s/p exploratory laparotomy, Salas procedure and left oophorectomy (1/30).     - Zosyn for intra-abdominal infection   - IVF   - NPO   - Monitor ostomy function  - Wound VAC change monday  - continue excellent care per SICU      [id number]  ACS

## 2020-02-03 LAB
-  AMPICILLIN: SIGNIFICANT CHANGE UP
-  TETRACYCLINE: SIGNIFICANT CHANGE UP
-  VANCOMYCIN: SIGNIFICANT CHANGE UP
ANION GAP SERPL CALC-SCNC: 15 MMOL/L — SIGNIFICANT CHANGE UP (ref 5–17)
BUN SERPL-MCNC: 40 MG/DL — HIGH (ref 7–23)
CALCIUM SERPL-MCNC: 8.6 MG/DL — SIGNIFICANT CHANGE UP (ref 8.4–10.5)
CHLORIDE SERPL-SCNC: 103 MMOL/L — SIGNIFICANT CHANGE UP (ref 96–108)
CO2 SERPL-SCNC: 18 MMOL/L — LOW (ref 22–31)
CREAT SERPL-MCNC: 1.58 MG/DL — HIGH (ref 0.5–1.3)
GLUCOSE SERPL-MCNC: 107 MG/DL — HIGH (ref 70–99)
HCT VFR BLD CALC: 27 % — LOW (ref 34.5–45)
HGB BLD-MCNC: 8.8 G/DL — LOW (ref 11.5–15.5)
MAGNESIUM SERPL-MCNC: 2.1 MG/DL — SIGNIFICANT CHANGE UP (ref 1.6–2.6)
MCHC RBC-ENTMCNC: 28.6 PG — SIGNIFICANT CHANGE UP (ref 27–34)
MCHC RBC-ENTMCNC: 32.6 GM/DL — SIGNIFICANT CHANGE UP (ref 32–36)
MCV RBC AUTO: 87.7 FL — SIGNIFICANT CHANGE UP (ref 80–100)
METHOD TYPE: SIGNIFICANT CHANGE UP
NRBC # BLD: 0 /100 WBCS — SIGNIFICANT CHANGE UP (ref 0–0)
PHOSPHATE SERPL-MCNC: 3.4 MG/DL — SIGNIFICANT CHANGE UP (ref 2.5–4.5)
PLATELET # BLD AUTO: 201 K/UL — SIGNIFICANT CHANGE UP (ref 150–400)
POTASSIUM SERPL-MCNC: 4 MMOL/L — SIGNIFICANT CHANGE UP (ref 3.5–5.3)
POTASSIUM SERPL-SCNC: 4 MMOL/L — SIGNIFICANT CHANGE UP (ref 3.5–5.3)
RBC # BLD: 3.08 M/UL — LOW (ref 3.8–5.2)
RBC # FLD: 15.7 % — HIGH (ref 10.3–14.5)
SODIUM SERPL-SCNC: 136 MMOL/L — SIGNIFICANT CHANGE UP (ref 135–145)
WBC # BLD: 12.34 K/UL — HIGH (ref 3.8–10.5)
WBC # FLD AUTO: 12.34 K/UL — HIGH (ref 3.8–10.5)

## 2020-02-03 PROCEDURE — 99291 CRITICAL CARE FIRST HOUR: CPT

## 2020-02-03 PROCEDURE — 71045 X-RAY EXAM CHEST 1 VIEW: CPT | Mod: 26

## 2020-02-03 PROCEDURE — 73560 X-RAY EXAM OF KNEE 1 OR 2: CPT | Mod: 26,LT

## 2020-02-03 RX ORDER — SERTRALINE 25 MG/1
150 TABLET, FILM COATED ORAL DAILY
Refills: 0 | Status: DISCONTINUED | OUTPATIENT
Start: 2020-02-03 | End: 2020-02-11

## 2020-02-03 RX ORDER — ACETAMINOPHEN 500 MG
1000 TABLET ORAL EVERY 6 HOURS
Refills: 0 | Status: COMPLETED | OUTPATIENT
Start: 2020-02-03 | End: 2020-02-04

## 2020-02-03 RX ADMIN — PANTOPRAZOLE SODIUM 40 MILLIGRAM(S): 20 TABLET, DELAYED RELEASE ORAL at 12:12

## 2020-02-03 RX ADMIN — PIPERACILLIN AND TAZOBACTAM 25 GRAM(S): 4; .5 INJECTION, POWDER, LYOPHILIZED, FOR SOLUTION INTRAVENOUS at 14:30

## 2020-02-03 RX ADMIN — CHLORHEXIDINE GLUCONATE 1 APPLICATION(S): 213 SOLUTION TOPICAL at 07:03

## 2020-02-03 RX ADMIN — Medication 400 MILLIGRAM(S): at 12:58

## 2020-02-03 RX ADMIN — Medication 400 MILLIGRAM(S): at 18:30

## 2020-02-03 RX ADMIN — SODIUM CHLORIDE 75 MILLILITER(S): 9 INJECTION, SOLUTION INTRAVENOUS at 03:00

## 2020-02-03 RX ADMIN — Medication 1000 MILLIGRAM(S): at 19:02

## 2020-02-03 RX ADMIN — HYDROMORPHONE HYDROCHLORIDE 0.5 MILLIGRAM(S): 2 INJECTION INTRAMUSCULAR; INTRAVENOUS; SUBCUTANEOUS at 00:25

## 2020-02-03 RX ADMIN — ENOXAPARIN SODIUM 30 MILLIGRAM(S): 100 INJECTION SUBCUTANEOUS at 12:12

## 2020-02-03 RX ADMIN — PIPERACILLIN AND TAZOBACTAM 25 GRAM(S): 4; .5 INJECTION, POWDER, LYOPHILIZED, FOR SOLUTION INTRAVENOUS at 21:06

## 2020-02-03 RX ADMIN — SERTRALINE 150 MILLIGRAM(S): 25 TABLET, FILM COATED ORAL at 21:06

## 2020-02-03 RX ADMIN — SODIUM CHLORIDE 75 MILLILITER(S): 9 INJECTION, SOLUTION INTRAVENOUS at 18:00

## 2020-02-03 RX ADMIN — Medication 1000 MILLIGRAM(S): at 13:00

## 2020-02-03 RX ADMIN — PIPERACILLIN AND TAZOBACTAM 25 GRAM(S): 4; .5 INJECTION, POWDER, LYOPHILIZED, FOR SOLUTION INTRAVENOUS at 06:38

## 2020-02-03 RX ADMIN — HYDROMORPHONE HYDROCHLORIDE 0.5 MILLIGRAM(S): 2 INJECTION INTRAMUSCULAR; INTRAVENOUS; SUBCUTANEOUS at 00:10

## 2020-02-03 NOTE — DIETITIAN INITIAL EVALUATION ADULT. - PHYSICAL APPEARANCE
well nourished/other (specify)/ht: 5 feet 3 inches, admit wt: 145 pounds, BMI: 25.7 Kg/m2, IBW: 115 pounds (+/- 10%), 126% IBW Edema: 2+ right hand  Skin: no pressure injuries noted per nursing flowsheet  Nutrition Focused Physical Exam: well nourished/other (specify)/ht: 5 feet 3 inches, admit wt: 145 pounds, UBW: 130 pounds, Usual BMI: 23 Kg/m2, IBW: 115 pounds (+/- 10%), 113-126% IBW Edema: 2+ right hand  Skin: no pressure injuries noted per nursing flowsheet  Nutrition Focused Physical Exam: Pt noted with mild temporal depletion, otherwise appears well developed with no signs of muscle or fat depletion.

## 2020-02-03 NOTE — PROGRESS NOTE ADULT - ASSESSMENT
74F with no PMHx and PSHx of R TKR 15 years ago and left TKR this past Friday (1/24) at [place] presenting with 4 days of abdominal pain and distension in the setting of constipation and a history of diverticulosis. Found to have significant pneumoperitoneum on CTAP concerning for perforated bowel, although the source of the perforation remains unclear, it is likely from diverticuli. Now s/p exploratory laparotomy, Salas procedure and left oophorectomy (1/30).     - Zosyn for intra-abdominal infection   - IVF   - NPO   - Monitor ostomy function  - Wound VAC change   - continue excellent care per SICU  - Monitor Bicarb for acidosis       [id number]  ACS

## 2020-02-03 NOTE — DIETITIAN INITIAL EVALUATION ADULT. - PERTINENT LABORATORY DATA
02-03 @ 00:31: Sodium 136, Potassium 4.0, Chloride 103, Calcium 8.6, Magnesium 2.1, Phosphorus 3.4, BUN 40<H>, Creatinine 1.58<H>, <H>, Hemoglobin 8.8<L>, Hematocrit 27.0<L>

## 2020-02-03 NOTE — PROGRESS NOTE ADULT - SUBJECTIVE AND OBJECTIVE BOX
SURGERY DAILY PROGRESS NOTE:       SUBJECTIVE/ROS: Patient examined at bedside. Claims feels well. Is ambulating, has not yet had bowel function. CO2 18, now on bicarb  Denies nausea, vomiting, chest pain, shortness of breath         MEDICATIONS  (STANDING):  chlorhexidine 2% Cloths 1 Application(s) Topical daily  dextrose 5% + sodium chloride 0.45% 1000 milliLiter(s) (75 mL/Hr) IV Continuous <Continuous>  enoxaparin Injectable 30 milliGRAM(s) SubCutaneous daily  pantoprazole  Injectable 40 milliGRAM(s) IV Push daily  piperacillin/tazobactam IVPB.. 3.375 Gram(s) IV Intermittent every 8 hours    MEDICATIONS  (PRN):  benzocaine 15 mG/menthol 3.6 mG (Sugar-Free) Lozenge 1 Lozenge Oral every 4 hours PRN Sore Throat  HYDROmorphone  Injectable 0.5 milliGRAM(s) IV Push every 3 hours PRN Severe Pain (7 - 10)      OBJECTIVE:    Vital Signs Last 24 Hrs  T(C): 36.4 (2020 07:00), Max: 36.7 (2020 15:00)  T(F): 97.5 (2020 07:00), Max: 98 (2020 15:00)  HR: 79 (2020 09:00) (67 - 85)  BP: 143/66 (2020 09:00) (113/69 - 157/70)  BP(mean): 95 (2020 09:00) (82 - 104)  RR: 31 (2020 09:00) (16 - 44)  SpO2: 97% (2020 09:00) (91% - 98%)        I&O's Detail    2020 07:01  -  2020 07:00  --------------------------------------------------------  IN:    dextrose 5% + lactated ringers.: 225 mL    dextrose 5% + sodium chloride 0.45%: 1575 mL    IV PiggyBack: 300 mL    Solution: 300 mL  Total IN: 2400 mL    OUT:    Indwelling Catheter - Urethral: 300 mL    Voided: 1200 mL  Total OUT: 1500 mL    Total NET: 900 mL      2020 07:01  -  2020 10:01  --------------------------------------------------------  IN:    dextrose 5% + sodium chloride 0.45%: 225 mL    IV PiggyBack: 75 mL    Oral Fluid: 60 mL  Total IN: 360 mL    OUT:  Total OUT: 0 mL    Total NET: 360 mL          Daily     Daily Weight in k.7 (2020 00:34)    LABS:                        8.8    12.34 )-----------( 201      ( 2020 00:31 )             27.0     02-03    136  |  103  |  40<H>  ----------------------------<  107<H>  4.0   |  18<L>  |  1.58<H>    Ca    8.6      2020 00:31  Phos  3.4     02-03  Mg     2.1     02-03                        PHYSICAL EXAM:  General: NAD  HEENT: NC/AT  Pulmonary: Breathing comfortably on RA  Cardiovascular: NSR, no murmurs  Abdominal: soft, non distended, some epigastric tenderness, ostomy pink with no gas and bowel sweat in the ostomy bag, midline wound vac in place with good suction  Extremities: Left knee dressing in place c/d/i

## 2020-02-03 NOTE — CONSULT NOTE ADULT - SUBJECTIVE AND OBJECTIVE BOX
Orthopaedic Surgery Consult Note    Pt is a 74y female s/p L TKA on 1/24/20 with Dr. Ellis who presented with a perforated bowel requiring Ex Lap and sen procedure on 1/30. Pt is a community ambulator at baseline, but has been ambulating with a walker since surgery. Pt has been able to bear weight on affected extremity since surgery without difficulty . Pt denies numbness, tingling, or paresthesias in affected limb. Pt denies recent trauma and denies any other orthopaedic injuries at this time. Pt has been taking Lovenox. Denies fevers, dizziness, CP, SOB, N/V, calf pain.    PMHx:  Diverticulitis of intestine with perforation or abscess without bleeding  Hard of hearing  SPL (spondylolisthesis)  H/O scoliosis  Spinal stenosis  Anxiety  Osteopenia  No pertinent past medical history  Pneumoperitoneum  Pneumoperitoneum  Perforated diverticulum  Left oophorectomy  Colectomy with end colostomy  Open resection, sigmoid colon  S/P total knee replacement, left  S/P total knee arthroplasty, right  S/P cataract extraction and insertion of intraocular lens  S/P hammer toe correction  S/P shoulder surgery  H/O carpal tunnel repair  H/O total knee replacement, right  ABD PAIN ABCESS SOB  RAD CDS    PSHx:  R TKA ([facility], 15 years ago)  L TKA (Dr. Ellis, 1/24/20)    Allergies:  No Known Allergies    Medications:  acetaminophen  IVPB .. 1000 milliGRAM(s) IV Intermittent every 6 hours  benzocaine 15 mG/menthol 3.6 mG (Sugar-Free) Lozenge 1 Lozenge Oral every 4 hours PRN  chlorhexidine 2% Cloths 1 Application(s) Topical daily  dextrose 5% + sodium chloride 0.45% 1000 milliLiter(s) IV Continuous <Continuous>  enoxaparin Injectable 30 milliGRAM(s) SubCutaneous daily  HYDROmorphone  Injectable 0.5 milliGRAM(s) IV Push every 3 hours PRN  pantoprazole  Injectable 40 milliGRAM(s) IV Push daily  piperacillin/tazobactam IVPB.. 3.375 Gram(s) IV Intermittent every 8 hours    Social: Denies smoking, EtOH, illicit drug use.    Labs:                        8.8    12.34 )-----------( 201      ( 03 Feb 2020 00:31 )             27.0     02-03    136  |  103  |  40<H>  ----------------------------<  107<H>  4.0   |  18<L>  |  1.58<H>    Ca    8.6      03 Feb 2020 00:31  Phos  3.4     02-03  Mg     2.1     02-03      Imaging:  XR L Knee: pending    Vitals:  Vital Signs Last 24 Hrs  T(C): 37 (03 Feb 2020 12:00), Max: 37 (03 Feb 2020 12:00)  T(F): 98.6 (03 Feb 2020 12:00), Max: 98.6 (03 Feb 2020 12:00)  HR: 73 (03 Feb 2020 12:00) (67 - 85)  BP: 141/78 (03 Feb 2020 12:00) (114/60 - 157/70)  BP(mean): 101 (03 Feb 2020 12:00) (82 - 116)  RR: 25 (03 Feb 2020 12:00) (16 - 44)  SpO2: 94% (03 Feb 2020 12:00) (91% - 98%)    Physical Exam:  Gen: NAD, AAOx3    LLE:  Well-approximated surgical incision with zipline closure  No active/expressible drainage  No effusion  No edema, erythema, ecchymosis  No gross deformity  NTTP about knee  No bony TTP of Hip/Foot/Toes  Able to SLR due to pain  Painless ROM of knee  Non-tender with logroll  +EHL/FHL/TA/GS  SILT L2-S1  +DP/PT Pulses  Compartments soft and compressible  No calf TTP B/L

## 2020-02-03 NOTE — PROGRESS NOTE ADULT - ASSESSMENT
Betty Rowe is a 74 year old F who is s/p Salas's for a perforated diverticulitis. Hospital course complicated by sepsis, interval requirement of pressors in the OR. Transferred in stable condition to the SICU for further resuscitation and hemodynamic monitoring.     PLAN BY SYSTEMS  Neurological:    - Dilaudid and tylenol prn for pain control      Pulmonary:  - No active issues    Cardiovascular: No active issues  -- Patient is off of all pressor support.    GI: S/p salas's for perforated diverdticulitis  - NPO; monitor for bowel function   - Continue protonix     Renal:  -- D5/0.45NS with bicarbonate @ 75ml/hr   -- Replete electrolytes PRN    Hematological:  - Continue renally dosed Lovenox for VTE prophylaxis     Infectious Disease:  -- Cultures: Ordered, and pending.  -- Antibiotics: Zosyn x4 days  -- PLAN: Follow-up cultures and continue antibiotics.  - F/u vancomycin trough       Endocrine:  -- No active issues    Musculoskeletal:  -- PLAN: S/p left knee replacement. Prevena vac removed. Ambulating.     Dispo: SICU full code    - Sukumar Peraza PA-C

## 2020-02-03 NOTE — DIETITIAN INITIAL EVALUATION ADULT. - ADD RECOMMEND
1) Monitor GI function. Advance to clear liquids then Low Fiber diet as tolerated. 2) RD will return for nutrition education when diet is advanced.

## 2020-02-03 NOTE — DIETITIAN INITIAL EVALUATION ADULT. - NUTRITION DIAGNOSITC TERMINOLOGY #1
Pt stable throughout shift. Temperature afebrile. HR is NSR (80-95). SBP maintained >100mmHg w/o vasopressors. O2 sats remain >95 with mechanical ventilation (40% FiO2, PEEP 6, rate 16). Propofol infusing to maintain RASS score -1. CRRT UF goal of 350 met. Patient currently appears asleep, free of injury. Please refer to flowsheet data for full assessment details.    Other...

## 2020-02-03 NOTE — DIETITIAN INITIAL EVALUATION ADULT. - REASON INDICATOR FOR ASSESSMENT
Nutrition Assessment warranted for length of stay on 8ICU.  Information obtained from: patient, medical record,  Per chart:  "74 year old F who is s/p Salas's for a perforated diverticulitis. Hospital course complicated by sepsis, interval requirement of pressors in the OR. Transferred in stable condition to the SICU for further resuscitation and hemodynamic monitoring." Nutrition Assessment warranted for length of stay on 8ICU.  Information obtained from: patient, medical record  Per chart:  "74 year old F who is s/p Salas's for a perforated diverticulitis. Hospital course complicated by sepsis, interval requirement of pressors in the OR. Transferred in stable condition to the SICU for further resuscitation and hemodynamic monitoring."

## 2020-02-03 NOTE — CONSULT NOTE ADULT - ATTENDING COMMENTS
I agree with the above note on this patient. All pertinent films have been reviewed. Please refer to clinical documentation of the history, physical examinations, data summary, and both assessment and plan as documented above and with which I agree.    Tucker Marcelino MD  Attending Orthopedic Surgeon
74 y F w perf diverticulitis s/p Luisana's pouch post op tachycardic off pressors, intubated.   on arrival found to be intravascular volume depleted based on bedside ultrasound, bolus of crystaloids and labs.  plan for weaning sedation and mech vent to extubate in the morning. cont IV antibiotics, no chemical VTE PPX for now.

## 2020-02-03 NOTE — DIETITIAN INITIAL EVALUATION ADULT. - OTHER INFO
INFORMATION PTA  Diet PTA: [Pt interview pending]. Per H&P, pt admitted with nausea/vomiting and abdominal pain x 4 days.  Nutrition status PTA:  Nutrition Supplements PTA: none noted  Food Allergies: NKFA  Weight History PTA: Per HIE: 60.2Kg (1/13/20), 59Kg (1/30/20), 65.9Kg (dosing 1/31), 72.7Kg (current 2/3)  Other Information:    INFORMATION THIS ADMISSION  Colostomy Output x 24-hours: none  Other  Information:  Therapeutic Diet Education Provided:  Education Handouts Provided: INFORMATION PTA  Diet PTA: Pt reports she eats well at home and was eating fine after recent discharge for knee replacement, except for constipation (remedied with dulcolax and prune juice). Pt contacted her MD when she noticed an abdominal "bump", later confirmed to be a perforation. Pt denies nausea/vomiting or pain PTA.  Nutrition status PTA: Well nourished  Nutrition Supplements PTA: none noted  Food Allergies: NKFA  Weight History PTA: Per HIE: 60.2Kg (1/13/20), 59Kg (1/30/20). Pt confirms  pounds (59Kg). Pt with recent weight gain likely secondary to fluid shifts: 65.9Kg (dosing 1/31), 72.7Kg (current 2/3).     INFORMATION THIS ADMISSION  Colostomy Output x 24-hours: none  Therapeutic Diet Education Provided: not applicable

## 2020-02-03 NOTE — DIETITIAN INITIAL EVALUATION ADULT. - EST PROTEIN NEEDS7
"Subjective:       Patient ID: Marilyn Covington is a 51 y.o. female.    Chief Complaint: Follow-up    51-year-old  female patient with Patient Active Problem List:     GERD (gastroesophageal reflux disease)     White coat syndrome with hypertension     Vitamin D deficiency  Here for routine annual physicals and reports that she has been signed up with hypertension digital program and her blood pressures have been stable.  Patient reports that she has white coat hypertension, which elevates her blood pressures.   Has been taking her medications regularly  Denies any chest pain or difficulty breathing, abdominal discomfort nausea vomiting      Review of Systems   Constitutional: Negative for fatigue.   Eyes: Negative for visual disturbance.   Respiratory: Negative for shortness of breath.    Cardiovascular: Negative for chest pain and leg swelling.   Gastrointestinal: Negative for abdominal pain, nausea and vomiting.   Musculoskeletal: Negative for myalgias.   Skin: Negative for rash.   Neurological: Negative for light-headedness and headaches.   Psychiatric/Behavioral: Negative for sleep disturbance.         /77   Pulse 92   Temp 97.9 °F (36.6 °C) (Tympanic)   Resp 16   Ht 5' 9" (1.753 m)   Wt 88.6 kg (195 lb 5.2 oz)   SpO2 98%   BMI 28.84 kg/m²   Objective:      Physical Exam   Constitutional: She is oriented to person, place, and time. She appears well-developed and well-nourished.   HENT:   Head: Normocephalic and atraumatic.   Mouth/Throat: Oropharynx is clear and moist.   Cardiovascular: Normal rate, regular rhythm and normal heart sounds.   No murmur heard.  Pulmonary/Chest: Effort normal and breath sounds normal. She has no wheezes.   Abdominal: Soft. Bowel sounds are normal. There is no tenderness.   Musculoskeletal: She exhibits no edema.   Neurological: She is alert and oriented to person, place, and time.   Skin: Skin is warm and dry. No rash noted.   Psychiatric: She has a " normal mood and affect.       No visits with results within 2 Week(s) from this visit.   Latest known visit with results is:   Lab Visit on 06/20/2019   Component Date Value Ref Range Status    WBC 06/20/2019 3.85* 3.90 - 12.70 K/uL Final    RBC 06/20/2019 4.89  4.00 - 5.40 M/uL Final    Hemoglobin 06/20/2019 13.3  12.0 - 16.0 g/dL Final    Hematocrit 06/20/2019 41.6  37.0 - 48.5 % Final    Mean Corpuscular Volume 06/20/2019 85  82 - 98 fL Final    Mean Corpuscular Hemoglobin 06/20/2019 27.2  27.0 - 31.0 pg Final    Mean Corpuscular Hemoglobin Conc 06/20/2019 32.0  32.0 - 36.0 g/dL Final    RDW 06/20/2019 14.9* 11.5 - 14.5 % Final    Platelets 06/20/2019 283  150 - 350 K/uL Final    MPV 06/20/2019 10.4  9.2 - 12.9 fL Final    Immature Granulocytes 06/20/2019 0.0  0.0 - 0.5 % Final    Gran # (ANC) 06/20/2019 2.2  1.8 - 7.7 K/uL Final    Immature Grans (Abs) 06/20/2019 0.00  0.00 - 0.04 K/uL Final    Comment: Mild elevation in immature granulocytes is non specific and   can be seen in a variety of conditions including stress response,   acute inflammation, trauma and pregnancy. Correlation with other   laboratory and clinical findings is essential.      Lymph # 06/20/2019 1.3  1.0 - 4.8 K/uL Final    Mono # 06/20/2019 0.3  0.3 - 1.0 K/uL Final    Eos # 06/20/2019 0.0  0.0 - 0.5 K/uL Final    Baso # 06/20/2019 0.02  0.00 - 0.20 K/uL Final    nRBC 06/20/2019 0  0 /100 WBC Final    Gran% 06/20/2019 56.3  38.0 - 73.0 % Final    Lymph% 06/20/2019 34.8  18.0 - 48.0 % Final    Mono% 06/20/2019 8.1  4.0 - 15.0 % Final    Eosinophil% 06/20/2019 0.3  0.0 - 8.0 % Final    Basophil% 06/20/2019 0.5  0.0 - 1.9 % Final    Differential Method 06/20/2019 Automated   Final    Sodium 06/20/2019 139  136 - 145 mmol/L Final    Potassium 06/20/2019 4.0  3.5 - 5.1 mmol/L Final    Chloride 06/20/2019 103  95 - 110 mmol/L Final    CO2 06/20/2019 26  23 - 29 mmol/L Final    Glucose 06/20/2019 86  70 - 110 mg/dL  Final    BUN, Bld 06/20/2019 10  6 - 20 mg/dL Final    Creatinine 06/20/2019 0.9  0.5 - 1.4 mg/dL Final    Calcium 06/20/2019 10.6* 8.7 - 10.5 mg/dL Final    Total Protein 06/20/2019 7.7  6.0 - 8.4 g/dL Final    Albumin 06/20/2019 4.0  3.5 - 5.2 g/dL Final    Total Bilirubin 06/20/2019 0.4  0.1 - 1.0 mg/dL Final    Comment: For infants and newborns, interpretation of results should be based  on gestational age, weight and in agreement with clinical  observations.  Premature Infant recommended reference ranges:  Up to 24 hours.............<8.0 mg/dL  Up to 48 hours............<12.0 mg/dL  3-5 days..................<15.0 mg/dL  6-29 days.................<15.0 mg/dL      Alkaline Phosphatase 06/20/2019 70  55 - 135 U/L Final    AST 06/20/2019 20  10 - 40 U/L Final    ALT 06/20/2019 19  10 - 44 U/L Final    Anion Gap 06/20/2019 10  8 - 16 mmol/L Final    eGFR if African American 06/20/2019 >60.0  >60 mL/min/1.73 m^2 Final    eGFR if non African American 06/20/2019 >60.0  >60 mL/min/1.73 m^2 Final    Comment: Calculation used to obtain the estimated glomerular filtration  rate (eGFR) is the CKD-EPI equation.       Cholesterol 06/20/2019 190  120 - 199 mg/dL Final    Comment: The National Cholesterol Education Program (NCEP) has set the  following guidelines (reference ranges) for Cholesterol:  Optimal.....................<200 mg/dL  Borderline High.............200-239 mg/dL  High........................> or = 240 mg/dL      Triglycerides 06/20/2019 52  30 - 150 mg/dL Final    Comment: The National Cholesterol Education Program (NCEP) has set the  following guidelines (reference values) for triglycerides:  Normal......................<150 mg/dL  Borderline High.............150-199 mg/dL  High........................200-499 mg/dL      HDL 06/20/2019 76* 40 - 75 mg/dL Final    Comment: The National Cholesterol Education Program (NCEP) has set the  following guidelines (reference values) for HDL  Cholesterol:  Low...............<40 mg/dL  Optimal...........>60 mg/dL      LDL Cholesterol 06/20/2019 103.6  63.0 - 159.0 mg/dL Final    Comment: The National Cholesterol Education Program (NCEP) has set the  following guidelines (reference values) for LDL Cholesterol:  Optimal.......................<130 mg/dL  Borderline High...............130-159 mg/dL  High..........................160-189 mg/dL  Very High.....................>190 mg/dL      Hdl/Cholesterol Ratio 06/20/2019 40.0  20.0 - 50.0 % Final    Total Cholesterol/HDL Ratio 06/20/2019 2.5  2.0 - 5.0 Final    Non-HDL Cholesterol 06/20/2019 114  mg/dL Final    Comment: Risk category and Non-HDL cholesterol goals:  Coronary heart disease (CHD)or equivalent (10-year risk of CHD >20%):  Non-HDL cholesterol goal     <130 mg/dL  Two or more CHD risk factors and 10-year risk of CHD <= 20%:  Non-HDL cholesterol goal     <160 mg/dL  0 to 1 CHD risk factor:  Non-HDL cholesterol goal     <190 mg/dL      TSH 06/20/2019 3.703  0.400 - 4.000 uIU/mL Final    H Pylori IgG Interp 06/20/2019 Negative  Negative Final    Vit D, 25-Hydroxy 06/20/2019 25* 30 - 96 ng/mL Final    Comment: Vitamin D deficiency.........<10 ng/mL                              Vitamin D insufficiency......10-29 ng/mL       Vitamin D sufficiency........> or equal to 30 ng/mL  Vitamin D toxicity............>100 ng/mL         Assessment/Plan:   1. Routine general medical examination at a health care facility  Blood pressure elevated today.  Clinical exam stable today  Encouraged to start lifestyle modifications with low-fat and low-cholesterol diet and exercise 30 min daily    2. White coat syndrome with hypertension  3. Essential hypertension  Reviewed blood pressure readings through hypertension digital program which has been stable  Patient does have white coat hypertension, and has been told by her cardiologist as well  Encouraged to avoid stress and restrict salt intake  Continue amlodipine  2.5 mg    4. Gastroesophageal reflux disease, esophagitis presence not specified  Stable on omeprazole 40 mg daily as needed    5. Vitamin D deficiency  Noted low vitamin-D levels, advised to take over-the-counter vitamin D3 2000 units daily            70.8

## 2020-02-03 NOTE — PROGRESS NOTE ADULT - SUBJECTIVE AND OBJECTIVE BOX
HISTORY  74y Female PMHx, PSHx of R TKR 15 years ago and left TKR this past Friday at [place] recently admitted after presenting with 4 days of abdominal pain not associated with nausea, vomiting, fevers or chills. The patient had worsening abdominal pain and distension that failed to improved over the course of the week prompting her to go see her PMD. A CT abdomen pelvis was obtained at an outside facility which revealed findings concerning for free air. The patient was advised to present to the ED. Upon arrival, she was found to be hypotensive to the 90's, and tachycardic to the low 100's. The patient was resuscitated and started on antibiotics due to concerns for a colonic perforation and was taken to the operating room emergently. In the OR, the patient was found to have purulent ascites with several areas of organizing abscess cavities. She was also found to have a severely thickened sigmoid colon with surrounding inflammatory changes suggestive of perforated diverticulitis. A Luisana's procedure was performed and the patient was transferred to the SICU for post-operative hemodynamic monitoring and further resuscitation.     In the OR, the patient received 2 units pRBC, 1.5 L of IV fluids,  cc,  cc.         24 HOUR EVENTS:  - Half strength bicarbonate infusion restarted due to persistent acidosis  - Akins discontinued and patient passed TOV   - Remains NPO; awaiting bowel function       SUBJECTIVE/ROS:  [ ] A ten-point review of systems was otherwise negative except as noted.  [ ] Due to altered mental status/intubation, subjective information were not able to be obtained from the patient. History was obtained, to the extent possible, from review of the chart and collateral sources of information.      NEURO  Exam: awake, alert, oriented  Meds: HYDROmorphone  Injectable 0.5 milliGRAM(s) IV Push every 3 hours PRN Severe Pain (7 - 10)    [x] Adequacy of sedation and pain control has been assessed and adjusted      RESPIRATORY  RR: 30 (02-03-20 @ 00:00) (18 - 43)  SpO2: 94% (02-03-20 @ 00:00) (91% - 98%)  Exam: unlabored, clear to auscultation bilaterally  Mechanical Ventilation: none  [N/A] Extubation Readiness Assessed  Meds: none      CARDIOVASCULAR  HR: 85 (02-03-20 @ 00:00) (70 - 85)  BP: 156/68 (02-03-20 @ 00:00) (111/57 - 156/68)  BP(mean): 98 (02-03-20 @ 00:00) (78 - 101)  Exam: regular rate and rhythm  Cardiac Rhythm: sinus  Perfusion     [x]Adequate   [ ]Inadequate  Mentation   [x]Normal       [ ]Reduced  Extremities  [x]Warm         [ ]Cool  Volume Status [ ]Hypervolemic [x]Euvolemic [ ]Hypovolemic  Meds: none      GI/NUTRITION  Exam: soft, nontender, nondistended  Diet: NPO  Meds: pantoprazole  Injectable 40 milliGRAM(s) IV Push daily      GENITOURINARY  I&O's Detail    02-01 @ 07:01  -  02-02 @ 07:00  --------------------------------------------------------  IN:    dextrose 5% + lactated ringers.: 1650 mL    dextrose 5% + sodium chloride 0.45%: 150 mL    IV PiggyBack: 675 mL    Solution: 450 mL  Total IN: 2925 mL    OUT:    Indwelling Catheter - Urethral: 1055 mL  Total OUT: 1055 mL    Total NET: 1870 mL      02-02 @ 07:01  -  02-03 @ 01:33  --------------------------------------------------------  IN:    dextrose 5% + lactated ringers.: 225 mL    dextrose 5% + sodium chloride 0.45%: 975 mL    IV PiggyBack: 175 mL    Solution: 300 mL  Total IN: 1675 mL    OUT:    Indwelling Catheter - Urethral: 300 mL    Voided: 600 mL  Total OUT: 900 mL    Total NET: 775 mL          02-03    136  |  103  |  40<H>  ----------------------------<  107<H>  4.0   |  18<L>  |  1.58<H>    Ca    8.6      03 Feb 2020 00:31  Phos  3.4     02-03  Mg     2.1     02-03      [ ] Akins catheter, indication: N/A  Meds: dextrose 5% + sodium chloride 0.45% 1000 milliLiter(s) IV Continuous <Continuous>        HEMATOLOGIC  Meds: enoxaparin Injectable 30 milliGRAM(s) SubCutaneous daily    [x] VTE Prophylaxis                        8.8    12.34 )-----------( 201      ( 03 Feb 2020 00:31 )             27.0     PT/INR - ( 01 Feb 2020 02:10 )   PT: 12.9 sec;   INR: 1.13 ratio         PTT - ( 01 Feb 2020 02:10 )  PTT:26.8 sec  Transfusion     [ ] PRBC   [ ] Platelets   [ ] FFP   [ ] Cryoprecipitate      INFECTIOUS DISEASES  WBC Count: 12.34 K/uL (02-03 @ 00:31)  WBC Count: 14.06 K/uL (02-02 @ 12:19)    RECENT CULTURES:  Specimen Source: .Body Fluid Peritoneal Fluid  Date/Time: 01-31 @ 09:20  Culture Results:   Few Morganella morganii  Few Enterococcus faecalis  Few Streptococcus anginosus "Susceptibilities not performed"  Gram Stain:   Numerous polymorphonuclear leukocytes per low power field  Numerous Gram positive cocci in pairs per oil power field  Few Gram Positive Rods per oil power field  Rare Gram Negative Rods per oil power field  Organism: Morganella morganii  Specimen Source: .Blood Blood-Peripheral  Date/Time: 01-30 @ 18:54  Culture Results:   No growth to date.  Gram Stain: --  Organism: --    Meds: piperacillin/tazobactam IVPB.. 3.375 Gram(s) IV Intermittent every 8 hours        ENDOCRINE  CAPILLARY BLOOD GLUCOSE        Meds: none      ACCESS DEVICES:  [x] Peripheral IV  [ ] Central Venous Line	[ ] R	[ ] L	[ ] IJ	[ ] Fem	[ ] SC	Placed:   [ ] Arterial Line		[ ] R	[ ] L	[ ] Fem	[ ] Rad	[ ] Ax	Placed:   [ ] PICC:					[ ] Mediport  [ ] Urinary Catheter, Date Placed:   [x] Necessity of urinary, arterial, and venous catheters discussed    OTHER MEDICATIONS:  benzocaine 15 mG/menthol 3.6 mG (Sugar-Free) Lozenge 1 Lozenge Oral every 4 hours PRN  chlorhexidine 2% Cloths 1 Application(s) Topical daily      CODE STATUS: full code       IMAGING: none

## 2020-02-03 NOTE — CONSULT NOTE ADULT - ASSESSMENT
A/P: Pt is a 74y s/p L TKA on 1/24/20.  -Well-healing post-op TKA. No clinical indication of PJI.  -Please order AP and Lateral XR of Left Knee  -DVT PPx  -WBAT RLE/LLE  -PT/OT  -Med management  -No further orthopaedic intervention  -Follow up outpatient with primary orthopaedist Dr. Ellis upon discharge.   -Discussed with Dr. Marcelino.    Jessica Loera M.D.  PGY-2 Orthopaedic Surgery

## 2020-02-03 NOTE — DIETITIAN INITIAL EVALUATION ADULT. - PERTINENT MEDS FT
MEDICATIONS  (STANDING):  acetaminophen  IVPB .. 1000 milliGRAM(s) IV Intermittent every 6 hours  chlorhexidine 2% Cloths 1 Application(s) Topical daily  dextrose 5% + sodium chloride 0.45% 1000 milliLiter(s) (75 mL/Hr) IV Continuous <Continuous>  enoxaparin Injectable 30 milliGRAM(s) SubCutaneous daily  pantoprazole  Injectable 40 milliGRAM(s) IV Push daily  piperacillin/tazobactam IVPB.. 3.375 Gram(s) IV Intermittent every 8 hours

## 2020-02-04 LAB
ANION GAP SERPL CALC-SCNC: 13 MMOL/L — SIGNIFICANT CHANGE UP (ref 5–17)
BUN SERPL-MCNC: 30 MG/DL — HIGH (ref 7–23)
CALCIUM SERPL-MCNC: 8.4 MG/DL — SIGNIFICANT CHANGE UP (ref 8.4–10.5)
CHLORIDE SERPL-SCNC: 102 MMOL/L — SIGNIFICANT CHANGE UP (ref 96–108)
CO2 SERPL-SCNC: 22 MMOL/L — SIGNIFICANT CHANGE UP (ref 22–31)
CREAT SERPL-MCNC: 1.32 MG/DL — HIGH (ref 0.5–1.3)
CULTURE RESULTS: SIGNIFICANT CHANGE UP
CULTURE RESULTS: SIGNIFICANT CHANGE UP
GLUCOSE BLDC GLUCOMTR-MCNC: 138 MG/DL — HIGH (ref 70–99)
GLUCOSE SERPL-MCNC: 116 MG/DL — HIGH (ref 70–99)
HCT VFR BLD CALC: 29 % — LOW (ref 34.5–45)
HGB BLD-MCNC: 9.5 G/DL — LOW (ref 11.5–15.5)
MAGNESIUM SERPL-MCNC: 1.9 MG/DL — SIGNIFICANT CHANGE UP (ref 1.6–2.6)
MCHC RBC-ENTMCNC: 29 PG — SIGNIFICANT CHANGE UP (ref 27–34)
MCHC RBC-ENTMCNC: 32.8 GM/DL — SIGNIFICANT CHANGE UP (ref 32–36)
MCV RBC AUTO: 88.4 FL — SIGNIFICANT CHANGE UP (ref 80–100)
NRBC # BLD: 0 /100 WBCS — SIGNIFICANT CHANGE UP (ref 0–0)
PHOSPHATE SERPL-MCNC: 2.8 MG/DL — SIGNIFICANT CHANGE UP (ref 2.5–4.5)
PLATELET # BLD AUTO: 232 K/UL — SIGNIFICANT CHANGE UP (ref 150–400)
POTASSIUM SERPL-MCNC: 3.5 MMOL/L — SIGNIFICANT CHANGE UP (ref 3.5–5.3)
POTASSIUM SERPL-SCNC: 3.5 MMOL/L — SIGNIFICANT CHANGE UP (ref 3.5–5.3)
RBC # BLD: 3.28 M/UL — LOW (ref 3.8–5.2)
RBC # FLD: 15.8 % — HIGH (ref 10.3–14.5)
SODIUM SERPL-SCNC: 137 MMOL/L — SIGNIFICANT CHANGE UP (ref 135–145)
SPECIMEN SOURCE: SIGNIFICANT CHANGE UP
SPECIMEN SOURCE: SIGNIFICANT CHANGE UP
WBC # BLD: 10.49 K/UL — SIGNIFICANT CHANGE UP (ref 3.8–10.5)
WBC # FLD AUTO: 10.49 K/UL — SIGNIFICANT CHANGE UP (ref 3.8–10.5)

## 2020-02-04 PROCEDURE — 93971 EXTREMITY STUDY: CPT | Mod: 26

## 2020-02-04 PROCEDURE — 99232 SBSQ HOSP IP/OBS MODERATE 35: CPT

## 2020-02-04 RX ORDER — HYDROMORPHONE HYDROCHLORIDE 2 MG/ML
0.5 INJECTION INTRAMUSCULAR; INTRAVENOUS; SUBCUTANEOUS EVERY 6 HOURS
Refills: 0 | Status: DISCONTINUED | OUTPATIENT
Start: 2020-02-04 | End: 2020-02-04

## 2020-02-04 RX ORDER — POLYETHYLENE GLYCOL 3350 17 G/17G
17 POWDER, FOR SOLUTION ORAL DAILY
Refills: 0 | Status: DISCONTINUED | OUTPATIENT
Start: 2020-02-04 | End: 2020-02-05

## 2020-02-04 RX ORDER — MAGNESIUM SULFATE 500 MG/ML
2 VIAL (ML) INJECTION ONCE
Refills: 0 | Status: COMPLETED | OUTPATIENT
Start: 2020-02-04 | End: 2020-02-04

## 2020-02-04 RX ORDER — POTASSIUM PHOSPHATE, MONOBASIC POTASSIUM PHOSPHATE, DIBASIC 236; 224 MG/ML; MG/ML
15 INJECTION, SOLUTION INTRAVENOUS ONCE
Refills: 0 | Status: COMPLETED | OUTPATIENT
Start: 2020-02-04 | End: 2020-02-04

## 2020-02-04 RX ORDER — OXYCODONE HYDROCHLORIDE 5 MG/1
5 TABLET ORAL EVERY 4 HOURS
Refills: 0 | Status: DISCONTINUED | OUTPATIENT
Start: 2020-02-04 | End: 2020-02-11

## 2020-02-04 RX ORDER — OXYCODONE HYDROCHLORIDE 5 MG/1
10 TABLET ORAL EVERY 6 HOURS
Refills: 0 | Status: DISCONTINUED | OUTPATIENT
Start: 2020-02-04 | End: 2020-02-11

## 2020-02-04 RX ORDER — SODIUM CHLORIDE 9 MG/ML
1000 INJECTION, SOLUTION INTRAVENOUS
Refills: 0 | Status: DISCONTINUED | OUTPATIENT
Start: 2020-02-04 | End: 2020-02-04

## 2020-02-04 RX ORDER — ACETAMINOPHEN 500 MG
975 TABLET ORAL EVERY 6 HOURS
Refills: 0 | Status: DISCONTINUED | OUTPATIENT
Start: 2020-02-04 | End: 2020-02-11

## 2020-02-04 RX ORDER — PANTOPRAZOLE SODIUM 20 MG/1
40 TABLET, DELAYED RELEASE ORAL
Refills: 0 | Status: DISCONTINUED | OUTPATIENT
Start: 2020-02-04 | End: 2020-02-11

## 2020-02-04 RX ORDER — CHLORHEXIDINE GLUCONATE 213 G/1000ML
1 SOLUTION TOPICAL
Refills: 0 | Status: DISCONTINUED | OUTPATIENT
Start: 2020-02-04 | End: 2020-02-04

## 2020-02-04 RX ORDER — POTASSIUM CHLORIDE 20 MEQ
20 PACKET (EA) ORAL
Refills: 0 | Status: COMPLETED | OUTPATIENT
Start: 2020-02-04 | End: 2020-02-04

## 2020-02-04 RX ORDER — ENOXAPARIN SODIUM 100 MG/ML
40 INJECTION SUBCUTANEOUS DAILY
Refills: 0 | Status: DISCONTINUED | OUTPATIENT
Start: 2020-02-04 | End: 2020-02-11

## 2020-02-04 RX ADMIN — POTASSIUM PHOSPHATE, MONOBASIC POTASSIUM PHOSPHATE, DIBASIC 62.5 MILLIMOLE(S): 236; 224 INJECTION, SOLUTION INTRAVENOUS at 02:25

## 2020-02-04 RX ADMIN — OXYCODONE HYDROCHLORIDE 5 MILLIGRAM(S): 5 TABLET ORAL at 23:58

## 2020-02-04 RX ADMIN — Medication 400 MILLIGRAM(S): at 00:00

## 2020-02-04 RX ADMIN — OXYCODONE HYDROCHLORIDE 5 MILLIGRAM(S): 5 TABLET ORAL at 14:44

## 2020-02-04 RX ADMIN — OXYCODONE HYDROCHLORIDE 5 MILLIGRAM(S): 5 TABLET ORAL at 20:55

## 2020-02-04 RX ADMIN — SODIUM CHLORIDE 50 MILLILITER(S): 9 INJECTION, SOLUTION INTRAVENOUS at 02:30

## 2020-02-04 RX ADMIN — OXYCODONE HYDROCHLORIDE 5 MILLIGRAM(S): 5 TABLET ORAL at 07:58

## 2020-02-04 RX ADMIN — CHLORHEXIDINE GLUCONATE 1 APPLICATION(S): 213 SOLUTION TOPICAL at 05:54

## 2020-02-04 RX ADMIN — Medication 1000 MILLIGRAM(S): at 06:05

## 2020-02-04 RX ADMIN — BENZOCAINE AND MENTHOL 1 LOZENGE: 5; 1 LIQUID ORAL at 09:23

## 2020-02-04 RX ADMIN — SERTRALINE 150 MILLIGRAM(S): 25 TABLET, FILM COATED ORAL at 11:10

## 2020-02-04 RX ADMIN — Medication 20 MILLIEQUIVALENT(S): at 03:27

## 2020-02-04 RX ADMIN — OXYCODONE HYDROCHLORIDE 5 MILLIGRAM(S): 5 TABLET ORAL at 15:14

## 2020-02-04 RX ADMIN — ENOXAPARIN SODIUM 40 MILLIGRAM(S): 100 INJECTION SUBCUTANEOUS at 11:07

## 2020-02-04 RX ADMIN — OXYCODONE HYDROCHLORIDE 5 MILLIGRAM(S): 5 TABLET ORAL at 07:28

## 2020-02-04 RX ADMIN — Medication 1000 MILLIGRAM(S): at 00:20

## 2020-02-04 RX ADMIN — OXYCODONE HYDROCHLORIDE 5 MILLIGRAM(S): 5 TABLET ORAL at 21:25

## 2020-02-04 RX ADMIN — Medication 20 MILLIEQUIVALENT(S): at 07:28

## 2020-02-04 RX ADMIN — Medication 400 MILLIGRAM(S): at 05:53

## 2020-02-04 RX ADMIN — Medication 50 GRAM(S): at 02:25

## 2020-02-04 RX ADMIN — Medication 20 MILLIEQUIVALENT(S): at 05:54

## 2020-02-04 RX ADMIN — PANTOPRAZOLE SODIUM 40 MILLIGRAM(S): 20 TABLET, DELAYED RELEASE ORAL at 06:48

## 2020-02-04 NOTE — CHART NOTE - NSCHARTNOTEFT_GEN_A_CORE
GENERAL SURGERY FLOOR TRANSFER NOTE    74y Female transferred to floor from SICU    SUBJECTIVE: Patient tolerating regular diet, ostomy with stool and gas      OBJECTIVE:    T(C): 37 (02-04-20 @ 15:00), Max: 37.6 (02-03-20 @ 19:00)  HR: 69 (02-04-20 @ 15:00) (64 - 77)  BP: 134/65 (02-04-20 @ 15:00) (126/69 - 170/71)  RR: 24 (02-04-20 @ 15:00) (17 - 44)  SpO2: 97% (02-04-20 @ 15:00) (92% - 99%)  Wt(kg): --      I&O's Summary    03 Feb 2020 07:01  -  04 Feb 2020 07:00  --------------------------------------------------------  IN: 2570 mL / OUT: 1250 mL / NET: 1320 mL    04 Feb 2020 07:01  -  04 Feb 2020 15:44  --------------------------------------------------------  IN: 452.5 mL / OUT: 450 mL / NET: 2.5 mL                              9.5    10.49 )-----------( 232      ( 04 Feb 2020 00:28 )             29.0       02-04    137  |  102  |  30<H>  ----------------------------<  116<H>  3.5   |  22  |  1.32<H>    Ca    8.4      04 Feb 2020 00:28  Phos  2.8     02-04  Mg     1.9     02-04        MEDICATIONS  (STANDING):  enoxaparin Injectable 40 milliGRAM(s) SubCutaneous daily  pantoprazole    Tablet 40 milliGRAM(s) Oral before breakfast  polyethylene glycol 3350 17 Gram(s) Oral daily  sertraline 150 milliGRAM(s) Oral daily    MEDICATIONS  (PRN):  acetaminophen   Tablet .. 975 milliGRAM(s) Oral every 6 hours PRN Mild Pain (1 - 3)  benzocaine 15 mG/menthol 3.6 mG (Sugar-Free) Lozenge 1 Lozenge Oral every 4 hours PRN Sore Throat  oxyCODONE    IR 5 milliGRAM(s) Oral every 4 hours PRN Moderate Pain (4 - 6)  oxyCODONE    IR 10 milliGRAM(s) Oral every 6 hours PRN Severe Pain (7 - 10)        PHYSICAL EXAM:    PHYSICAL EXAM:  General: NAD  HEENT: NC/AT  Pulmonary: Breathing comfortably on RA  Cardiovascular: NSR, no murmurs  Abdominal: soft, non distended, some epigastric tenderness, ostomy pink with gas and stool, midline wound vac in place with good suction  Extremities: Left knee dressing in place c/d/i

## 2020-02-04 NOTE — PROGRESS NOTE ADULT - ASSESSMENT
Betty Rowe is a 74 year old F who is s/p Salas's for a perforated diverticulitis. Hospital course complicated by sepsis, interval requirement of pressors in the OR. Transferred in stable condition to the SICU for further resuscitation and hemodynamic monitoring.     PLAN BY SYSTEMS  Neurological:    - Dilaudid and tylenol prn for pain control      Pulmonary:  - On room air    Cardiovascular: No active issues  - off of all pressor support, hemodynamically stable    GI: S/p salas's for perforated diverdticulitis  - Tolerating CLD  - colostomy function +/+   - Continue protonix     Renal:  - D5/0.45NS with bicarbonate @ 75ml/hr   - Replete electrolytes PRN    Hematological:  - Continue renally dosed Lovenox for VTE prophylaxis     Infectious Disease:  - OR cx growing Morganella, enteroccocus, strep anginosus  - Antibiotics: completed Zosyn x4 days    Endocrine:  - No active issues    Musculoskeletal:  - PLAN: S/p left knee replacement. Prevena vac removed. Ambulating well     Dispo: SICU full code

## 2020-02-04 NOTE — PROGRESS NOTE ADULT - SUBJECTIVE AND OBJECTIVE BOX
SURGERY DAILY PROGRESS NOTE:       SUBJECTIVE/ROS: Patient examined at bedside. Passed gas and stool overnight. Bicarb dripped d/guero, regular diet   Denies nausea, vomiting, chest pain, shortness of breath         MEDICATIONS  (STANDING):  chlorhexidine 2% Cloths 1 Application(s) Topical <User Schedule>  enoxaparin Injectable 40 milliGRAM(s) SubCutaneous daily  pantoprazole    Tablet 40 milliGRAM(s) Oral before breakfast  polyethylene glycol 3350 17 Gram(s) Oral daily  sertraline 150 milliGRAM(s) Oral daily    MEDICATIONS  (PRN):  acetaminophen   Tablet .. 975 milliGRAM(s) Oral every 6 hours PRN Mild Pain (1 - 3)  benzocaine 15 mG/menthol 3.6 mG (Sugar-Free) Lozenge 1 Lozenge Oral every 4 hours PRN Sore Throat  oxyCODONE    IR 5 milliGRAM(s) Oral every 4 hours PRN Moderate Pain (4 - 6)  oxyCODONE    IR 10 milliGRAM(s) Oral every 6 hours PRN Severe Pain (7 - 10)      OBJECTIVE:    Vital Signs Last 24 Hrs  T(C): 36.6 (2020 11:00), Max: 37.6 (2020 19:00)  T(F): 97.9 (2020 11:00), Max: 99.7 (2020 19:00)  HR: 70 (2020 11:00) (64 - 77)  BP: 135/82 (2020 11:00) (126/69 - 170/71)  BP(mean): 103 (2020 11:00) (67 - 113)  RR: 25 (2020 11:00) (17 - 44)  SpO2: 98% (2020 11:00) (88% - 99%)        I&O's Detail    2020 07:01  -  2020 07:00  --------------------------------------------------------  IN:    dextrose 5% + sodium chloride 0.45%: 1425 mL    IV PiggyBack: 525 mL    lactated ringers.: 250 mL    Oral Fluid: 120 mL    Solution: 250 mL  Total IN: 2570 mL    OUT:    Colostomy: 50 mL    Voided: 1200 mL  Total OUT: 1250 mL    Total NET: 1320 mL      2020 07:01  -  2020 11:28  --------------------------------------------------------  IN:    IV PiggyBack: 62.5 mL    lactated ringers.: 150 mL    Oral Fluid: 120 mL  Total IN: 332.5 mL    OUT:    Colostomy: 150 mL    Voided: 300 mL  Total OUT: 450 mL    Total NET: -117.5 mL          Daily     Daily Weight in k.1 (2020 06:00)    LABS:                        9.5    10.49 )-----------( 232      ( 2020 00:28 )             29.0     02-04    137  |  102  |  30<H>  ----------------------------<  116<H>  3.5   |  22  |  1.32<H>    Ca    8.4      2020 00:28  Phos  2.8     02-04  Mg     1.9     02-04                      PHYSICAL EXAM:  General: NAD  HEENT: NC/AT  Pulmonary: Breathing comfortably on RA  Cardiovascular: NSR, no murmurs  Abdominal: soft, non distended, some epigastric tenderness, ostomy pink with no gas and bowel sweat in the ostomy bag, midline wound vac in place with good suction  Extremities: Left knee dressing in place c/d/i

## 2020-02-04 NOTE — PROGRESS NOTE ADULT - SUBJECTIVE AND OBJECTIVE BOX
SICU DAILY PROGRESS NOTE    74y Female PMHx, PSHx of R TKR 15 years ago and left TKR this past Friday at [facility] recently admitted after presenting with 4 days of abdominal pain not associated with nausea, vomiting, fevers or chills. The patient had worsening abdominal pain and distension that failed to improved over the course of the week prompting her to go see her PMD. A CT abdomen pelvis was obtained at an outside facility which revealed findings concerning for free air. The patient was advised to present to the ED. Upon arrival, she was found to be hypotensive to the 90's, and tachycardic to the low 100's. The patient was resuscitated and started on antibiotics due to concerns for a colonic perforation and was taken to the operating room emergently. In the OR, the patient was found to have purulent ascites with several areas of organizing abscess cavities. She was also found to have a severely thickened sigmoid colon with surrounding inflammatory changes suggestive of perforated diverticulitis. A Luisana's procedure was performed and the patient was transferred to the SICU for post-operative hemodynamic monitoring and further resuscitation.     In the OR, the patient received 2 units pRBC, 1.5 L of IV fluids,  cc,  cc.       24 HOUR EVENTS:  - advanced to CLD in PM, tolerating well  - OR cx growing Morganella   - colostomy w/ gas during the day, and stool overnight    SUBJECTIVE/ROS:  [x] A ten-point review of systems was otherwise negative except as noted.  [ ] Due to altered mental status/intubation, subjective information were not able to be obtained from the patient. History was obtained, to the extent possible, from review of the chart and collateral sources of information.      NEURO  Exam: awake, alert, oriented  Meds: acetaminophen  IVPB .. 1000 milliGRAM(s) IV Intermittent every 6 hours  HYDROmorphone  Injectable 0.5 milliGRAM(s) IV Push every 3 hours PRN Severe Pain (7 - 10)  sertraline 150 milliGRAM(s) Oral daily    [x] Adequacy of sedation and pain control has been assessed and adjusted      RESPIRATORY  RR: 24 (02-03-20 @ 23:00) (16 - 44)  SpO2: 96% (02-03-20 @ 23:00) (88% - 99%)  Wt(kg): --  Exam: unlabored, clear to auscultation bilaterally  Mechanical Ventilation:     [N/A] Extubation Readiness Assessed  Meds:       CARDIOVASCULAR  HR: 71 (02-03-20 @ 23:00) (67 - 79)  BP: 154/72 (02-03-20 @ 23:00) (122/60 - 157/70)  BP(mean): 103 (02-03-20 @ 23:00) (67 - 116)    Exam: regular rate and rhythm  Cardiac Rhythm: sinus  Perfusion     [x]Adequate   [ ]Inadequate  Mentation   [x]Normal       [ ]Reduced  Extremities  [x]Warm         [ ]Cool  Volume Status [ ]Hypervolemic [x]Euvolemic [ ]Hypovolemic  Meds:       GI/NUTRITION  Exam: soft, nontender, nondistended, incision C/D/I  Diet: CLD  Meds: pantoprazole  Injectable 40 milliGRAM(s) IV Push daily      GENITOURINARY  I&O's Detail    02-02 @ 07:01  -  02-03 @ 07:00  --------------------------------------------------------  IN:    dextrose 5% + lactated ringers.: 225 mL    dextrose 5% + sodium chloride 0.45%: 1575 mL    IV PiggyBack: 300 mL    Solution: 300 mL  Total IN: 2400 mL    OUT:    Indwelling Catheter - Urethral: 300 mL    Voided: 1200 mL  Total OUT: 1500 mL    Total NET: 900 mL      02-03 @ 07:01  -  02-04 @ 00:30  --------------------------------------------------------  IN:    dextrose 5% + sodium chloride 0.45%: 1200 mL    IV PiggyBack: 225 mL    Oral Fluid: 120 mL    Solution: 200 mL  Total IN: 1745 mL    OUT:    Voided: 500 mL  Total OUT: 500 mL    Total NET: 1245 mL      02-03    136  |  103  |  40<H>  ----------------------------<  107<H>  4.0   |  18<L>  |  1.58<H>    Ca    8.6      03 Feb 2020 00:31  Phos  3.4     02-03  Mg     2.1     02-03    [ ] Akins catheter, indication: N/A  Meds: dextrose 5% + sodium chloride 0.45% 1000 milliLiter(s) IV Continuous <Continuous>      HEMATOLOGIC  Meds: enoxaparin Injectable 30 milliGRAM(s) SubCutaneous daily    [x] VTE Prophylaxis                        8.8    12.34 )-----------( 201      ( 03 Feb 2020 00:31 )             27.0     Transfusion     [ ] PRBC   [ ] Platelets   [ ] FFP   [ ] Cryoprecipitate      INFECTIOUS DISEASES  WBC Count: 12.34 K/uL (02-03 @ 00:31)    RECENT CULTURES:  Specimen Source: .Body Fluid peritoneal fluid  Date/Time: 01-31 @ 09:20  Culture Results:   Testing in progress  Gram Stain:   Numerous polymorphonuclear leukocytes per low power field  Numerous Gram positive cocci in pairs per oil power field  Few Gram Positive Rods per oil power field  Rare Gram Negative Rods per oil power field  Organism: Morganella morganii  Enterococcus faecalis  Specimen Source: .Blood Blood-Peripheral  Date/Time: 01-30 @ 18:54  Culture Results:   No growth to date.    ENDOCRINE  CAPILLARY BLOOD GLUCOSE    ACCESS DEVICES:  [x] Peripheral IV  [ ] Central Venous Line	[ ] R	[ ] L	[ ] IJ	[ ] Fem	[ ] SC	Placed:   [ ] Arterial Line		[ ] R	[ ] L	[ ] Fem	[ ] Rad	[ ] Ax	Placed:   [ ] PICC:					[ ] Mediport  [ ] Urinary Catheter, Date Placed:   [x] Necessity of urinary, arterial, and venous catheters discussed    OTHER MEDICATIONS:  benzocaine 15 mG/menthol 3.6 mG (Sugar-Free) Lozenge 1 Lozenge Oral every 4 hours PRN      CODE STATUS: full code SICU DAILY PROGRESS NOTE    74y Female PMHx, PSHx of R TKR 15 years ago and left TKR this past Friday at [facility] recently admitted after presenting with 4 days of abdominal pain not associated with nausea, vomiting, fevers or chills. The patient had worsening abdominal pain and distension that failed to improved over the course of the week prompting her to go see her PMD. A CT abdomen pelvis was obtained at an outside facility which revealed findings concerning for free air. The patient was advised to present to the ED. Upon arrival, she was found to be hypotensive to the 90's, and tachycardic to the low 100's. The patient was resuscitated and started on antibiotics due to concerns for a colonic perforation and was taken to the operating room emergently. In the OR, the patient was found to have purulent ascites with several areas of organizing abscess cavities. She was also found to have a severely thickened sigmoid colon with surrounding inflammatory changes suggestive of perforated diverticulitis. A Luisana's procedure was performed and the patient was transferred to the SICU for post-operative hemodynamic monitoring and further resuscitation.     In the OR, the patient received 2 units pRBC, 1.5 L of IV fluids,  cc,  cc.       24 HOUR EVENTS:  - advanced to CLD in PM, tolerating well  - OR cx growing Morganella   - colostomy w/ gas during the day, and stool overnight  - bicarb gtt discontinued overnight    SUBJECTIVE/ROS:  [x] A ten-point review of systems was otherwise negative except as noted.  [ ] Due to altered mental status/intubation, subjective information were not able to be obtained from the patient. History was obtained, to the extent possible, from review of the chart and collateral sources of information.      NEURO  Exam: awake, alert, oriented  Meds: acetaminophen  IVPB .. 1000 milliGRAM(s) IV Intermittent every 6 hours  HYDROmorphone  Injectable 0.5 milliGRAM(s) IV Push every 3 hours PRN Severe Pain (7 - 10)  sertraline 150 milliGRAM(s) Oral daily    [x] Adequacy of sedation and pain control has been assessed and adjusted      RESPIRATORY  RR: 24 (02-03-20 @ 23:00) (16 - 44)  SpO2: 96% (02-03-20 @ 23:00) (88% - 99%)  Wt(kg): --  Exam: unlabored, clear to auscultation bilaterally  Mechanical Ventilation:     [N/A] Extubation Readiness Assessed  Meds:       CARDIOVASCULAR  HR: 71 (02-03-20 @ 23:00) (67 - 79)  BP: 154/72 (02-03-20 @ 23:00) (122/60 - 157/70)  BP(mean): 103 (02-03-20 @ 23:00) (67 - 116)    Exam: regular rate and rhythm  Cardiac Rhythm: sinus  Perfusion     [x]Adequate   [ ]Inadequate  Mentation   [x]Normal       [ ]Reduced  Extremities  [x]Warm         [ ]Cool  Volume Status [ ]Hypervolemic [x]Euvolemic [ ]Hypovolemic  Meds:       GI/NUTRITION  Exam: soft, nontender, nondistended, incision C/D/I  Diet: CLD  Meds: pantoprazole  Injectable 40 milliGRAM(s) IV Push daily      GENITOURINARY  I&O's Detail    02-02 @ 07:01  -  02-03 @ 07:00  --------------------------------------------------------  IN:    dextrose 5% + lactated ringers.: 225 mL    dextrose 5% + sodium chloride 0.45%: 1575 mL    IV PiggyBack: 300 mL    Solution: 300 mL  Total IN: 2400 mL    OUT:    Indwelling Catheter - Urethral: 300 mL    Voided: 1200 mL  Total OUT: 1500 mL    Total NET: 900 mL      02-03 @ 07:01  -  02-04 @ 00:30  --------------------------------------------------------  IN:    dextrose 5% + sodium chloride 0.45%: 1200 mL    IV PiggyBack: 225 mL    Oral Fluid: 120 mL    Solution: 200 mL  Total IN: 1745 mL    OUT:    Voided: 500 mL  Total OUT: 500 mL    Total NET: 1245 mL      02-03    136  |  103  |  40<H>  ----------------------------<  107<H>  4.0   |  18<L>  |  1.58<H>    Ca    8.6      03 Feb 2020 00:31  Phos  3.4     02-03  Mg     2.1     02-03    [ ] Akins catheter, indication: N/A  Meds: dextrose 5% + sodium chloride 0.45% 1000 milliLiter(s) IV Continuous <Continuous>      HEMATOLOGIC  Meds: enoxaparin Injectable 30 milliGRAM(s) SubCutaneous daily    [x] VTE Prophylaxis                        8.8    12.34 )-----------( 201      ( 03 Feb 2020 00:31 )             27.0     Transfusion     [ ] PRBC   [ ] Platelets   [ ] FFP   [ ] Cryoprecipitate      INFECTIOUS DISEASES  WBC Count: 12.34 K/uL (02-03 @ 00:31)    RECENT CULTURES:  Specimen Source: .Body Fluid peritoneal fluid  Date/Time: 01-31 @ 09:20  Culture Results:   Testing in progress  Gram Stain:   Numerous polymorphonuclear leukocytes per low power field  Numerous Gram positive cocci in pairs per oil power field  Few Gram Positive Rods per oil power field  Rare Gram Negative Rods per oil power field  Organism: Morganella morganii  Enterococcus faecalis  Specimen Source: .Blood Blood-Peripheral  Date/Time: 01-30 @ 18:54  Culture Results:   No growth to date.    ENDOCRINE  CAPILLARY BLOOD GLUCOSE    ACCESS DEVICES:  [x] Peripheral IV  [ ] Central Venous Line	[ ] R	[ ] L	[ ] IJ	[ ] Fem	[ ] SC	Placed:   [ ] Arterial Line		[ ] R	[ ] L	[ ] Fem	[ ] Rad	[ ] Ax	Placed:   [ ] PICC:					[ ] Mediport  [ ] Urinary Catheter, Date Placed:   [x] Necessity of urinary, arterial, and venous catheters discussed    OTHER MEDICATIONS:  benzocaine 15 mG/menthol 3.6 mG (Sugar-Free) Lozenge 1 Lozenge Oral every 4 hours PRN      CODE STATUS: full code SICU DAILY PROGRESS NOTE    74y Female PMHx, PSHx of R TKR 15 years ago and left TKR this past Friday at [facility] recently admitted after presenting with 4 days of abdominal pain not associated with nausea, vomiting, fevers or chills. The patient had worsening abdominal pain and distension that failed to improved over the course of the week prompting her to go see her PMD. A CT abdomen pelvis was obtained at an outside facility which revealed findings concerning for free air. The patient was advised to present to the ED. Upon arrival, she was found to be hypotensive to the 90's, and tachycardic to the low 100's. The patient was resuscitated and started on antibiotics due to concerns for a colonic perforation and was taken to the operating room emergently. In the OR, the patient was found to have purulent ascites with several areas of organizing abscess cavities. She was also found to have a severely thickened sigmoid colon with surrounding inflammatory changes suggestive of perforated diverticulitis. A Luisana's procedure was performed and the patient was transferred to the SICU for post-operative hemodynamic monitoring and further resuscitation.     In the OR, the patient received 2 units pRBC, 1.5 L of IV fluids,  cc,  cc.       24 HOUR EVENTS:  - advanced to CLD in PM, tolerating well  - OR culture growing Morganella   - colostomy w/ gas during the day, and stool overnight  - bicarb gtt discontinued overnight    SUBJECTIVE/ROS:  [x] A ten-point review of systems was otherwise negative except as noted.  [ ] Due to altered mental status/intubation, subjective information were not able to be obtained from the patient. History was obtained, to the extent possible, from review of the chart and collateral sources of information.      NEURO  Exam: awake, alert, oriented  Meds: acetaminophen  IVPB .. 1000 milliGRAM(s) IV Intermittent every 6 hours  HYDROmorphone  Injectable 0.5 milliGRAM(s) IV Push every 3 hours PRN Severe Pain (7 - 10)  sertraline 150 milliGRAM(s) Oral daily    [x] Adequacy of sedation and pain control has been assessed and adjusted      RESPIRATORY  RR: 24 (02-03-20 @ 23:00) (16 - 44)  SpO2: 96% (02-03-20 @ 23:00) (88% - 99%)  Wt(kg): --  Exam: unlabored, clear to auscultation bilaterally  Mechanical Ventilation:     [N/A] Extubation Readiness Assessed  Meds:       CARDIOVASCULAR  HR: 71 (02-03-20 @ 23:00) (67 - 79)  BP: 154/72 (02-03-20 @ 23:00) (122/60 - 157/70)  BP(mean): 103 (02-03-20 @ 23:00) (67 - 116)    Exam: regular rate and rhythm  Cardiac Rhythm: sinus  Perfusion     [x]Adequate   [ ]Inadequate  Mentation   [x]Normal       [ ]Reduced  Extremities  [x]Warm         [ ]Cool  Volume Status [ ]Hypervolemic [x]Euvolemic [ ]Hypovolemic  Meds:       GI/NUTRITION  Exam: soft, nontender, nondistended, incision C/D/I  Diet: CLD  Meds: pantoprazole  Injectable 40 milliGRAM(s) IV Push daily      GENITOURINARY  I&O's Detail    02-02 @ 07:01  -  02-03 @ 07:00  --------------------------------------------------------  IN:    dextrose 5% + lactated ringers.: 225 mL    dextrose 5% + sodium chloride 0.45%: 1575 mL    IV PiggyBack: 300 mL    Solution: 300 mL  Total IN: 2400 mL    OUT:    Indwelling Catheter - Urethral: 300 mL    Voided: 1200 mL  Total OUT: 1500 mL    Total NET: 900 mL      02-03 @ 07:01  -  02-04 @ 00:30  --------------------------------------------------------  IN:    dextrose 5% + sodium chloride 0.45%: 1200 mL    IV PiggyBack: 225 mL    Oral Fluid: 120 mL    Solution: 200 mL  Total IN: 1745 mL    OUT:    Voided: 500 mL  Total OUT: 500 mL    Total NET: 1245 mL      02-03    136  |  103  |  40<H>  ----------------------------<  107<H>  4.0   |  18<L>  |  1.58<H>    Ca    8.6      03 Feb 2020 00:31  Phos  3.4     02-03  Mg     2.1     02-03    [ ] Akins catheter, indication: N/A  Meds: dextrose 5% + sodium chloride 0.45% 1000 milliLiter(s) IV Continuous <Continuous>      HEMATOLOGIC  Meds: enoxaparin Injectable 30 milliGRAM(s) SubCutaneous daily    [x] VTE Prophylaxis                        8.8    12.34 )-----------( 201      ( 03 Feb 2020 00:31 )             27.0     Transfusion     [ ] PRBC   [ ] Platelets   [ ] FFP   [ ] Cryoprecipitate      INFECTIOUS DISEASES  WBC Count: 12.34 K/uL (02-03 @ 00:31)    RECENT CULTURES:  Specimen Source: .Body Fluid peritoneal fluid  Date/Time: 01-31 @ 09:20  Culture Results:   Testing in progress  Gram Stain:   Numerous polymorphonuclear leukocytes per low power field  Numerous Gram positive cocci in pairs per oil power field  Few Gram Positive Rods per oil power field  Rare Gram Negative Rods per oil power field  Organism: Morganella morganii  Enterococcus faecalis  Specimen Source: .Blood Blood-Peripheral  Date/Time: 01-30 @ 18:54  Culture Results:   No growth to date.    ENDOCRINE  CAPILLARY BLOOD GLUCOSE    ACCESS DEVICES:  [x] Peripheral IV  [ ] Central Venous Line	[ ] R	[ ] L	[ ] IJ	[ ] Fem	[ ] SC	Placed:   [ ] Arterial Line		[ ] R	[ ] L	[ ] Fem	[ ] Rad	[ ] Ax	Placed:   [ ] PICC:					[ ] Mediport  [ ] Urinary Catheter, Date Placed:   [x] Necessity of urinary, arterial, and venous catheters discussed    OTHER MEDICATIONS:  benzocaine 15 mG/menthol 3.6 mG (Sugar-Free) Lozenge 1 Lozenge Oral every 4 hours PRN      CODE STATUS: full code

## 2020-02-05 ENCOUNTER — TRANSCRIPTION ENCOUNTER (OUTPATIENT)
Age: 75
End: 2020-02-05

## 2020-02-05 LAB
ANION GAP SERPL CALC-SCNC: 10 MMOL/L — SIGNIFICANT CHANGE UP (ref 5–17)
BUN SERPL-MCNC: 23 MG/DL — SIGNIFICANT CHANGE UP (ref 7–23)
CALCIUM SERPL-MCNC: 8.1 MG/DL — LOW (ref 8.4–10.5)
CHLORIDE SERPL-SCNC: 104 MMOL/L — SIGNIFICANT CHANGE UP (ref 96–108)
CO2 SERPL-SCNC: 23 MMOL/L — SIGNIFICANT CHANGE UP (ref 22–31)
CREAT SERPL-MCNC: 1.08 MG/DL — SIGNIFICANT CHANGE UP (ref 0.5–1.3)
CULTURE RESULTS: SIGNIFICANT CHANGE UP
GLUCOSE SERPL-MCNC: 90 MG/DL — SIGNIFICANT CHANGE UP (ref 70–99)
HCT VFR BLD CALC: 27.6 % — LOW (ref 34.5–45)
HGB BLD-MCNC: 8.5 G/DL — LOW (ref 11.5–15.5)
MAGNESIUM SERPL-MCNC: 1.9 MG/DL — SIGNIFICANT CHANGE UP (ref 1.6–2.6)
MCHC RBC-ENTMCNC: 28.1 PG — SIGNIFICANT CHANGE UP (ref 27–34)
MCHC RBC-ENTMCNC: 30.8 GM/DL — LOW (ref 32–36)
MCV RBC AUTO: 91.4 FL — SIGNIFICANT CHANGE UP (ref 80–100)
NRBC # BLD: 0 /100 WBCS — SIGNIFICANT CHANGE UP (ref 0–0)
ORGANISM # SPEC MICROSCOPIC CNT: SIGNIFICANT CHANGE UP
PHOSPHATE SERPL-MCNC: 2.5 MG/DL — SIGNIFICANT CHANGE UP (ref 2.5–4.5)
PLATELET # BLD AUTO: 248 K/UL — SIGNIFICANT CHANGE UP (ref 150–400)
POTASSIUM SERPL-MCNC: 4.2 MMOL/L — SIGNIFICANT CHANGE UP (ref 3.5–5.3)
POTASSIUM SERPL-SCNC: 4.2 MMOL/L — SIGNIFICANT CHANGE UP (ref 3.5–5.3)
RBC # BLD: 3.02 M/UL — LOW (ref 3.8–5.2)
RBC # FLD: 15.9 % — HIGH (ref 10.3–14.5)
SODIUM SERPL-SCNC: 137 MMOL/L — SIGNIFICANT CHANGE UP (ref 135–145)
SPECIMEN SOURCE: SIGNIFICANT CHANGE UP
WBC # BLD: 12.16 K/UL — HIGH (ref 3.8–10.5)
WBC # FLD AUTO: 12.16 K/UL — HIGH (ref 3.8–10.5)

## 2020-02-05 RX ORDER — POLYETHYLENE GLYCOL 3350 17 G/17G
17 POWDER, FOR SOLUTION ORAL DAILY
Refills: 0 | Status: DISCONTINUED | OUTPATIENT
Start: 2020-02-05 | End: 2020-02-11

## 2020-02-05 RX ADMIN — ENOXAPARIN SODIUM 40 MILLIGRAM(S): 100 INJECTION SUBCUTANEOUS at 10:55

## 2020-02-05 RX ADMIN — OXYCODONE HYDROCHLORIDE 5 MILLIGRAM(S): 5 TABLET ORAL at 14:47

## 2020-02-05 RX ADMIN — OXYCODONE HYDROCHLORIDE 5 MILLIGRAM(S): 5 TABLET ORAL at 10:54

## 2020-02-05 RX ADMIN — PANTOPRAZOLE SODIUM 40 MILLIGRAM(S): 20 TABLET, DELAYED RELEASE ORAL at 05:27

## 2020-02-05 RX ADMIN — OXYCODONE HYDROCHLORIDE 5 MILLIGRAM(S): 5 TABLET ORAL at 20:06

## 2020-02-05 RX ADMIN — OXYCODONE HYDROCHLORIDE 5 MILLIGRAM(S): 5 TABLET ORAL at 15:17

## 2020-02-05 RX ADMIN — OXYCODONE HYDROCHLORIDE 5 MILLIGRAM(S): 5 TABLET ORAL at 11:24

## 2020-02-05 RX ADMIN — OXYCODONE HYDROCHLORIDE 5 MILLIGRAM(S): 5 TABLET ORAL at 00:28

## 2020-02-05 RX ADMIN — SERTRALINE 150 MILLIGRAM(S): 25 TABLET, FILM COATED ORAL at 10:55

## 2020-02-05 RX ADMIN — OXYCODONE HYDROCHLORIDE 5 MILLIGRAM(S): 5 TABLET ORAL at 20:36

## 2020-02-05 NOTE — DISCHARGE NOTE PROVIDER - CARE PROVIDER_API CALL
Shahzad Pierre)  Surgery; Surgical Critical Care  [address] [facility], [address], [place] [id number]  Phone: [phone]  Fax: [phone]  Follow Up Time: 1 week    Mack Ellis)  Orthopaedic Surgery  [address] [place] [address]  Phone: [phone]  Fax: [phone]  Follow Up Time: 1 week Sahhzad Pierre)  Surgery; Surgical Critical Care  [address] [facility], [address], [place] [address]  Phone: [phone]  Fax: [phone]  Follow Up Time: 1 week    Mack Ellis)  Orthopaedic Surgery  [address] [place] [address]  Phone: [phone]  Fax: [phone]  Follow Up Time: 1 week    Denny Mello)  Gastroenterology; Internal Medicine  Division of Gastroenterology  [address] [place] [address]  Phone: [phone]  Fax: [phone]  Follow Up Time:

## 2020-02-05 NOTE — DISCHARGE NOTE PROVIDER - NSDCCPTREATMENT_GEN_ALL_CORE_FT
PRINCIPAL PROCEDURE  Procedure: Colectomy with end colostomy  Findings and Treatment:       SECONDARY PROCEDURE  Procedure: Open resection, sigmoid colon  Findings and Treatment:     Procedure: Left oophorectomy  Findings and Treatment:

## 2020-02-05 NOTE — DISCHARGE NOTE PROVIDER - CARE PROVIDERS DIRECT ADDRESSES
,[email or web address]_Unknown [email or web address],DirectAddress_Unknown,[email or web address]

## 2020-02-05 NOTE — DISCHARGE NOTE PROVIDER - NSDCFUADDINST_GEN_ALL_CORE_FT
Please follow up with your orthopedic surgeon for the recent knee replacement surgery. Discussed incidental finding of cystic lesions in both ovaries. Given copy of CT scan and told to follow up with outpatient gynecologist.    Please follow up with your orthopedic surgeon for the recent knee replacement surgery. Discussed incidental finding of cystic lesions in both ovaries. Given copy of CT scan and told to follow up with outpatient gynecologist.    Please follow up with your orthopedic surgeon for the recent knee replacement surgery.    Please take a stool softener (senna, colace, or miralax) while taking narcotic pain medication to avoid opioid-induced constipation.

## 2020-02-05 NOTE — PROVIDER CONTACT NOTE (OTHER) - SITUATION
POCT BG of 138 showing in results section for pt Jae at 6684 - no blood glucose testing ever performed on this pt

## 2020-02-05 NOTE — DISCHARGE NOTE PROVIDER - HOSPITAL COURSE
74F w/o PMH/ Diverticulosis Anxiety, [place], Spondylolisthesis, Spinal Stenosis, Osteopenia, S/P cataract extraction and insertion of intraocular lens, S/P hammer toe correction, S/P shoulder surgery, s/p carpal tunnel repair s/p Rt TKR 15 years ago and s/p Lt TKR 1/24/2020 presented w/4 days of increasing abdominal pain and distension. After her Lt TKR the pt was discharged home on POD#2 with Xarel to10mg daily for 12 days of DVT ppx and oxycodone for pain control. On the Monday morning prior to admission (POD#3 from Lt TKR) she developed severe achy abdominal pain and distension, worst in the LLQ. She thought it was constipation in the setting of taking narcotic pain medication for her knee replacement. On Monday, she realized that she had not passed a bowel movement since prior to the surgery and started taking Miralax on Tuesday (POD#4).  Pt successfully had a bowel movement on Tuesday after the Miralax, but the pain was not much improved. Over the next two days, the pain persisted and the distension worsened.   Pt noted that on am of admission (1/30- POD#6) she had a small BM.  On admission she denied fevers/chills, nausea & vomiting, but had poor appetite and has had limited PO intake in the days prior. She had been able to tolerate home PT and has been carrying out her normal ADLs despite the abdominal pain.  On the day of admission she presented to her PMD c/o the abdominal pain. The PMD noted a low WBC and anemia and recommend iron supplementation and for her to have a CT of the abdomen and pelvis. She had the CTAP at an outside facility and was called by her PMD shortly thereafter to present to the ED for concerns of "free air" in her abdomen.   In the ED, she was initially tachy to 108 but has improved to a HR in the 90s, her BP had been within normal limits, and she was given 1L NS and a dose of Zosyn.  On admission her lab work is significant for WBC 11, anemia, a bicarb of 17 and lactate of 2.1 INR 2.1 and an HARJIT to 2.22, an elevated bilirubin to 1.3 and alkphos of 129.   Pt was admitted to General Surgery w/ dx of pneumoperitoneum concerning for perforated bowel likely from diverticuli.  She was made NPO, started on IVF, and continued on Abx as per protocol and brought emergently to OR for Ex Lap.   Pt is s/p Ex Lap, Lt oophorectomy,  Colectomy with end colostomy, Resection of sigmoid colon, and Excision of Umbilical Hernia Sac.  IntraOp purulent ascites was noted in lower abdomen with several areas of organizing abscess cavities, all broken up and copiously irrigated.  Severely thickened sigmoid colon with surrounding inflammatory changes suggestive of perforated diverticulitis was noted and decision was made to excise the sigmoid colon w/ distal margin at the peritoneal reflection and proximal margin taken at distal descending colon. A hard mass was noted on the Lt ovary so an oophorectomy was performed. No inflammation was noted in the upper abdomen.  An end colostomy was brought up through left abdominal wall.  Midline wound fascia closed with looped PDS and a wound VAC.   Post operatively patient was brought to the SICU for hemodynamic monitoring and further resuscitation.         no signficant medical history who presented to [facility] with perforated diverticulitis. She underwent a Luisana's procedure. No intraoperative events. Her HARJIT continues to resolve and her metabolic acidosis has resolved. Her bicarb drip has been discontinued. No acute events overnight.         PMH/PSH/MEDS/ALL/SH/FH/ROS:  Unchanged from H&P    Vitals/PE/Labs/radiographs:  Reviewed            A/p        Neuro:	Post operative pain    	Continue pain control        CVS:	S/p sepsis    	Hypervolemia improved    	Lasix as needed        Pulm:	Atelectasis    	Continue ISP        GI:	S/p Luisana's procedure    	Return of bowel fcn    	Adv diet        :	Resolving HARJIT    	Replace lytes    	Monitor I's and O's    	    Heme:	Acute blood loss anema    	Monitor H/h        ID:	Perforated diverticulitis    	Resolving sepsis    	Continue zosyn     	F/u cultures        Endo:	Continue glycemic control. 74F w/o PMH/ Diverticulosis Anxiety, [place], Spondylolisthesis, Spinal Stenosis, Osteopenia, S/P cataract extraction and insertion of intraocular lens, S/P hammer toe correction, S/P shoulder surgery, s/p carpal tunnel repair s/p Rt TKR 15 years ago and s/p Lt TKR 1/24/2020 presented w/4 days of increasing abdominal pain and distension. After her Lt TKR the pt was discharged home on POD#2 with Xarel to10mg daily for 12 days of DVT ppx and oxycodone for pain control. On the Monday morning prior to admission (POD#3 from Lt TKR) she developed severe achy abdominal pain and distension, worst in the LLQ. She thought it was constipation in the setting of taking narcotic pain medication for her knee replacement. On Monday, she realized that she had not passed a bowel movement since prior to the surgery and started taking Miralax on Tuesday (POD#4).  Pt successfully had a bowel movement on Tuesday after the Miralax, but the pain was not much improved. Over the next two days, the pain persisted and the distension worsened.   Pt noted that on am of admission (1/30- POD#6) she had a small BM.  On admission she denied fevers/chills, nausea & vomiting, but had poor appetite and has had limited PO intake in the days prior. She had been able to tolerate home PT and has been carrying out her normal ADLs despite the abdominal pain.  On the day of admission she presented to her PMD c/o the abdominal pain. The PMD noted a low WBC and anemia and recommend iron supplementation and for her to have a CT of the abdomen and pelvis. She had the CTAP at an outside facility and was called by her PMD shortly thereafter to present to the ED for concerns of "free air" in her abdomen.   In the ED, she was initially tachy to 108 but has improved to a HR in the 90s, her BP had been within normal limits, and she was given 1L NS and a dose of Zosyn.  On admission her lab work is significant for WBC 11, anemia, a bicarb of 17 and lactate of 2.1 INR 2.1 and an HARJIT to 2.22, an elevated bilirubin to 1.3 and alkphos of 129.   Pt was admitted to General Surgery w/ dx of pneumoperitoneum concerning for perforated bowel likely from diverticuli.  She was made NPO, started on IVF, and continued on Abx as per protocol and brought emergently to OR for Ex Lap.   Pt is s/p Ex Lap, Lt oophorectomy,  Colectomy with end colostomy, Resection of sigmoid colon, and Excision of Umbilical Hernia Sac.  IntraOp purulent ascites was noted in lower abdomen with several areas of organizing abscess cavities, all broken up and copiously irrigated.  Severely thickened sigmoid colon with surrounding inflammatory changes suggestive of perforated diverticulitis was noted and decision was made to excise the sigmoid colon w/ distal margin at the peritoneal reflection and proximal margin taken at distal descending colon. A hard mass was noted on the Lt ovary so an oophorectomy was performed. No inflammation was noted in the upper abdomen.  An end colostomy was brought up through left abdominal wall.  Midline wound fascia closed with looped PDS and a wound VAC.   Post operatively patient was brought to the SICU for hemodynamic monitoring and further resuscitation.  Vanco was added to Zosyn for broad spectrum antibiotic coverage.  When cultures came back as GNR, PCN sensitive, Vanco was stopped.  Cultures came back as E faecalis.  Pt's acute respiratory failure improved and pt was extubated and subsequently improved to be saturating well on room air.  Pt noted with a metabolic acidosis & lactic acidosis requiring Bicarb drip to help resolve.  Pt's HARJIT on CKD with oliguria improved with hydration and improved overall condition.  Pt noted with hypoK & hypoMg and was repleted.  Pt's post op monitoring required treatment of sepsis. She required diuresis of hypervolemia with Lasix. Pulmonary toilet & Incentive spirometry education and use was enforced to treat noted atelectasis  Pt's pain improved with transition from IV pain medication to oral medication.  Pt was seen by Orthopedics prior to ambulation.  Lt knee incision healing and WBAT with follow up as outpatient with operative surgeon. Pt clinically improved and regained bowel function.  Pt transferred out of SICU when noted to be hemodynamically stable, sat'ing well on RA and with resolution of metabolic acidosis, sepsis, and improving HARJIT on CKD.   Pt requiring VAC for healing of midline incision.  Wound noted to be granulating without signs of infection.        Pt is now afebrile, hemodynamically stable, ambulating, and with return of bowel and bladder function.  Pt will require home PT for continued rehab s/p Lt TKR.  Pt will also require VNS for ostomy teaching and continuation of VAC therapy.  Pt is accepting of discharge and understands necessary aftercare. Pt to follow with surgery, her PMD and orthopedic surgeon. 74F w/o PMH/ Diverticulosis Anxiety, [place], Spondylolisthesis, Spinal Stenosis, Osteopenia, S/P cataract extraction and insertion of intraocular lens, S/P hammer toe correction, S/P shoulder surgery, s/p carpal tunnel repair s/p Rt TKR 15 years ago and s/p Lt TKR 1/24/2020 presented w/4 days of increasing abdominal pain and distension. After her Lt TKR the pt was discharged home on POD#2 with Xarel to10mg daily for 12 days of DVT ppx and oxycodone for pain control. On the Monday morning prior to admission (POD#3 from Lt TKR) she developed severe achy abdominal pain and distension, worst in the LLQ. She thought it was constipation in the setting of taking narcotic pain medication for her knee replacement. On Monday, she realized that she had not passed a bowel movement since prior to the surgery and started taking Miralax on Tuesday (POD#4).  Pt successfully had a bowel movement on Tuesday after the Miralax, but the pain was not much improved. Over the next two days, the pain persisted and the distension worsened.   Pt noted that on am of admission (1/30- POD#6) she had a small BM.  On admission she denied fevers/chills, nausea & vomiting, but had poor appetite and has had limited PO intake in the days prior. She had been able to tolerate home PT and has been carrying out her normal ADLs despite the abdominal pain.  On the day of admission she presented to her PMD c/o the abdominal pain. The PMD noted a low WBC and anemia and recommend iron supplementation and for her to have a CT of the abdomen and pelvis. She had the CTAP at an outside facility and was called by her PMD shortly thereafter to present to the ED for concerns of "free air" in her abdomen.   In the ED, she was initially tachy to 108 but has improved to a HR in the 90s, her BP had been within normal limits, and she was given 1L NS and a dose of Zosyn.  On admission her lab work is significant for WBC 11, anemia, a bicarb of 17 and lactate of 2.1 INR 2.1 and an HARJIT to 2.22, an elevated bilirubin to 1.3 and alkphos of 129.   Pt was admitted to General Surgery w/ dx of pneumoperitoneum concerning for perforated bowel likely from diverticuli.  She was made NPO, started on IVF, and continued on Abx as per protocol and brought emergently to OR for Ex Lap.   Pt is s/p Ex Lap, Lt oophorectomy,  Colectomy with end colostomy, Resection of sigmoid colon, and Excision of Umbilical Hernia Sac.  IntraOp purulent ascites was noted in lower abdomen with several areas of organizing abscess cavities, all broken up and copiously irrigated.  Severely thickened sigmoid colon with surrounding inflammatory changes suggestive of perforated diverticulitis was noted and decision was made to excise the sigmoid colon w/ distal margin at the peritoneal reflection and proximal margin taken at distal descending colon. A hard mass was noted on the Lt ovary so an oophorectomy was performed. No inflammation was noted in the upper abdomen.  An end colostomy was brought up through left abdominal wall.  Midline wound fascia closed with looped PDS and a wound VAC.   Post operatively patient was brought to the SICU for hemodynamic monitoring and further resuscitation.  Vanco was added to Zosyn for broad spectrum antibiotic coverage.  When cultures came back as GNR, PCN sensitive, Vanco was stopped.  Cultures came back as Morganella morganii and E faecalis nd completed a 4 day course of Zosyn.  Pt's acute respiratory failure improved and pt was extubated and subsequently improved to be saturating well on room air.  Pt noted with a metabolic acidosis & lactic acidosis requiring Bicarb drip to help resolve.  Pt's HARJIT on CKD with oliguria improved with hydration and improved overall condition.  Pt noted with hypoK & hypoMg and was repleted.  Pt's post op monitoring required treatment of sepsis. She required diuresis of hypervolemia with Lasix. Pulmonary toilet & Incentive spirometry education and use was enforced to treat noted atelectasis  Pt's pain improved with transition from IV pain medication to oral medication.  Pt was seen by Orthopedics prior to ambulation.  Lt knee incision healing and WBAT with follow up as outpatient with operative surgeon. Pt clinically improved and regained bowel function.  Pt transferred out of SICU when noted to be hemodynamically stable, sat'ing well on RA and with resolution of metabolic acidosis, sepsis, and improving HARJIT on CKD.   Pt requiring VAC for healing of midline incision.  Wound noted to be granulating without signs of infection.        Pt is now afebrile, hemodynamically stable, ambulating, and with return of bowel and bladder function.  Pt will require home PT for continued rehab s/p Lt TKR.  Pt will also require VNS for ostomy teaching and daily wound care dressing changes. Pt is accepting of discharge and understands necessary aftercare. Pt to follow with surgery, her PMD and orthopedic surgeon. 74F who had undergone a left total knee replacement on 1/24/2020. Her postoperative course was complicated by constipation and she was sent to [facility] after undergoing a CT scan of the abdomen and pelvis which demonstrated pneumoperitoneum which appeared to be originating from the sigmoid colon. She had sepsis with acute renal failure. On exam, she had diffuse peritonitis.        On 1/30, she underwent emergent Luisana's procedure. An left oophorectomy was performed for an incidental left ovarian mass. The wound was left open and a VAC was placed.         On 1/31, she was extubated.        ON 2/3, antibiotics (vancomycin and Zosyn) were stopped. Peritoneal cultures grew Morganella morganii, Enterococcus faecalis, Streptococcus anginosus, and Bacteroides thetaiotaomicron.        On 2/4, acute renal failure resolved, her colostomy began to function, she was advanced to a regular diet, and she was transferred to the floor.        On 2/5, she requested removal of the wound VAC from the laparotomy wound and preferred wet-to-dry dressings. The wound was partially granulated.        She is stable for discharge with continued home PT for her recent left total knee replacement. S is also set up for colostomy teaching and wound care. 74F who had undergone a left total knee replacement on 1/24/2020. Her postoperative course was complicated by constipation and she was sent to [facility] after undergoing a CT scan of the abdomen and pelvis w/o contrast at [facility]. The CT scan demonstrated pneumoperitoneum which appeared to be originating from the sigmoid colon. She had sepsis with acute renal failure. On exam, she had diffuse peritonitis.        On 1/30, she underwent emergent Luisana's procedure for perforated diverticulitis with a pelvic abscess and diffuse purulent peritonitis. An left oophorectomy was performed for an incidental left ovarian mass. The wound was left open and a VAC was placed.         On 1/31, she was extubated.        ON 2/3, antibiotics (vancomycin and Zosyn) were stopped. Peritoneal cultures grew Morganella morganii, Enterococcus faecalis, Streptococcus anginosus, and Bacteroides thetaiotaomicron.        On 2/4, acute renal failure resolved, her colostomy began to function, she was advanced to a regular diet, and she was transferred to the floor.        On 2/5, she requested removal of the wound VAC from the laparotomy wound and preferred wet-to-dry dressings. The wound was partially granulated.        She is stable for discharge with continued home PT for her recent left total knee replacement. S is also set up for colostomy teaching and wound care. 74F who had undergone a left total knee replacement on 1/24/2020. Her postoperative course was complicated by constipation and she was sent to [facility] after undergoing a CT scan of the abdomen and pelvis w/o contrast at [facility]. The CT scan demonstrated pneumoperitoneum which appeared to be originating from the sigmoid colon. She had sepsis with acute renal failure. On exam, she had diffuse peritonitis.        On 1/30, she underwent emergent Luisana's procedure for perforated diverticulitis with a pelvic abscess and diffuse purulent peritonitis. An left oophorectomy was performed for an incidental left ovarian mass. The wound was left open and a VAC was placed.         On 1/31, she was extubated.        ON 2/3, antibiotics (vancomycin and Zosyn) were stopped. Peritoneal cultures grew Morganella morganii, Enterococcus faecalis, Streptococcus anginosus, and Bacteroides thetaiotaomicron.        On 2/4, acute renal failure resolved, her colostomy began to function, she was advanced to a regular diet, and she was transferred to the floor.        On 2/5, she requested removal of the wound VAC from the laparotomy wound and preferred wet-to-dry dressings. The wound was partially granulated.    On 2/6, pt had increased WBC- pending US Abdomen, hepatic panel, and RVP for source -Discussed incidental finding of cystic lesions in both ovaries. Given copy of CT scan and told to follow up with her outpatient gynecologist. 2/8 HIDA negative for acute jatin.  GI was consulted 2/9, rec: endoscopy vs imaging. MRCP showed sludge and slightened thickening.  No further workup required. L foot/ankle xray negative. On 2/11, wound vac dc.   She is stable for discharge with continued home PT for her recent left total knee replacement. VNS is also set up for colostomy teaching and wound care.

## 2020-02-05 NOTE — PROGRESS NOTE ADULT - ASSESSMENT
74F with no PMHx and PSHx of R TKR 15 years ago and left TKR this past Friday (1/24) at [place] presenting with 4 days of abdominal pain and distension in the setting of constipation and a history of diverticulosis. Found to have significant pneumoperitoneum on CTAP concerning for perforated bowel, although the source of the perforation remains unclear, it is likely from diverticuli. Now s/p exploratory laparotomy, Salas procedure and left oophorectomy (1/30).     - Pain control  - DVT ppx  - OOB/ ambulate  - regular diet   - Gas and stool in ostomy bag  - Wound VAC change today  - Dispo planning      [id number]  ACS

## 2020-02-05 NOTE — DISCHARGE NOTE PROVIDER - NSDCMRMEDTOKEN_GEN_ALL_CORE_FT
calcium carbonate: 1 tab(s) orally once a day  celecoxib 200 mg oral capsule: 1 cap(s) orally once a day MDD:1  Fish Oil: 1  orally once a day  omeprazole 20 mg oral delayed release tablet: 1 tab(s) orally once a day  oxyCODONE 5 mg oral tablet: 1-2 tabs orally every 4 hours as needed for pain. 1tab for pain 3-6, 2tabs for pain 7-10 MDD:8 tablets  oxyCODONE 5 mg oral tablet: 1 tab(s) orally every 4 hours  rivaroxaban 10 mg oral tablet: 1 tab(s) orally once a day MDD:1 tablet  senna oral tablet: 2 tab(s) orally once a day (at bedtime), As needed, Constipation  Vitamin D2: 1 tab(s) orally once a day  Xarelto 10 mg oral tablet: 1 tab(s) orally once a day  Zoloft: 150 milligram(s) orally once a day calcium carbonate: 1 tab(s) orally once a day  celecoxib 200 mg oral capsule: 1 cap(s) orally once a day MDD:1  Fish Oil: 1  orally once a day  omeprazole 20 mg oral delayed release tablet: 1 tab(s) orally once a day  oxyCODONE 5 mg oral tablet: 1-2 tabs orally every 4 hours as needed for pain. 1tab for pain 3-6, 2tabs for pain 7-10 MDD:8 tablets  oxyCODONE 5 mg oral tablet: 1 tab(s) orally every 4 hours  polyethylene glycol 3350 oral powder for reconstitution: 17 gram(s) orally once a day, As needed, Constipation  rivaroxaban 10 mg oral tablet: 1 tab(s) orally once a day MDD:1 tablet  senna oral tablet: 2 tab(s) orally once a day (at bedtime), As needed, Constipation  Vitamin D2: 1 tab(s) orally once a day  Xarelto 10 mg oral tablet: 1 tab(s) orally once a day  Zoloft: 150 milligram(s) orally once a day acetaminophen 325 mg oral tablet: 3 tab(s) orally every 6 hours, As needed, Mild Pain (1 - 3)  calcium carbonate: 1 tab(s) orally once a day  Fish Oil: 1  orally once a day  omeprazole 20 mg oral delayed release tablet: 1 tab(s) orally once a day  oxyCODONE 5 mg oral tablet: 1 tab(s) orally every 6 hours, As Needed -Moderate Pain (4 - 6) MDD:4   polyethylene glycol 3350 oral powder for reconstitution: 17 gram(s) orally once a day, As needed, Constipation  senna oral tablet: 2 tab(s) orally once a day (at bedtime), As needed, Constipation  Vitamin D2: 1 tab(s) orally once a day  Zoloft: 150 milligram(s) orally once a day

## 2020-02-05 NOTE — PROGRESS NOTE ADULT - SUBJECTIVE AND OBJECTIVE BOX
SURGERY DAILY PROGRESS NOTE:       SUBJECTIVE/ROS: Patient examined at bedside. Stool and gas from ostomy. Tolerating regular diet   Denies nausea, vomiting, chest pain, shortness of breath         Vital Signs:  Vital Signs Last 24 Hrs  T(C): 36.8 (2020 05:27), Max: 37 (2020 15:00)  T(F): 98.3 (2020 05:27), Max: 98.6 (2020 15:00)  HR: 78 (2020 05:27) (69 - 80)  BP: 126/78 (2020 05:27) (126/78 - 154/102)  BP(mean): 106 (2020 16:00) (92 - 116)  RR: 18 (2020 05:27) (18 - 39)  SpO2: 94% (2020 05:27) (91% - 99%)    CAPILLARY BLOOD GLUCOSE              PHYSICAL EXAM:  General: NAD  HEENT: NC/AT  Pulmonary: Breathing comfortably on RA  Cardiovascular: NSR, no murmurs  Abdominal: soft, non distended, some epigastric tenderness, ostomy pink with gas and stool in the ostomy bag, midline wound vac in place with good suction  Extremities: Knee incision c/d/i              POCT Blood Glucose.: 138 mg/dL (2020 23:34)      I&O's Detail    2020 07:01  -  2020 07:00  --------------------------------------------------------  IN:    IV PiggyBack: 62.5 mL    lactated ringers.: 150 mL    Oral Fluid: 960 mL  Total IN: 1172.5 mL    OUT:    Colostomy: 295 mL    Incontinent per Collection Ba mL    Ureteral Catheter: 250 mL    Voided: 820 mL  Total OUT: 1665 mL    Total NET: -492.5 mL          MEDICATIONS  (STANDING):  enoxaparin Injectable 40 milliGRAM(s) SubCutaneous daily  pantoprazole    Tablet 40 milliGRAM(s) Oral before breakfast  sertraline 150 milliGRAM(s) Oral daily    MEDICATIONS  (PRN):  acetaminophen   Tablet .. 975 milliGRAM(s) Oral every 6 hours PRN Mild Pain (1 - 3)  benzocaine 15 mG/menthol 3.6 mG (Sugar-Free) Lozenge 1 Lozenge Oral every 4 hours PRN Sore Throat  oxyCODONE    IR 5 milliGRAM(s) Oral every 4 hours PRN Moderate Pain (4 - 6)  oxyCODONE    IR 10 milliGRAM(s) Oral every 6 hours PRN Severe Pain (7 - 10)  polyethylene glycol 3350 17 Gram(s) Oral daily PRN Constipation          Labs:        137  |  104  |  23  ----------------------------<  90  4.2   |  23  |  1.08    Ca    8.1<L>      2020 06:50  Phos  2.5     02-05  Mg     1.9     -                              8.5    12.16 )-----------( 248      ( 2020 06:50 )             27.6         Imaging:

## 2020-02-05 NOTE — DISCHARGE NOTE PROVIDER - PROVIDER TOKENS
PROVIDER:[TOKEN:[[id number]],FOLLOWUP:[1 week]],PROVIDER:[TOKEN:[[id number]],FOLLOWUP:[1 week]] PROVIDER:[TOKEN:[[id number]],FOLLOWUP:[1 week]],PROVIDER:[TOKEN:[[id number]],FOLLOWUP:[1 week]],PROVIDER:[TOKEN:[[id number]]]

## 2020-02-05 NOTE — DISCHARGE NOTE PROVIDER - NSDCACTIVITY_GEN_ALL_CORE
Do not drive or operate machinery/do not drive or operate machinery while taking pain medications/Walking - Outdoors allowed/Showering allowed/Walking - Indoors allowed/No heavy lifting/straining

## 2020-02-05 NOTE — PROVIDER CONTACT NOTE (OTHER) - RECOMMENDATIONS
Spoke to Ayo in lab, was directed to leave message w/ Point of Care Technician at ext [phone] - message left, will f/u in AM
Assess at bedsude
Pt. examined and abdominal x ray ordered. Have pt. remain in bed until xray done.

## 2020-02-05 NOTE — DISCHARGE NOTE PROVIDER - NSDCCPCAREPLAN_GEN_ALL_CORE_FT
PRINCIPAL DISCHARGE DIAGNOSIS  Diagnosis: Perforated diverticulum  Assessment and Plan of Treatment: PRINCIPAL DISCHARGE DIAGNOSIS  Diagnosis: Perforated diverticulum  Assessment and Plan of Treatment: Recovering from surgery   WOUND CARE: midline dressing- wet to dry gauze, cover with dry gauze and paper tape   BATHING: Please do not submerge wound underwater. You may shower and/or sponge bathe.  ACTIVITY: No heavy lifting or straining. Otherwise, you may return to your usual level of physical activity. If you are taking narcotic pain medication (such as Percocet), do NOT drive a car, operate machinery or make important decisions.  DIET: Return to your usual diet.  NOTIFY YOUR SURGEON IF: You have any bleeding that does not stop, any pus draining from your wound, any fever (over 100.4 F) or chills, persistent nausea/vomiting, persistent diarrhea, or if your pain is not controlled on your discharge pain medications.  FOLLOW-UP:  1. Please call to make a follow-up appointment within one week of discharge   2. Please follow up with your primary care physician in one week regarding your hospitalization. PRINCIPAL DISCHARGE DIAGNOSIS  Diagnosis: Perforated diverticulum  Assessment and Plan of Treatment: Recovering from surgery   WOUND CARE: midline dressing- wet to dry gauze, cover with dry gauze and paper tape   BATHING: Please do not submerge wound underwater. You may shower and/or sponge bathe.  ACTIVITY: No heavy lifting or straining. Otherwise, you may return to your usual level of physical activity. If you are taking narcotic pain medication (such as Percocet), do NOT drive a car, operate machinery or make important decisions.  DIET: Return to your usual diet.  NOTIFY YOUR SURGEON IF: You have any bleeding that does not stop, any pus draining from your wound, any fever (over 100.4 F) or chills, persistent nausea/vomiting, persistent diarrhea, or if your pain is not controlled on your discharge pain medications.  FOLLOW-UP:  1. Please call to make a follow-up appointment within one week of discharge   2. Please follow up with your primary care physician in one week regarding your hospitalization.         SECONDARY DISCHARGE DIAGNOSES  Diagnosis: Under care of ostomy nurse  Assessment and Plan of Treatment: Your output should be no less than 400ml and no more than 1200ml.  If your output goes out of this range, please notify your surgeon.  Notify your surgeon and return to ER for temperatures greater than 101, chills sweats, pain not controlled with pain medications, persistent nausea and vomiting, inability to tolerate food, significant abdominal bloating/distension, no passing of flatus or bowel movements, or acutely concerning matters to you, that may require urgent medical attention.

## 2020-02-06 ENCOUNTER — TRANSCRIPTION ENCOUNTER (OUTPATIENT)
Age: 75
End: 2020-02-06

## 2020-02-06 LAB
ANION GAP SERPL CALC-SCNC: 10 MMOL/L — SIGNIFICANT CHANGE UP (ref 5–17)
APPEARANCE UR: CLEAR — SIGNIFICANT CHANGE UP
BILIRUB UR-MCNC: NEGATIVE — SIGNIFICANT CHANGE UP
BUN SERPL-MCNC: 22 MG/DL — SIGNIFICANT CHANGE UP (ref 7–23)
CALCIUM SERPL-MCNC: 8.5 MG/DL — SIGNIFICANT CHANGE UP (ref 8.4–10.5)
CHLORIDE SERPL-SCNC: 102 MMOL/L — SIGNIFICANT CHANGE UP (ref 96–108)
CO2 SERPL-SCNC: 24 MMOL/L — SIGNIFICANT CHANGE UP (ref 22–31)
COLOR SPEC: YELLOW — SIGNIFICANT CHANGE UP
CREAT SERPL-MCNC: 1.08 MG/DL — SIGNIFICANT CHANGE UP (ref 0.5–1.3)
DIFF PNL FLD: NEGATIVE — SIGNIFICANT CHANGE UP
GLUCOSE SERPL-MCNC: 94 MG/DL — SIGNIFICANT CHANGE UP (ref 70–99)
GLUCOSE UR QL: ABNORMAL
HCT VFR BLD CALC: 29.3 % — LOW (ref 34.5–45)
HGB BLD-MCNC: 8.9 G/DL — LOW (ref 11.5–15.5)
KETONES UR-MCNC: NEGATIVE — SIGNIFICANT CHANGE UP
LEUKOCYTE ESTERASE UR-ACNC: NEGATIVE — SIGNIFICANT CHANGE UP
MAGNESIUM SERPL-MCNC: 1.8 MG/DL — SIGNIFICANT CHANGE UP (ref 1.6–2.6)
MCHC RBC-ENTMCNC: 28.3 PG — SIGNIFICANT CHANGE UP (ref 27–34)
MCHC RBC-ENTMCNC: 30.4 GM/DL — LOW (ref 32–36)
MCV RBC AUTO: 93 FL — SIGNIFICANT CHANGE UP (ref 80–100)
NITRITE UR-MCNC: NEGATIVE — SIGNIFICANT CHANGE UP
NRBC # BLD: 0 /100 WBCS — SIGNIFICANT CHANGE UP (ref 0–0)
PH UR: 6.5 — SIGNIFICANT CHANGE UP (ref 5–8)
PHOSPHATE SERPL-MCNC: 2.5 MG/DL — SIGNIFICANT CHANGE UP (ref 2.5–4.5)
PLATELET # BLD AUTO: 320 K/UL — SIGNIFICANT CHANGE UP (ref 150–400)
POTASSIUM SERPL-MCNC: 4 MMOL/L — SIGNIFICANT CHANGE UP (ref 3.5–5.3)
POTASSIUM SERPL-SCNC: 4 MMOL/L — SIGNIFICANT CHANGE UP (ref 3.5–5.3)
PROT UR-MCNC: ABNORMAL
RBC # BLD: 3.15 M/UL — LOW (ref 3.8–5.2)
RBC # FLD: 16.2 % — HIGH (ref 10.3–14.5)
SODIUM SERPL-SCNC: 136 MMOL/L — SIGNIFICANT CHANGE UP (ref 135–145)
SP GR SPEC: 1.05 — HIGH (ref 1.01–1.02)
UROBILINOGEN FLD QL: ABNORMAL
WBC # BLD: 14.35 K/UL — HIGH (ref 3.8–10.5)
WBC # FLD AUTO: 14.35 K/UL — HIGH (ref 3.8–10.5)

## 2020-02-06 PROCEDURE — 74177 CT ABD & PELVIS W/CONTRAST: CPT | Mod: 26

## 2020-02-06 PROCEDURE — 71260 CT THORAX DX C+: CPT | Mod: 26

## 2020-02-06 RX ORDER — SODIUM,POTASSIUM PHOSPHATES 278-250MG
1 POWDER IN PACKET (EA) ORAL
Refills: 0 | Status: COMPLETED | OUTPATIENT
Start: 2020-02-06 | End: 2020-02-07

## 2020-02-06 RX ORDER — MAGNESIUM SULFATE 500 MG/ML
2 VIAL (ML) INJECTION ONCE
Refills: 0 | Status: COMPLETED | OUTPATIENT
Start: 2020-02-06 | End: 2020-02-06

## 2020-02-06 RX ORDER — DIPHENHYDRAMINE HCL 50 MG
12.5 CAPSULE ORAL ONCE
Refills: 0 | Status: COMPLETED | OUTPATIENT
Start: 2020-02-06 | End: 2020-02-06

## 2020-02-06 RX ADMIN — OXYCODONE HYDROCHLORIDE 5 MILLIGRAM(S): 5 TABLET ORAL at 06:55

## 2020-02-06 RX ADMIN — Medication 12.5 MILLIGRAM(S): at 21:49

## 2020-02-06 RX ADMIN — OXYCODONE HYDROCHLORIDE 5 MILLIGRAM(S): 5 TABLET ORAL at 19:02

## 2020-02-06 RX ADMIN — OXYCODONE HYDROCHLORIDE 5 MILLIGRAM(S): 5 TABLET ORAL at 10:50

## 2020-02-06 RX ADMIN — PANTOPRAZOLE SODIUM 40 MILLIGRAM(S): 20 TABLET, DELAYED RELEASE ORAL at 06:23

## 2020-02-06 RX ADMIN — OXYCODONE HYDROCHLORIDE 5 MILLIGRAM(S): 5 TABLET ORAL at 02:36

## 2020-02-06 RX ADMIN — OXYCODONE HYDROCHLORIDE 5 MILLIGRAM(S): 5 TABLET ORAL at 19:14

## 2020-02-06 RX ADMIN — SERTRALINE 150 MILLIGRAM(S): 25 TABLET, FILM COATED ORAL at 12:08

## 2020-02-06 RX ADMIN — OXYCODONE HYDROCHLORIDE 5 MILLIGRAM(S): 5 TABLET ORAL at 11:02

## 2020-02-06 RX ADMIN — OXYCODONE HYDROCHLORIDE 5 MILLIGRAM(S): 5 TABLET ORAL at 15:10

## 2020-02-06 RX ADMIN — OXYCODONE HYDROCHLORIDE 5 MILLIGRAM(S): 5 TABLET ORAL at 06:22

## 2020-02-06 RX ADMIN — ENOXAPARIN SODIUM 40 MILLIGRAM(S): 100 INJECTION SUBCUTANEOUS at 12:06

## 2020-02-06 RX ADMIN — OXYCODONE HYDROCHLORIDE 5 MILLIGRAM(S): 5 TABLET ORAL at 15:01

## 2020-02-06 RX ADMIN — OXYCODONE HYDROCHLORIDE 5 MILLIGRAM(S): 5 TABLET ORAL at 02:06

## 2020-02-06 RX ADMIN — Medication 50 GRAM(S): at 15:01

## 2020-02-06 RX ADMIN — Medication 1 PACKET(S): at 18:29

## 2020-02-06 NOTE — DISCHARGE NOTE NURSING/CASE MANAGEMENT/SOCIAL WORK - NSSCTYPOFSERV_GEN_ALL_CORE
for RN evaluation, Reinforce wound care and ostomy care, MAKE SURE YOU ARE SENT HOME WITH DRESSINGS AND OSTOMY SUPPLIES PLEASE, for HOME PT, Aide evaluation

## 2020-02-06 NOTE — DISCHARGE NOTE NURSING/CASE MANAGEMENT/SOCIAL WORK - PATIENT PORTAL LINK FT
You can access the FollowMyHealth Patient Portal offered by [facility] by registering at the following website: [email or web address]. By joining [employer]’s FollowMyHealth portal, you will also be able to view your health information using other applications (apps) compatible with our system.

## 2020-02-06 NOTE — PROGRESS NOTE ADULT - SUBJECTIVE AND OBJECTIVE BOX
SURGERY DAILY PROGRESS NOTE:       SUBJECTIVE/ROS: Patient seen and evaluated at the bedside on AM rounds. Patient doing well. Gas from ostomy in appliance. Tolerating a regular diet. Denies nausea, vomiting, chest pain and shortness of breath.        OBJECTIVE:    Vital Signs Last 24 Hrs  T(C): 36.8 (2020 06:04), Max: 37.2 (2020 21:55)  T(F): 98.3 (2020 06:04), Max: 98.9 (2020 21:55)  HR: 80 (2020 06:04) (75 - 87)  BP: 117/65 (2020 06:04) (110/71 - 128/78)  BP(mean): --  RR: 18 (2020 06:04) (18 - 18)  SpO2: 96% (2020 06:04) (94% - 97%)  I&O's Detail    2020 07:01  -  2020 07:00  --------------------------------------------------------  IN:    Oral Fluid: 1320 mL  Total IN: 1320 mL    OUT:    Colostomy: 225 mL    Incontinent per Collection Ba mL  Total OUT: 925 mL    Total NET: 395 mL        Daily     Daily Weight in k.8 (2020 10:53)  MEDICATIONS  (STANDING):  enoxaparin Injectable 40 milliGRAM(s) SubCutaneous daily  pantoprazole    Tablet 40 milliGRAM(s) Oral before breakfast  sertraline 150 milliGRAM(s) Oral daily    MEDICATIONS  (PRN):  acetaminophen   Tablet .. 975 milliGRAM(s) Oral every 6 hours PRN Mild Pain (1 - 3)  benzocaine 15 mG/menthol 3.6 mG (Sugar-Free) Lozenge 1 Lozenge Oral every 4 hours PRN Sore Throat  oxyCODONE    IR 5 milliGRAM(s) Oral every 4 hours PRN Moderate Pain (4 - 6)  oxyCODONE    IR 10 milliGRAM(s) Oral every 6 hours PRN Severe Pain (7 - 10)  polyethylene glycol 3350 17 Gram(s) Oral daily PRN Constipation      LABS:                        8.9    14.35 )-----------( 320      ( 2020 06:41 )             29.3     02-06    136  |  102  |  22  ----------------------------<  94  4.0   |  24  |  1.08    Ca    8.5      2020 06:41  Phos  2.5     02-06  Mg     1.8     02-        PHYSICAL EXAM:  General: NAD  HEENT: NC/AT  Pulmonary: Breathing comfortably on RA  Cardiovascular: NSR, no murmurs  Abdominal: soft, non distended, some epigastric tenderness, ostomy pink with gas in the ostomy bag, midline wound vac in place with good suction  Extremities: Knee incision c/d/i

## 2020-02-06 NOTE — PROGRESS NOTE ADULT - ASSESSMENT
74F with no PMHx and PSHx of R TKR 15 years ago and left TKR this past Friday (1/24) at [place] presenting with 4 days of abdominal pain and distension in the setting of constipation and a history of diverticulosis. Found to have significant pneumoperitoneum on CTAP concerning for perforated bowel, although the source of the perforation remains unclear, it is likely from diverticuli. Now s/p exploratory laparotomy, Salas procedure and left oophorectomy (1/30).     - Pain control as needed   - DVT ppx  - OOB/ ambulate  - regular diet   - Gas and stool in ostomy bag  - d/c wound vac prior to discharge, discharge with wet to dry dressing   - Dispo planning: home with home PT    Beatriz Bundy PA-C   [id number]  ACS

## 2020-02-07 LAB
ALBUMIN SERPL ELPH-MCNC: 2.3 G/DL — LOW (ref 3.3–5)
ALP SERPL-CCNC: 134 U/L — HIGH (ref 40–120)
ALT FLD-CCNC: 11 U/L — SIGNIFICANT CHANGE UP (ref 10–45)
ANION GAP SERPL CALC-SCNC: 12 MMOL/L — SIGNIFICANT CHANGE UP (ref 5–17)
AST SERPL-CCNC: 16 U/L — SIGNIFICANT CHANGE UP (ref 10–40)
BILIRUB DIRECT SERPL-MCNC: 0.4 MG/DL — HIGH (ref 0–0.2)
BILIRUB INDIRECT FLD-MCNC: 0.4 MG/DL — SIGNIFICANT CHANGE UP (ref 0.2–1)
BILIRUB SERPL-MCNC: 0.8 MG/DL — SIGNIFICANT CHANGE UP (ref 0.2–1.2)
BUN SERPL-MCNC: 24 MG/DL — HIGH (ref 7–23)
CALCIUM SERPL-MCNC: 8.1 MG/DL — LOW (ref 8.4–10.5)
CHLORIDE SERPL-SCNC: 101 MMOL/L — SIGNIFICANT CHANGE UP (ref 96–108)
CO2 SERPL-SCNC: 22 MMOL/L — SIGNIFICANT CHANGE UP (ref 22–31)
CREAT SERPL-MCNC: 0.95 MG/DL — SIGNIFICANT CHANGE UP (ref 0.5–1.3)
GLUCOSE SERPL-MCNC: 112 MG/DL — HIGH (ref 70–99)
HCT VFR BLD CALC: 31.2 % — LOW (ref 34.5–45)
HGB BLD-MCNC: 9.9 G/DL — LOW (ref 11.5–15.5)
LIDOCAIN IGE QN: 202 U/L — HIGH (ref 7–60)
MCHC RBC-ENTMCNC: 29.6 PG — SIGNIFICANT CHANGE UP (ref 27–34)
MCHC RBC-ENTMCNC: 31.7 GM/DL — LOW (ref 32–36)
MCV RBC AUTO: 93.1 FL — SIGNIFICANT CHANGE UP (ref 80–100)
NRBC # BLD: 0 /100 WBCS — SIGNIFICANT CHANGE UP (ref 0–0)
PLATELET # BLD AUTO: 446 K/UL — HIGH (ref 150–400)
POTASSIUM SERPL-MCNC: 3.8 MMOL/L — SIGNIFICANT CHANGE UP (ref 3.5–5.3)
POTASSIUM SERPL-SCNC: 3.8 MMOL/L — SIGNIFICANT CHANGE UP (ref 3.5–5.3)
PROCALCITONIN SERPL-MCNC: 0.35 NG/ML — HIGH (ref 0.02–0.1)
PROT SERPL-MCNC: 4.9 G/DL — LOW (ref 6–8.3)
RAPID RVP RESULT: SIGNIFICANT CHANGE UP
RBC # BLD: 3.35 M/UL — LOW (ref 3.8–5.2)
RBC # FLD: 16.7 % — HIGH (ref 10.3–14.5)
SODIUM SERPL-SCNC: 135 MMOL/L — SIGNIFICANT CHANGE UP (ref 135–145)
WBC # BLD: 18.91 K/UL — HIGH (ref 3.8–10.5)
WBC # FLD AUTO: 18.91 K/UL — HIGH (ref 3.8–10.5)

## 2020-02-07 PROCEDURE — 76700 US EXAM ABDOM COMPLETE: CPT | Mod: 26

## 2020-02-07 RX ADMIN — Medication 975 MILLIGRAM(S): at 09:05

## 2020-02-07 RX ADMIN — Medication 975 MILLIGRAM(S): at 08:16

## 2020-02-07 RX ADMIN — ENOXAPARIN SODIUM 40 MILLIGRAM(S): 100 INJECTION SUBCUTANEOUS at 15:13

## 2020-02-07 RX ADMIN — BENZOCAINE AND MENTHOL 1 LOZENGE: 5; 1 LIQUID ORAL at 08:21

## 2020-02-07 RX ADMIN — OXYCODONE HYDROCHLORIDE 10 MILLIGRAM(S): 5 TABLET ORAL at 03:03

## 2020-02-07 RX ADMIN — OXYCODONE HYDROCHLORIDE 5 MILLIGRAM(S): 5 TABLET ORAL at 15:17

## 2020-02-07 RX ADMIN — PANTOPRAZOLE SODIUM 40 MILLIGRAM(S): 20 TABLET, DELAYED RELEASE ORAL at 05:53

## 2020-02-07 RX ADMIN — Medication 1 PACKET(S): at 15:12

## 2020-02-07 RX ADMIN — OXYCODONE HYDROCHLORIDE 10 MILLIGRAM(S): 5 TABLET ORAL at 20:40

## 2020-02-07 RX ADMIN — OXYCODONE HYDROCHLORIDE 5 MILLIGRAM(S): 5 TABLET ORAL at 16:10

## 2020-02-07 RX ADMIN — OXYCODONE HYDROCHLORIDE 10 MILLIGRAM(S): 5 TABLET ORAL at 19:54

## 2020-02-07 RX ADMIN — SERTRALINE 150 MILLIGRAM(S): 25 TABLET, FILM COATED ORAL at 15:15

## 2020-02-07 RX ADMIN — OXYCODONE HYDROCHLORIDE 10 MILLIGRAM(S): 5 TABLET ORAL at 03:33

## 2020-02-07 RX ADMIN — POLYETHYLENE GLYCOL 3350 17 GRAM(S): 17 POWDER, FOR SOLUTION ORAL at 03:03

## 2020-02-07 NOTE — PROGRESS NOTE ADULT - ASSESSMENT
74F with no PMHx and PSHx of R TKR 15 years ago and left TKR this past Friday (1/24) at [place] presenting with 4 days of abdominal pain and distension in the setting of constipation and a history of diverticulosis. Found to have significant pneumoperitoneum on CTAP concerning for perforated bowel, although the source of the perforation remains unclear, it is likely from diverticuli. Now s/p exploratory laparotomy, Salas procedure and left oophorectomy (1/30).     - Pain control as needed   - DVT ppx  - OOB/ ambulate  - regular diet   - Gas and stool in ostomy bag  - d/c wound vac prior to discharge, discharge with wet to dry dressing   - Dispo planning: home with home PT  - increased WBC- pending US Abdomen, hepatic panel, and RVP for source   -Discussed incidental finding of cystic lesions in both ovaries. Given copy of CT scan and told to follow up with her outpatient gynecologist.     Beatriz Bundy PA-C   [id number]  ACS

## 2020-02-07 NOTE — PROGRESS NOTE ADULT - SUBJECTIVE AND OBJECTIVE BOX
SURGERY DAILY PROGRESS NOTE:       SUBJECTIVE/ROS:  Patient seen and evaluated at the bedside on AM rounds. Patient doing well. Gas and liquid stool in ostomy appliance. Tolerating a regular diet. With a complaint of some right upper quadrant pain that is crampy in nature.   Denies nausea, vomiting, chest pain, shortness of breath       OBJECTIVE:    Vital Signs Last 24 Hrs  T(C): 36.8 (2020 06:47), Max: 37.1 (2020 17:12)  T(F): 98.3 (2020 06:47), Max: 98.8 (2020 17:12)  HR: 96 (2020 06:47) (88 - 96)  BP: 128/76 (2020 06:47) (105/61 - 128/76)  BP(mean): --  RR: 18 (2020 06:47) (18 - 18)  SpO2: 97% (2020 06:47) (94% - 97%)  I&O's Detail    2020 07:01  -  2020 07:00  --------------------------------------------------------  IN:    Oral Fluid: 1200 mL    Solution: 50 mL  Total IN: 1250 mL    OUT:    Colostomy: 5 mL    VAC (Vacuum Assisted Closure) System: 20 mL    Voided: 950 mL  Total OUT: 975 mL    Total NET: 275 mL        Daily     Daily   MEDICATIONS  (STANDING):  enoxaparin Injectable 40 milliGRAM(s) SubCutaneous daily  pantoprazole    Tablet 40 milliGRAM(s) Oral before breakfast  potassium phosphate / sodium phosphate powder 1 Packet(s) Oral two times a day with meals  sertraline 150 milliGRAM(s) Oral daily    MEDICATIONS  (PRN):  acetaminophen   Tablet .. 975 milliGRAM(s) Oral every 6 hours PRN Mild Pain (1 - 3)  benzocaine 15 mG/menthol 3.6 mG (Sugar-Free) Lozenge 1 Lozenge Oral every 4 hours PRN Sore Throat  oxyCODONE    IR 5 milliGRAM(s) Oral every 4 hours PRN Moderate Pain (4 - 6)  oxyCODONE    IR 10 milliGRAM(s) Oral every 6 hours PRN Severe Pain (7 - 10)  polyethylene glycol 3350 17 Gram(s) Oral daily PRN Constipation      LABS:                        9.9    18.91 )-----------( 446      ( 2020 07:14 )             31.2     02-06    136  |  102  |  22  ----------------------------<  94  4.0   |  24  |  1.08    Ca    8.5      2020 06:41  Phos  2.5     02-06  Mg     1.8     02-06        Urinalysis Basic - ( 2020 19:06 )    Color: Yellow / Appearance: Clear / S.046 / pH: x  Gluc: x / Ketone: Negative  / Bili: Negative / Urobili: 4 mg/dL   Blood: x / Protein: Trace / Nitrite: Negative   Leuk Esterase: Negative / RBC: 3 /hpf / WBC 2 /HPF   Sq Epi: x / Non Sq Epi: 1 /hpf / Bacteria: Negative          PHYSICAL EXAM:  General: NAD  HEENT: NC/AT  Pulmonary: Breathing comfortably on RA  Cardiovascular: NSR, no murmurs  Abdominal: soft, non distended, some ruq tenderness to palpation, ostomy pink with gas and stool in the ostomy bag, midline wound vac in place with good suction  Extremities: Knee incision c/d/i

## 2020-02-07 NOTE — CHART NOTE - NSCHARTNOTEFT_GEN_A_CORE
Chart Note - Nutrition Services    Consult received for education prior to discharge.     Pt is a 75 yo female with no PMH, with R TKR 15 years ago and recent left TKR (1/24/2020) who presented with abdominal pain and distension x 4 days, admitted 1/30. Chart reviewed events noted. Pt is now POD#8 s/p exploratory laparotomy, Luisana procedure and left oophorectomy (1/30). Discharge planning.     Visited pt, family at bedside. Provided verbal diet education supplemented with written handout: "Colostomy Nutrition Therapy." Discussed importance of avoidance of high-fiber foods and listed high-fiber foods for pt and family. Educated pt on recommended daily fiber intake (13 grams). Emphasized chewing foods thoroughly and taking small bites. Discussed consuming small frequent meals at home upon discharge, and including protein at each small meal/snack (with examples). Educated pt and family on foods to thicken stool if experiencing diarrhea (bananas, applesauce, rice). Discussed foods which may cause odor or gas. Reviewed lists of foods recommended and foods to avoid and discussed consuming foods as per tolerance. Answered pt and family's questions. Pt and family made aware RD remains available.    RD remains available upon request and will follow up per protocol.   Sarah Herrera, MS, RD, CDN Pager #[phone]

## 2020-02-08 LAB
ALBUMIN SERPL ELPH-MCNC: 2.8 G/DL — LOW (ref 3.3–5)
ALP SERPL-CCNC: 154 U/L — HIGH (ref 40–120)
ALT FLD-CCNC: 13 U/L — SIGNIFICANT CHANGE UP (ref 10–45)
ANION GAP SERPL CALC-SCNC: 12 MMOL/L — SIGNIFICANT CHANGE UP (ref 5–17)
AST SERPL-CCNC: 15 U/L — SIGNIFICANT CHANGE UP (ref 10–40)
BILIRUB DIRECT SERPL-MCNC: 0.4 MG/DL — HIGH (ref 0–0.2)
BILIRUB INDIRECT FLD-MCNC: 0.4 MG/DL — SIGNIFICANT CHANGE UP (ref 0.2–1)
BILIRUB SERPL-MCNC: 0.8 MG/DL — SIGNIFICANT CHANGE UP (ref 0.2–1.2)
BUN SERPL-MCNC: 24 MG/DL — HIGH (ref 7–23)
CALCIUM SERPL-MCNC: 8.9 MG/DL — SIGNIFICANT CHANGE UP (ref 8.4–10.5)
CHLORIDE SERPL-SCNC: 102 MMOL/L — SIGNIFICANT CHANGE UP (ref 96–108)
CO2 SERPL-SCNC: 24 MMOL/L — SIGNIFICANT CHANGE UP (ref 22–31)
CREAT SERPL-MCNC: 1.02 MG/DL — SIGNIFICANT CHANGE UP (ref 0.5–1.3)
GLUCOSE SERPL-MCNC: 88 MG/DL — SIGNIFICANT CHANGE UP (ref 70–99)
HCT VFR BLD CALC: 29.4 % — LOW (ref 34.5–45)
HGB BLD-MCNC: 8.9 G/DL — LOW (ref 11.5–15.5)
LIDOCAIN IGE QN: 157 U/L — HIGH (ref 7–60)
MAGNESIUM SERPL-MCNC: 1.8 MG/DL — SIGNIFICANT CHANGE UP (ref 1.6–2.6)
MCHC RBC-ENTMCNC: 28.5 PG — SIGNIFICANT CHANGE UP (ref 27–34)
MCHC RBC-ENTMCNC: 30.3 GM/DL — LOW (ref 32–36)
MCV RBC AUTO: 94.2 FL — SIGNIFICANT CHANGE UP (ref 80–100)
NRBC # BLD: 0 /100 WBCS — SIGNIFICANT CHANGE UP (ref 0–0)
PHOSPHATE SERPL-MCNC: 2.9 MG/DL — SIGNIFICANT CHANGE UP (ref 2.5–4.5)
PLATELET # BLD AUTO: 439 K/UL — HIGH (ref 150–400)
POTASSIUM SERPL-MCNC: 3.9 MMOL/L — SIGNIFICANT CHANGE UP (ref 3.5–5.3)
POTASSIUM SERPL-SCNC: 3.9 MMOL/L — SIGNIFICANT CHANGE UP (ref 3.5–5.3)
PROT SERPL-MCNC: 6 G/DL — SIGNIFICANT CHANGE UP (ref 6–8.3)
RBC # BLD: 3.12 M/UL — LOW (ref 3.8–5.2)
RBC # FLD: 17.1 % — HIGH (ref 10.3–14.5)
SODIUM SERPL-SCNC: 138 MMOL/L — SIGNIFICANT CHANGE UP (ref 135–145)
WBC # BLD: 14.08 K/UL — HIGH (ref 3.8–10.5)
WBC # FLD AUTO: 14.08 K/UL — HIGH (ref 3.8–10.5)

## 2020-02-08 PROCEDURE — 78226 HEPATOBILIARY SYSTEM IMAGING: CPT | Mod: 26

## 2020-02-08 RX ORDER — FUROSEMIDE 40 MG
10 TABLET ORAL ONCE
Refills: 0 | Status: COMPLETED | OUTPATIENT
Start: 2020-02-08 | End: 2020-02-08

## 2020-02-08 RX ADMIN — Medication 10 MILLIGRAM(S): at 17:17

## 2020-02-08 RX ADMIN — OXYCODONE HYDROCHLORIDE 5 MILLIGRAM(S): 5 TABLET ORAL at 08:52

## 2020-02-08 RX ADMIN — OXYCODONE HYDROCHLORIDE 10 MILLIGRAM(S): 5 TABLET ORAL at 21:20

## 2020-02-08 RX ADMIN — SERTRALINE 150 MILLIGRAM(S): 25 TABLET, FILM COATED ORAL at 12:32

## 2020-02-08 RX ADMIN — OXYCODONE HYDROCHLORIDE 10 MILLIGRAM(S): 5 TABLET ORAL at 20:50

## 2020-02-08 RX ADMIN — OXYCODONE HYDROCHLORIDE 5 MILLIGRAM(S): 5 TABLET ORAL at 05:00

## 2020-02-08 RX ADMIN — OXYCODONE HYDROCHLORIDE 5 MILLIGRAM(S): 5 TABLET ORAL at 04:20

## 2020-02-08 RX ADMIN — OXYCODONE HYDROCHLORIDE 5 MILLIGRAM(S): 5 TABLET ORAL at 12:34

## 2020-02-08 RX ADMIN — OXYCODONE HYDROCHLORIDE 5 MILLIGRAM(S): 5 TABLET ORAL at 08:22

## 2020-02-08 RX ADMIN — OXYCODONE HYDROCHLORIDE 5 MILLIGRAM(S): 5 TABLET ORAL at 13:04

## 2020-02-08 RX ADMIN — ENOXAPARIN SODIUM 40 MILLIGRAM(S): 100 INJECTION SUBCUTANEOUS at 12:32

## 2020-02-08 NOTE — PROGRESS NOTE ADULT - ASSESSMENT
74F with no PMHx and PSHx of R TKR 15 years ago and left TKR this past Friday (1/24) at [place] presenting with 4 days of abdominal pain and distension in the setting of constipation and a history of diverticulosis. Found to have significant pneumoperitoneum on CTAP concerning for perforated bowel, although the source of the perforation remains unclear, it is likely from diverticuli. Now s/p exploratory laparotomy, Salas procedure and left oophorectomy (1/30).     - Pain control as needed   - DVT ppx  - OOB/ ambulate  - regular diet   - Gas and stool in ostomy bag  - d/c wound vac prior to discharge, discharge with wet to dry dressing   - Dispo planning: home with home PT  - HIDA negative for acute jatin       [id number]  ACS

## 2020-02-08 NOTE — PROGRESS NOTE ADULT - SUBJECTIVE AND OBJECTIVE BOX
SURGERY DAILY PROGRESS NOTE:       SUBJECTIVE/ROS:  Patient seen and evaluated at the bedside on AM rounds. Patient doing well. Gas and liquid stool in ostomy appliance. Tolerating a regular diet. Denies nausea, vomiting, chest pain, shortness of breath       OBJECTIVE:            Vital Signs:  Vital Signs Last 24 Hrs  T(C): 36.8 (08 Feb 2020 06:51), Max: 36.9 (07 Feb 2020 17:00)  T(F): 98.2 (08 Feb 2020 06:51), Max: 98.5 (07 Feb 2020 17:00)  HR: 79 (08 Feb 2020 06:51) (75 - 89)  BP: 118/76 (08 Feb 2020 06:51) (110/72 - 126/69)  BP(mean): --  RR: 18 (08 Feb 2020 06:51) (18 - 18)  SpO2: 94% (08 Feb 2020 06:51) (94% - 97%)    CAPILLARY BLOOD GLUCOSE          I&O's Detail    07 Feb 2020 07:01  -  08 Feb 2020 07:00  --------------------------------------------------------  IN:    Oral Fluid: 720 mL  Total IN: 720 mL    OUT:    Colostomy: 201 mL    Voided: 650 mL  Total OUT: 851 mL    Total NET: -131 mL          MEDICATIONS  (STANDING):  enoxaparin Injectable 40 milliGRAM(s) SubCutaneous daily  pantoprazole    Tablet 40 milliGRAM(s) Oral before breakfast  sertraline 150 milliGRAM(s) Oral daily    MEDICATIONS  (PRN):  acetaminophen   Tablet .. 975 milliGRAM(s) Oral every 6 hours PRN Mild Pain (1 - 3)  benzocaine 15 mG/menthol 3.6 mG (Sugar-Free) Lozenge 1 Lozenge Oral every 4 hours PRN Sore Throat  oxyCODONE    IR 5 milliGRAM(s) Oral every 4 hours PRN Moderate Pain (4 - 6)  oxyCODONE    IR 10 milliGRAM(s) Oral every 6 hours PRN Severe Pain (7 - 10)  polyethylene glycol 3350 17 Gram(s) Oral daily PRN Constipation          Labs:    02-08    138  |  102  |  24<H>  ----------------------------<  88  3.9   |  24  |  1.02    Ca    8.9      08 Feb 2020 07:31  Phos  2.9     02-08  Mg     1.8     02-08    TPro  6.0  /  Alb  2.8<L>  /  TBili  0.8  /  DBili  0.4<H>  /  AST  15  /  ALT  13  /  AlkPhos  154<H>  02-08    LIVER FUNCTIONS - ( 08 Feb 2020 07:31 )  Alb: 2.8 g/dL / Pro: 6.0 g/dL / ALK PHOS: 154 U/L / ALT: 13 U/L / AST: 15 U/L / GGT: x                                 8.9    14.08 )-----------( 439      ( 08 Feb 2020 07:31 )             29.4         Imaging:            PHYSICAL EXAM:  General: NAD  HEENT: NC/AT  Pulmonary: Breathing comfortably on RA  Cardiovascular: NSR, no murmurs  Abdominal: soft, non distended, some ruq tenderness to palpation, ostomy pink with gas and stool in the ostomy bag, midline wound vac in place with good suction  Extremities: Knee incision c/d/i

## 2020-02-09 LAB
ALBUMIN SERPL ELPH-MCNC: 2.6 G/DL — LOW (ref 3.3–5)
ALP SERPL-CCNC: 136 U/L — HIGH (ref 40–120)
ALT FLD-CCNC: 10 U/L — SIGNIFICANT CHANGE UP (ref 10–45)
AMYLASE P1 CFR SERPL: 196 U/L — HIGH (ref 25–125)
ANION GAP SERPL CALC-SCNC: 11 MMOL/L — SIGNIFICANT CHANGE UP (ref 5–17)
AST SERPL-CCNC: 14 U/L — SIGNIFICANT CHANGE UP (ref 10–40)
BILIRUB SERPL-MCNC: 0.7 MG/DL — SIGNIFICANT CHANGE UP (ref 0.2–1.2)
BUN SERPL-MCNC: 26 MG/DL — HIGH (ref 7–23)
CALCIUM SERPL-MCNC: 8.7 MG/DL — SIGNIFICANT CHANGE UP (ref 8.4–10.5)
CHLORIDE SERPL-SCNC: 102 MMOL/L — SIGNIFICANT CHANGE UP (ref 96–108)
CO2 SERPL-SCNC: 25 MMOL/L — SIGNIFICANT CHANGE UP (ref 22–31)
CREAT SERPL-MCNC: 0.98 MG/DL — SIGNIFICANT CHANGE UP (ref 0.5–1.3)
GLUCOSE SERPL-MCNC: 92 MG/DL — SIGNIFICANT CHANGE UP (ref 70–99)
HCT VFR BLD CALC: 27.8 % — LOW (ref 34.5–45)
HGB BLD-MCNC: 8.4 G/DL — LOW (ref 11.5–15.5)
LIDOCAIN IGE QN: 97 U/L — HIGH (ref 7–60)
MAGNESIUM SERPL-MCNC: 1.8 MG/DL — SIGNIFICANT CHANGE UP (ref 1.6–2.6)
MCHC RBC-ENTMCNC: 28.4 PG — SIGNIFICANT CHANGE UP (ref 27–34)
MCHC RBC-ENTMCNC: 30.2 GM/DL — LOW (ref 32–36)
MCV RBC AUTO: 93.9 FL — SIGNIFICANT CHANGE UP (ref 80–100)
NRBC # BLD: 0 /100 WBCS — SIGNIFICANT CHANGE UP (ref 0–0)
PHOSPHATE SERPL-MCNC: 3.1 MG/DL — SIGNIFICANT CHANGE UP (ref 2.5–4.5)
PLATELET # BLD AUTO: 379 K/UL — SIGNIFICANT CHANGE UP (ref 150–400)
POTASSIUM SERPL-MCNC: 4.3 MMOL/L — SIGNIFICANT CHANGE UP (ref 3.5–5.3)
POTASSIUM SERPL-SCNC: 4.3 MMOL/L — SIGNIFICANT CHANGE UP (ref 3.5–5.3)
PROT SERPL-MCNC: 5.5 G/DL — LOW (ref 6–8.3)
RBC # BLD: 2.96 M/UL — LOW (ref 3.8–5.2)
RBC # FLD: 16.7 % — HIGH (ref 10.3–14.5)
SODIUM SERPL-SCNC: 138 MMOL/L — SIGNIFICANT CHANGE UP (ref 135–145)
WBC # BLD: 10.77 K/UL — HIGH (ref 3.8–10.5)
WBC # FLD AUTO: 10.77 K/UL — HIGH (ref 3.8–10.5)

## 2020-02-09 RX ORDER — DIPHENHYDRAMINE HCL 50 MG
25 CAPSULE ORAL ONCE
Refills: 0 | Status: COMPLETED | OUTPATIENT
Start: 2020-02-09 | End: 2020-02-09

## 2020-02-09 RX ORDER — DEXTROSE MONOHYDRATE, SODIUM CHLORIDE, AND POTASSIUM CHLORIDE 50; .745; 4.5 G/1000ML; G/1000ML; G/1000ML
1000 INJECTION, SOLUTION INTRAVENOUS
Refills: 0 | Status: DISCONTINUED | OUTPATIENT
Start: 2020-02-09 | End: 2020-02-10

## 2020-02-09 RX ORDER — FUROSEMIDE 40 MG
10 TABLET ORAL ONCE
Refills: 0 | Status: COMPLETED | OUTPATIENT
Start: 2020-02-09 | End: 2020-02-09

## 2020-02-09 RX ADMIN — OXYCODONE HYDROCHLORIDE 10 MILLIGRAM(S): 5 TABLET ORAL at 21:10

## 2020-02-09 RX ADMIN — OXYCODONE HYDROCHLORIDE 5 MILLIGRAM(S): 5 TABLET ORAL at 11:47

## 2020-02-09 RX ADMIN — OXYCODONE HYDROCHLORIDE 10 MILLIGRAM(S): 5 TABLET ORAL at 20:24

## 2020-02-09 RX ADMIN — Medication 25 MILLIGRAM(S): at 22:32

## 2020-02-09 RX ADMIN — PANTOPRAZOLE SODIUM 40 MILLIGRAM(S): 20 TABLET, DELAYED RELEASE ORAL at 08:59

## 2020-02-09 RX ADMIN — OXYCODONE HYDROCHLORIDE 5 MILLIGRAM(S): 5 TABLET ORAL at 02:09

## 2020-02-09 RX ADMIN — Medication 10 MILLIGRAM(S): at 09:59

## 2020-02-09 RX ADMIN — OXYCODONE HYDROCHLORIDE 5 MILLIGRAM(S): 5 TABLET ORAL at 12:30

## 2020-02-09 RX ADMIN — ENOXAPARIN SODIUM 40 MILLIGRAM(S): 100 INJECTION SUBCUTANEOUS at 11:44

## 2020-02-09 RX ADMIN — SERTRALINE 150 MILLIGRAM(S): 25 TABLET, FILM COATED ORAL at 11:44

## 2020-02-09 RX ADMIN — OXYCODONE HYDROCHLORIDE 5 MILLIGRAM(S): 5 TABLET ORAL at 02:45

## 2020-02-09 NOTE — CONSULT NOTE ADULT - ASSESSMENT
Impression:  # Abdominal pain: DDx pancreatitis, GERD, functional dyspepsia, biliary colic. Pt with u/s showing distended gallbladder with sludge, RUQ/epigastric pain and elevated lipase c/f gallstone/sludge induced pancreatitis. Patient tolerating po so if mild pancreatitis prior appears it has resolved. CT scan w/o evidence of pancreatitis but may have been early in evolution of symptoms.  # Perforated diverticulum s/p alexandrea procedure  # TKR    Recommendation:  - monitor CBC, CMP and fever curve  - will discuss with advanced team need for nonurgent EUS to evaluate biliary tree  - diet for now as tolerated; recommend low fat/low residue  - please make NPO at midnight for possible EUS tmro, will discuss with advanced team  - continue with PPI daily  - supportive care per primary team    Cecilio Valladares  [phone]  Please call/page on call fellow on weekends and weekdays after 5pm

## 2020-02-09 NOTE — PROGRESS NOTE ADULT - ASSESSMENT
74F with no PMHx and PSHx of R TKR 15 years ago and left TKR this past Friday (1/24) at [place] presenting with 4 days of abdominal pain and distension in the setting of constipation and a history of diverticulosis. Found to have significant pneumoperitoneum on CTAP concerning for perforated bowel, although the source of the perforation remains unclear, it is likely from diverticuli. Now s/p exploratory laparotomy, Salas procedure and left oophorectomy (1/30).     - Pain control as needed   - DVT ppx  - OOB/ ambulate  - regular diet   - Gas and stool in ostomy bag  - d/c wound vac prior to discharge, discharge with wet to dry dressing   - Dispo planning: home with home PT  - Lipase trending down    [id number]  ACS

## 2020-02-09 NOTE — PROGRESS NOTE ADULT - SUBJECTIVE AND OBJECTIVE BOX
SURGERY DAILY PROGRESS NOTE:       SUBJECTIVE/ROS:  Patient seen and evaluated at the bedside on AM rounds. Patient doing well. Still has some mild abdominal tightness in upper abdomen. Gas and liquid stool in ostomy appliance. Tolerating a regular diet. Denies nausea, vomiting, chest pain, shortness of breath             Vital Signs:  Vital Signs Last 24 Hrs  T(C): 37 (09 Feb 2020 09:34), Max: 37.2 (08 Feb 2020 21:27)  T(F): 98.6 (09 Feb 2020 09:34), Max: 99 (08 Feb 2020 21:27)  HR: 80 (09 Feb 2020 09:58) (73 - 87)  BP: 107/67 (09 Feb 2020 09:58) (107/56 - 126/73)  BP(mean): --  RR: 18 (09 Feb 2020 09:34) (17 - 18)  SpO2: 97% (09 Feb 2020 09:34) (94% - 98%)    CAPILLARY BLOOD GLUCOSE          I&O's Detail    08 Feb 2020 07:01  -  09 Feb 2020 07:00  --------------------------------------------------------  IN:    Oral Fluid: 1400 mL  Total IN: 1400 mL    OUT:    Colostomy: 200 mL    Voided: 1353 mL  Total OUT: 1553 mL    Total NET: -153 mL      09 Feb 2020 07:01  -  09 Feb 2020 10:39  --------------------------------------------------------  IN:    Oral Fluid: 120 mL  Total IN: 120 mL    OUT:    Voided: 200 mL  Total OUT: 200 mL    Total NET: -80 mL          MEDICATIONS  (STANDING):  enoxaparin Injectable 40 milliGRAM(s) SubCutaneous daily  pantoprazole    Tablet 40 milliGRAM(s) Oral before breakfast  sertraline 150 milliGRAM(s) Oral daily    MEDICATIONS  (PRN):  acetaminophen   Tablet .. 975 milliGRAM(s) Oral every 6 hours PRN Mild Pain (1 - 3)  benzocaine 15 mG/menthol 3.6 mG (Sugar-Free) Lozenge 1 Lozenge Oral every 4 hours PRN Sore Throat  oxyCODONE    IR 5 milliGRAM(s) Oral every 4 hours PRN Moderate Pain (4 - 6)  oxyCODONE    IR 10 milliGRAM(s) Oral every 6 hours PRN Severe Pain (7 - 10)  polyethylene glycol 3350 17 Gram(s) Oral daily PRN Constipation          Labs:    02-09    138  |  102  |  26<H>  ----------------------------<  92  4.3   |  25  |  0.98    Ca    8.7      09 Feb 2020 07:20  Phos  3.1     02-09  Mg     1.8     02-09    TPro  5.5<L>  /  Alb  2.6<L>  /  TBili  0.7  /  DBili  x   /  AST  14  /  ALT  10  /  AlkPhos  136<H>  02-09    LIVER FUNCTIONS - ( 09 Feb 2020 07:20 )  Alb: 2.6 g/dL / Pro: 5.5 g/dL / ALK PHOS: 136 U/L / ALT: 10 U/L / AST: 14 U/L / GGT: x                                 8.4    10.77 )-----------( 379      ( 09 Feb 2020 08:19 )             27.8         Imaging:                PHYSICAL EXAM:  General: NAD  HEENT: NC/AT  Pulmonary: Breathing comfortably on RA  Cardiovascular: NSR, no murmurs  Abdominal: soft, non distended, mild ruq tenderness to palpation, ostomy pink with gas and stool in the ostomy bag, midline wound vac in place with good suction  Extremities: Knee incision c/d/i

## 2020-02-09 NOTE — CONSULT NOTE ADULT - SUBJECTIVE AND OBJECTIVE BOX
Chief Complaint:  Patient is a 74y old  Female who presents with a chief complaint of Abdominal pain (09 Feb 2020 10:37)      HPI: Pt is a 75yo F with no PMHx and PSHx of R TKR 15 years ago and left TKR (1/24) at [place] presenting with 4 days of abdominal pain and distension w/ hx of diverticulosis found to have significant pneumoperitoneum on CTAP concerning for likely perforated diverticulosis, s/p exploratory laparotomy, Salas procedure and left oophorectomy. GI consulted for abdominal pain. For past 3 days patient developed upper abdominal tightness. She ntoes pain as well in her RUQ. She initially was having po intolerance but that has thus subsided. Denies prior symptoms. No changes in ostomy output. Denies fever or chills, shortness of breath, cough. Denies vomiting but has occasional nasuea. No prior issue with gallstones. Complains of mild reflux symptoms but not related to abdominal tightness.    Allergies:  No Known Allergies      Home Medications:    Hospital Medications:  acetaminophen   Tablet .. 975 milliGRAM(s) Oral every 6 hours PRN  benzocaine 15 mG/menthol 3.6 mG (Sugar-Free) Lozenge 1 Lozenge Oral every 4 hours PRN  enoxaparin Injectable 40 milliGRAM(s) SubCutaneous daily  oxyCODONE    IR 5 milliGRAM(s) Oral every 4 hours PRN  oxyCODONE    IR 10 milliGRAM(s) Oral every 6 hours PRN  pantoprazole    Tablet 40 milliGRAM(s) Oral before breakfast  polyethylene glycol 3350 17 Gram(s) Oral daily PRN  sertraline 150 milliGRAM(s) Oral daily      PMHX/PSHX:  No pertinent past medical history  Hard of hearing  SPL (spondylolisthesis)  H/O scoliosis  Spinal stenosis  Anxiety  Osteopenia  S/P total knee replacement, left  S/P total knee arthroplasty, right  S/P cataract extraction and insertion of intraocular lens  S/P hammer toe correction  S/P shoulder surgery  H/O carpal tunnel repair  H/O total knee replacement, right      Family history:      Social History:     ROS:     General:  No wt loss, fevers, chills, night sweats, fatigue,   Eyes:  Good vision, no reported pain  ENT:  No sore throat, pain, runny nose, dysphagia  CV:  No pain, palpitations, hypo/hypertension  Resp:  No dyspnea, cough, tachypnea, wheezing  GI:  See HPI  :  No pain, bleeding, incontinence, nocturia  Muscle:  No pain, weakness  Neuro:  No weakness, tingling, memory problems  Psych:  No fatigue, insomnia, mood problems, depression  Endocrine:  No polyuria, polydipsia, cold/heat intolerance  Heme:  No petechiae, ecchymosis, easy bruisability  Skin:  No rash, edema      PHYSICAL EXAM:     Vital Signs:  Vital Signs Last 24 Hrs  T(C): 37.2 (09 Feb 2020 14:19), Max: 37.2 (08 Feb 2020 21:27)  T(F): 99 (09 Feb 2020 14:19), Max: 99 (08 Feb 2020 21:27)  HR: 83 (09 Feb 2020 14:19) (73 - 86)  BP: 106/64 (09 Feb 2020 14:19) (106/64 - 126/73)  BP(mean): --  RR: 18 (09 Feb 2020 14:19) (17 - 18)  SpO2: 95% (09 Feb 2020 14:19) (95% - 97%)  Daily     Daily     GENERAL:  Appears stated age, well-groomed, well-nourished, no distress  HEENT:  NC/AT,  conjunctivae clear and pink  CHEST:  Full & symmetric excursion, no increased effort  HEART:  Regular rhythm, no JVD  ABDOMEN:  Soft, mild-tender RUQ, non-distended, ostomy with brown stool, normoactive bowel sounds  EXTREMITIES:  no cyanosis, clubbing or edema, L knee surgical scar noted  SKIN:  No rash/erythema/ecchymoses/petechiae/wounds/abscess/warm/dry  NEURO:  Alert, oriented, nonfocal    LABS:                        8.4    10.77 )-----------( 379      ( 09 Feb 2020 08:19 )             27.8     02-09    138  |  102  |  26<H>  ----------------------------<  92  4.3   |  25  |  0.98    Ca    8.7      09 Feb 2020 07:20  Phos  3.1     02-09  Mg     1.8     02-09    TPro  5.5<L>  /  Alb  2.6<L>  /  TBili  0.7  /  DBili  x   /  AST  14  /  ALT  10  /  AlkPhos  136<H>  02-09    LIVER FUNCTIONS - ( 09 Feb 2020 07:20 )  Alb: 2.6 g/dL / Pro: 5.5 g/dL / ALK PHOS: 136 U/L / ALT: 10 U/L / AST: 14 U/L / GGT: x               Amylase [id number]      Lipase serum97       Ammonia--      Imaging:  < from: US Abdomen Complete (02.07.20 @ 14:36) >  IMPRESSION:     Evaluation is limited secondary to poor acoustic window.    Gallbladder is distended with sludge. No significant wall thickening or sonographic Ogden sign. If clinical suspicion persists, further evaluation with HIDA scan can be obtained.    Bilateral pleural effusions.    < end of copied text >    < from: CT Abdomen and Pelvis w/ Oral Cont and w/ IV Cont (02.06.20 @ 12:47) >  FINDINGS:    CHEST:     LUNGS AND LARGE AIRWAYS: Patent central airways. A few scattered foci of groundglass opacities bilaterally.  PLEURA: Small bilateral pleural effusions, right greater than left.  VESSELS: Atherosclerotic changes of the aorta and coronary arteries.  HEART: Heart size is normal. No pericardial effusion.  MEDIASTINUM AND ADAL: No lymphadenopathy.  CHEST WALL AND LOWER NECK: Within normal limits.    ABDOMEN AND PELVIS:    LIVER: Within normal limits.  BILE DUCTS: Normal caliber.  GALLBLADDER: Distended.  SPLEEN: Within normal limits.  PANCREAS: Within normal limits.  ADRENALS: Within normal limits.  KIDNEYS/URETERS: Bilateral cortical hypodensities, many of which are linear, left greater than right.    BLADDER: Within normal limits.  REPRODUCTIVE ORGANS: Uterus is within normal limits. There are cystic lesions in both ovaries..    BOWEL: No bowel obstruction. Appendix is normal. Small hiatal hernia with gastroesophageal reflux. Partial colectomy with Salas's procedure and left colostomy. Contrast opacifies most of the GI tract. Scattered bowel wall thickening which is likely reactive.  PERITONEUM: Small free fluid. No discrete collection. Scattered pneumoperitoneum is postprocedural.  VESSELS: Atherosclerotic changes. Retroaortic left renal vein.  RETROPERITONEUM/LYMPH NODES: No lymphadenopathy.    ABDOMINAL WALL: Anasarca. Postsurgical changes in the anterior abdominal wall.  BONES: Multiple prior rib fractures. Degenerative changes.    IMPRESSION:     Small bilateral pleural effusions.  Findings are suggestive of bilateral pyelonephritis.  Small free fluid in the abdomen and pelvis without discrete collection.No bowel obstruction.  Cystic lesions in both ovaries which should be further evaluated with a pelvic ultrasound on outpatient basis.    < end of copied text >    < from: [facility] Hepatobiliary Imaging (02.08.20 @ 09:53) >    FINDINGS: There is prompt, homogeneous uptake of radiopharmaceutical by the hepatocytes. Activity is first seen in the gallbladder at about 40 minutes and in the bowel at about 40 minutes. There is good clearance of activity from the liver by the end of the study.    IMPRESSION: Normal hepatobiliary scan. No radionuclide evidence of acute cholecystitis.      < end of copied text >

## 2020-02-10 LAB
ALBUMIN SERPL ELPH-MCNC: 2.5 G/DL — LOW (ref 3.3–5)
ALP SERPL-CCNC: 131 U/L — HIGH (ref 40–120)
ALT FLD-CCNC: 12 U/L — SIGNIFICANT CHANGE UP (ref 10–45)
ANION GAP SERPL CALC-SCNC: 10 MMOL/L — SIGNIFICANT CHANGE UP (ref 5–17)
AST SERPL-CCNC: 17 U/L — SIGNIFICANT CHANGE UP (ref 10–40)
BILIRUB SERPL-MCNC: 0.7 MG/DL — SIGNIFICANT CHANGE UP (ref 0.2–1.2)
BUN SERPL-MCNC: 23 MG/DL — SIGNIFICANT CHANGE UP (ref 7–23)
CALCIUM SERPL-MCNC: 8.6 MG/DL — SIGNIFICANT CHANGE UP (ref 8.4–10.5)
CHLORIDE SERPL-SCNC: 104 MMOL/L — SIGNIFICANT CHANGE UP (ref 96–108)
CO2 SERPL-SCNC: 25 MMOL/L — SIGNIFICANT CHANGE UP (ref 22–31)
CREAT SERPL-MCNC: 0.92 MG/DL — SIGNIFICANT CHANGE UP (ref 0.5–1.3)
GLUCOSE SERPL-MCNC: 88 MG/DL — SIGNIFICANT CHANGE UP (ref 70–99)
HCT VFR BLD CALC: 26.6 % — LOW (ref 34.5–45)
HGB BLD-MCNC: 8 G/DL — LOW (ref 11.5–15.5)
LIDOCAIN IGE QN: 71 U/L — HIGH (ref 7–60)
MAGNESIUM SERPL-MCNC: 1.7 MG/DL — SIGNIFICANT CHANGE UP (ref 1.6–2.6)
MCHC RBC-ENTMCNC: 28.7 PG — SIGNIFICANT CHANGE UP (ref 27–34)
MCHC RBC-ENTMCNC: 30.1 GM/DL — LOW (ref 32–36)
MCV RBC AUTO: 95.3 FL — SIGNIFICANT CHANGE UP (ref 80–100)
NRBC # BLD: 0 /100 WBCS — SIGNIFICANT CHANGE UP (ref 0–0)
PHOSPHATE SERPL-MCNC: 3 MG/DL — SIGNIFICANT CHANGE UP (ref 2.5–4.5)
PLATELET # BLD AUTO: 428 K/UL — HIGH (ref 150–400)
POTASSIUM SERPL-MCNC: 4.5 MMOL/L — SIGNIFICANT CHANGE UP (ref 3.5–5.3)
POTASSIUM SERPL-SCNC: 4.5 MMOL/L — SIGNIFICANT CHANGE UP (ref 3.5–5.3)
PROT SERPL-MCNC: 5.5 G/DL — LOW (ref 6–8.3)
RBC # BLD: 2.79 M/UL — LOW (ref 3.8–5.2)
RBC # FLD: 16.7 % — HIGH (ref 10.3–14.5)
SODIUM SERPL-SCNC: 139 MMOL/L — SIGNIFICANT CHANGE UP (ref 135–145)
WBC # BLD: 10.67 K/UL — HIGH (ref 3.8–10.5)
WBC # FLD AUTO: 10.67 K/UL — HIGH (ref 3.8–10.5)

## 2020-02-10 PROCEDURE — 73610 X-RAY EXAM OF ANKLE: CPT | Mod: 26,LT

## 2020-02-10 PROCEDURE — 73630 X-RAY EXAM OF FOOT: CPT | Mod: 26,LT

## 2020-02-10 PROCEDURE — 74181 MRI ABDOMEN W/O CONTRAST: CPT | Mod: 26

## 2020-02-10 PROCEDURE — 99232 SBSQ HOSP IP/OBS MODERATE 35: CPT | Mod: GC

## 2020-02-10 RX ORDER — MAGNESIUM SULFATE 500 MG/ML
2 VIAL (ML) INJECTION ONCE
Refills: 0 | Status: COMPLETED | OUTPATIENT
Start: 2020-02-10 | End: 2020-02-10

## 2020-02-10 RX ORDER — DEXTROSE MONOHYDRATE, SODIUM CHLORIDE, AND POTASSIUM CHLORIDE 50; .745; 4.5 G/1000ML; G/1000ML; G/1000ML
1000 INJECTION, SOLUTION INTRAVENOUS
Refills: 0 | Status: DISCONTINUED | OUTPATIENT
Start: 2020-02-10 | End: 2020-02-10

## 2020-02-10 RX ORDER — DEXTROSE MONOHYDRATE, SODIUM CHLORIDE, AND POTASSIUM CHLORIDE 50; .745; 4.5 G/1000ML; G/1000ML; G/1000ML
1000 INJECTION, SOLUTION INTRAVENOUS
Refills: 0 | Status: DISCONTINUED | OUTPATIENT
Start: 2020-02-10 | End: 2020-02-11

## 2020-02-10 RX ORDER — DIPHENHYDRAMINE HCL 50 MG
25 CAPSULE ORAL ONCE
Refills: 0 | Status: COMPLETED | OUTPATIENT
Start: 2020-02-10 | End: 2020-02-10

## 2020-02-10 RX ADMIN — DEXTROSE MONOHYDRATE, SODIUM CHLORIDE, AND POTASSIUM CHLORIDE 75 MILLILITER(S): 50; .745; 4.5 INJECTION, SOLUTION INTRAVENOUS at 17:21

## 2020-02-10 RX ADMIN — OXYCODONE HYDROCHLORIDE 5 MILLIGRAM(S): 5 TABLET ORAL at 06:44

## 2020-02-10 RX ADMIN — OXYCODONE HYDROCHLORIDE 5 MILLIGRAM(S): 5 TABLET ORAL at 21:44

## 2020-02-10 RX ADMIN — Medication 50 GRAM(S): at 11:14

## 2020-02-10 RX ADMIN — OXYCODONE HYDROCHLORIDE 5 MILLIGRAM(S): 5 TABLET ORAL at 22:30

## 2020-02-10 RX ADMIN — OXYCODONE HYDROCHLORIDE 5 MILLIGRAM(S): 5 TABLET ORAL at 14:44

## 2020-02-10 RX ADMIN — PANTOPRAZOLE SODIUM 40 MILLIGRAM(S): 20 TABLET, DELAYED RELEASE ORAL at 05:37

## 2020-02-10 RX ADMIN — Medication 25 MILLIGRAM(S): at 21:44

## 2020-02-10 RX ADMIN — Medication 975 MILLIGRAM(S): at 17:18

## 2020-02-10 RX ADMIN — DEXTROSE MONOHYDRATE, SODIUM CHLORIDE, AND POTASSIUM CHLORIDE 75 MILLILITER(S): 50; .745; 4.5 INJECTION, SOLUTION INTRAVENOUS at 23:17

## 2020-02-10 RX ADMIN — DEXTROSE MONOHYDRATE, SODIUM CHLORIDE, AND POTASSIUM CHLORIDE 75 MILLILITER(S): 50; .745; 4.5 INJECTION, SOLUTION INTRAVENOUS at 00:00

## 2020-02-10 RX ADMIN — OXYCODONE HYDROCHLORIDE 5 MILLIGRAM(S): 5 TABLET ORAL at 15:20

## 2020-02-10 RX ADMIN — Medication 975 MILLIGRAM(S): at 18:00

## 2020-02-10 RX ADMIN — SERTRALINE 150 MILLIGRAM(S): 25 TABLET, FILM COATED ORAL at 11:19

## 2020-02-10 RX ADMIN — ENOXAPARIN SODIUM 40 MILLIGRAM(S): 100 INJECTION SUBCUTANEOUS at 11:19

## 2020-02-10 RX ADMIN — OXYCODONE HYDROCHLORIDE 5 MILLIGRAM(S): 5 TABLET ORAL at 05:54

## 2020-02-10 NOTE — PROVIDER CONTACT NOTE (OTHER) - ASSESSMENT
pt unable to bear weight on left leg, pt states leg feels like a "dead leg". pt complains of no pain to left leg, +pulses noted to popliteal and PT, DP, palpable, no warmth noted. pt denies numbness/tingling. swelling improved from yesterday.

## 2020-02-10 NOTE — PROVIDER CONTACT NOTE (OTHER) - ACTION/TREATMENT ORDERED:
MD recommend keep wound vac overnight and team will change drsng when pt is d/c'd
PA Aware - will make team aware that POCT BG was not for this pt
Dr aware, - encourage ambulation
Flush kearney. 500ml bolus of LR

## 2020-02-10 NOTE — PROGRESS NOTE ADULT - ASSESSMENT
74F with no PMHx and PSHx of R TKR 15 years ago and left TKR this past Friday (1/24) at [place] presenting with 4 days of abdominal pain and distension in the setting of constipation and a history of diverticulosis. Found to have significant pneumoperitoneum on CTAP concerning for perforated bowel, although the source of the perforation remains unclear, it is likely from diverticuli. Now s/p exploratory laparotomy, Salas procedure and left oophorectomy (1/30).     - F/U MRCP  - F/U XR left foot/ ankle for "dead leg" sensation  - Pain control as needed   - DVT ppx  - OOB/ ambulate  - regular diet   - d/c wound vac prior to discharge, discharge with wet to dry dressing   - Dispo planning: home with home PT  - Lipase trending down    [id number]  ACS

## 2020-02-10 NOTE — PROGRESS NOTE ADULT - SUBJECTIVE AND OBJECTIVE BOX
SURGERY DAILY PROGRESS NOTE:       SUBJECTIVE/ROS:  Patient seen and evaluated at the bedside on AM rounds. Patient doing well. Still has some mild abdominal tightness in upper abdomen. Gas and liquid stool in ostomy appliance. Tolerating a regular diet. Denies nausea, vomiting, chest pain, shortness of breath. GI recommending MRCP to evaluate biliary tree.             Vital Signs:  Vital Signs Last 24 Hrs  T(C): 36.8 (10 Feb 2020 06:22), Max: 37.2 (09 Feb 2020 14:19)  T(F): 98.3 (10 Feb 2020 06:22), Max: 99 (09 Feb 2020 14:19)  HR: 80 (10 Feb 2020 06:22) (79 - 85)  BP: 115/72 (10 Feb 2020 06:22) (106/64 - 119/73)  BP(mean): --  RR: 18 (10 Feb 2020 06:22) (18 - 18)  SpO2: 93% (10 Feb 2020 06:22) (93% - 98%)    CAPILLARY BLOOD GLUCOSE          I&O's Detail    09 Feb 2020 07:01  -  10 Feb 2020 07:00  --------------------------------------------------------  IN:    Oral Fluid: 360 mL    sodium chloride 0.45% with potassium chloride 20 mEq/L: 600 mL  Total IN: 960 mL    OUT:    Colostomy: 200 mL    Voided: 2350 mL  Total OUT: 2550 mL    Total NET: -1590 mL          MEDICATIONS  (STANDING):  enoxaparin Injectable 40 milliGRAM(s) SubCutaneous daily  magnesium sulfate  IVPB 2 Gram(s) IV Intermittent once  pantoprazole    Tablet 40 milliGRAM(s) Oral before breakfast  sertraline 150 milliGRAM(s) Oral daily  sodium chloride 0.45% with potassium chloride 20 mEq/L 1000 milliLiter(s) (75 mL/Hr) IV Continuous <Continuous>    MEDICATIONS  (PRN):  acetaminophen   Tablet .. 975 milliGRAM(s) Oral every 6 hours PRN Mild Pain (1 - 3)  benzocaine 15 mG/menthol 3.6 mG (Sugar-Free) Lozenge 1 Lozenge Oral every 4 hours PRN Sore Throat  oxyCODONE    IR 5 milliGRAM(s) Oral every 4 hours PRN Moderate Pain (4 - 6)  oxyCODONE    IR 10 milliGRAM(s) Oral every 6 hours PRN Severe Pain (7 - 10)  polyethylene glycol 3350 17 Gram(s) Oral daily PRN Constipation          Labs:    02-10    139  |  104  |  23  ----------------------------<  88  4.5   |  25  |  0.92    Ca    8.6      10 Feb 2020 07:09  Phos  3.0     02-10  Mg     1.7     02-10    TPro  5.5<L>  /  Alb  2.5<L>  /  TBili  0.7  /  DBili  x   /  AST  17  /  ALT  12  /  AlkPhos  131<H>  02-10    LIVER FUNCTIONS - ( 10 Feb 2020 07:09 )  Alb: 2.5 g/dL / Pro: 5.5 g/dL / ALK PHOS: 131 U/L / ALT: 12 U/L / AST: 17 U/L / GGT: x                                 8.0    10.67 )-----------( 428      ( 10 Feb 2020 07:11 )             26.6         Imaging:                PHYSICAL EXAM:  General: NAD  HEENT: NC/AT  Pulmonary: Breathing comfortably on RA  Cardiovascular: NSR, no murmurs  Abdominal: soft, non distended, mild ruq tenderness to palpation, ostomy pink with gas and stool in the ostomy bag, midline wound vac in place with good suction  Extremities: Knee incision c/d/i

## 2020-02-10 NOTE — CHART NOTE - NSCHARTNOTEFT_GEN_A_CORE
Discussed with advanced endoscopy team and reviewed imaging. Would recommend MRI/MRCP at this time for definitive evaluation of biliary system. Elevated lipase may have been in the setting of HARJIT.   Awaiting MRCP results. GI Fellow    Discussed with advanced endoscopy team and reviewed imaging. Would recommend MRI/MRCP at this time for definitive evaluation of biliary system. Elevated lipase may have been in the setting of HARJIT.   Awaiting MRCP results.    GI Advanced Endoscopy Attending Note:    Pt seen/examined.    Acute right upper quadrant / epigastric pain, improving.    Vital signs stable.  Comfortable.  RUQ abd tenderness    Labs reviewed.    Mildly elevated lipase improving.  Minimally elevated alk phosphatase, normal bilirubin.    Imaging reviewed  Suboptimal transabdominal ultrasound, but no biliary dilation.  CT scan demonstrates pericholecystic fluid and distended gallbladder    Impression:    #1. Right upper quadrant abdominal pain, starting after surgery for perforated diverticulitis  #2. Elevated alkaline phosphatase, other LFTs normal  #3. Mildly elevated lipase, improving  #4. CT and ultrasound imaging demonstrated abnormal gallbladder, normal pancreas, normal bile ducts    Recommendations:    #1. Follow CBC/LFTs  #2. MRI/MRCP with or without contrast to look for evidence of pancreatitis and choledocholithiasis    Discussed with surgery attending and team Dr. MARTINES

## 2020-02-11 VITALS
TEMPERATURE: 98 F | SYSTOLIC BLOOD PRESSURE: 109 MMHG | HEART RATE: 73 BPM | DIASTOLIC BLOOD PRESSURE: 72 MMHG | OXYGEN SATURATION: 98 % | RESPIRATION RATE: 18 BRPM

## 2020-02-11 LAB
ALBUMIN SERPL ELPH-MCNC: 2.5 G/DL — LOW (ref 3.3–5)
ALP SERPL-CCNC: 144 U/L — HIGH (ref 40–120)
ALT FLD-CCNC: 14 U/L — SIGNIFICANT CHANGE UP (ref 10–45)
ANION GAP SERPL CALC-SCNC: 11 MMOL/L — SIGNIFICANT CHANGE UP (ref 5–17)
AST SERPL-CCNC: 20 U/L — SIGNIFICANT CHANGE UP (ref 10–40)
BILIRUB SERPL-MCNC: 0.6 MG/DL — SIGNIFICANT CHANGE UP (ref 0.2–1.2)
BUN SERPL-MCNC: 21 MG/DL — SIGNIFICANT CHANGE UP (ref 7–23)
CALCIUM SERPL-MCNC: 8.5 MG/DL — SIGNIFICANT CHANGE UP (ref 8.4–10.5)
CHLORIDE SERPL-SCNC: 100 MMOL/L — SIGNIFICANT CHANGE UP (ref 96–108)
CO2 SERPL-SCNC: 24 MMOL/L — SIGNIFICANT CHANGE UP (ref 22–31)
CREAT SERPL-MCNC: 0.93 MG/DL — SIGNIFICANT CHANGE UP (ref 0.5–1.3)
GLUCOSE SERPL-MCNC: 98 MG/DL — SIGNIFICANT CHANGE UP (ref 70–99)
HCT VFR BLD CALC: 26.2 % — LOW (ref 34.5–45)
HGB BLD-MCNC: 8 G/DL — LOW (ref 11.5–15.5)
LIDOCAIN IGE QN: 70 U/L — HIGH (ref 7–60)
MAGNESIUM SERPL-MCNC: 2 MG/DL — SIGNIFICANT CHANGE UP (ref 1.6–2.6)
MCHC RBC-ENTMCNC: 29.1 PG — SIGNIFICANT CHANGE UP (ref 27–34)
MCHC RBC-ENTMCNC: 30.5 GM/DL — LOW (ref 32–36)
MCV RBC AUTO: 95.3 FL — SIGNIFICANT CHANGE UP (ref 80–100)
NRBC # BLD: 0 /100 WBCS — SIGNIFICANT CHANGE UP (ref 0–0)
PHOSPHATE SERPL-MCNC: 3.1 MG/DL — SIGNIFICANT CHANGE UP (ref 2.5–4.5)
PLATELET # BLD AUTO: 457 K/UL — HIGH (ref 150–400)
POTASSIUM SERPL-MCNC: 4.3 MMOL/L — SIGNIFICANT CHANGE UP (ref 3.5–5.3)
POTASSIUM SERPL-SCNC: 4.3 MMOL/L — SIGNIFICANT CHANGE UP (ref 3.5–5.3)
PROT SERPL-MCNC: 5.5 G/DL — LOW (ref 6–8.3)
RBC # BLD: 2.75 M/UL — LOW (ref 3.8–5.2)
RBC # FLD: 16.8 % — HIGH (ref 10.3–14.5)
SODIUM SERPL-SCNC: 135 MMOL/L — SIGNIFICANT CHANGE UP (ref 135–145)
WBC # BLD: 9.56 K/UL — SIGNIFICANT CHANGE UP (ref 3.8–10.5)
WBC # FLD AUTO: 9.56 K/UL — SIGNIFICANT CHANGE UP (ref 3.8–10.5)

## 2020-02-11 PROCEDURE — 87186 SC STD MICRODIL/AGAR DIL: CPT

## 2020-02-11 PROCEDURE — 97165 OT EVAL LOW COMPLEX 30 MIN: CPT

## 2020-02-11 PROCEDURE — 82150 ASSAY OF AMYLASE: CPT

## 2020-02-11 PROCEDURE — 96374 THER/PROPH/DIAG INJ IV PUSH: CPT

## 2020-02-11 PROCEDURE — 86901 BLOOD TYPING SEROLOGIC RH(D): CPT

## 2020-02-11 PROCEDURE — 36430 TRANSFUSION BLD/BLD COMPNT: CPT

## 2020-02-11 PROCEDURE — 97605 NEG PRS WND THER DME<=50SQCM: CPT

## 2020-02-11 PROCEDURE — 99238 HOSP IP/OBS DSCHRG MGMT 30/<: CPT

## 2020-02-11 PROCEDURE — 93005 ELECTROCARDIOGRAM TRACING: CPT

## 2020-02-11 PROCEDURE — 85730 THROMBOPLASTIN TIME PARTIAL: CPT

## 2020-02-11 PROCEDURE — 88302 TISSUE EXAM BY PATHOLOGIST: CPT

## 2020-02-11 PROCEDURE — 88305 TISSUE EXAM BY PATHOLOGIST: CPT

## 2020-02-11 PROCEDURE — 85014 HEMATOCRIT: CPT

## 2020-02-11 PROCEDURE — 83605 ASSAY OF LACTIC ACID: CPT

## 2020-02-11 PROCEDURE — 73630 X-RAY EXAM OF FOOT: CPT

## 2020-02-11 PROCEDURE — 71260 CT THORAX DX C+: CPT

## 2020-02-11 PROCEDURE — 99232 SBSQ HOSP IP/OBS MODERATE 35: CPT | Mod: GC

## 2020-02-11 PROCEDURE — 71045 X-RAY EXAM CHEST 1 VIEW: CPT

## 2020-02-11 PROCEDURE — 82947 ASSAY GLUCOSE BLOOD QUANT: CPT

## 2020-02-11 PROCEDURE — 87486 CHLMYD PNEUM DNA AMP PROBE: CPT

## 2020-02-11 PROCEDURE — 87075 CULTR BACTERIA EXCEPT BLOOD: CPT

## 2020-02-11 PROCEDURE — 84295 ASSAY OF SERUM SODIUM: CPT

## 2020-02-11 PROCEDURE — 97164 PT RE-EVAL EST PLAN CARE: CPT

## 2020-02-11 PROCEDURE — 78226 HEPATOBILIARY SYSTEM IMAGING: CPT

## 2020-02-11 PROCEDURE — 87102 FUNGUS ISOLATION CULTURE: CPT

## 2020-02-11 PROCEDURE — 80053 COMPREHEN METABOLIC PANEL: CPT

## 2020-02-11 PROCEDURE — 87070 CULTURE OTHR SPECIMN AEROBIC: CPT

## 2020-02-11 PROCEDURE — 81001 URINALYSIS AUTO W/SCOPE: CPT

## 2020-02-11 PROCEDURE — 99285 EMERGENCY DEPT VISIT HI MDM: CPT | Mod: 25

## 2020-02-11 PROCEDURE — 74177 CT ABD & PELVIS W/CONTRAST: CPT

## 2020-02-11 PROCEDURE — A9537: CPT

## 2020-02-11 PROCEDURE — 97116 GAIT TRAINING THERAPY: CPT

## 2020-02-11 PROCEDURE — 84100 ASSAY OF PHOSPHORUS: CPT

## 2020-02-11 PROCEDURE — 86923 COMPATIBILITY TEST ELECTRIC: CPT

## 2020-02-11 PROCEDURE — 80076 HEPATIC FUNCTION PANEL: CPT

## 2020-02-11 PROCEDURE — P9016: CPT

## 2020-02-11 PROCEDURE — 74018 RADEX ABDOMEN 1 VIEW: CPT

## 2020-02-11 PROCEDURE — 85027 COMPLETE CBC AUTOMATED: CPT

## 2020-02-11 PROCEDURE — 87040 BLOOD CULTURE FOR BACTERIA: CPT

## 2020-02-11 PROCEDURE — 97530 THERAPEUTIC ACTIVITIES: CPT

## 2020-02-11 PROCEDURE — 87205 SMEAR GRAM STAIN: CPT

## 2020-02-11 PROCEDURE — 82962 GLUCOSE BLOOD TEST: CPT

## 2020-02-11 PROCEDURE — 94002 VENT MGMT INPAT INIT DAY: CPT

## 2020-02-11 PROCEDURE — 88307 TISSUE EXAM BY PATHOLOGIST: CPT

## 2020-02-11 PROCEDURE — 93971 EXTREMITY STUDY: CPT

## 2020-02-11 PROCEDURE — 73560 X-RAY EXAM OF KNEE 1 OR 2: CPT

## 2020-02-11 PROCEDURE — 82330 ASSAY OF CALCIUM: CPT

## 2020-02-11 PROCEDURE — 85610 PROTHROMBIN TIME: CPT

## 2020-02-11 PROCEDURE — C1889: CPT

## 2020-02-11 PROCEDURE — 86900 BLOOD TYPING SEROLOGIC ABO: CPT

## 2020-02-11 PROCEDURE — 97110 THERAPEUTIC EXERCISES: CPT

## 2020-02-11 PROCEDURE — 76700 US EXAM ABDOM COMPLETE: CPT

## 2020-02-11 PROCEDURE — 87798 DETECT AGENT NOS DNA AMP: CPT

## 2020-02-11 PROCEDURE — 83735 ASSAY OF MAGNESIUM: CPT

## 2020-02-11 PROCEDURE — 73610 X-RAY EXAM OF ANKLE: CPT

## 2020-02-11 PROCEDURE — 82435 ASSAY OF BLOOD CHLORIDE: CPT

## 2020-02-11 PROCEDURE — 87581 M.PNEUMON DNA AMP PROBE: CPT

## 2020-02-11 PROCEDURE — 80202 ASSAY OF VANCOMYCIN: CPT

## 2020-02-11 PROCEDURE — 80048 BASIC METABOLIC PNL TOTAL CA: CPT

## 2020-02-11 PROCEDURE — 74181 MRI ABDOMEN W/O CONTRAST: CPT

## 2020-02-11 PROCEDURE — 82803 BLOOD GASES ANY COMBINATION: CPT

## 2020-02-11 PROCEDURE — 84145 PROCALCITONIN (PCT): CPT

## 2020-02-11 PROCEDURE — 87633 RESP VIRUS 12-25 TARGETS: CPT

## 2020-02-11 PROCEDURE — 86850 RBC ANTIBODY SCREEN: CPT

## 2020-02-11 PROCEDURE — 83690 ASSAY OF LIPASE: CPT

## 2020-02-11 PROCEDURE — 97161 PT EVAL LOW COMPLEX 20 MIN: CPT

## 2020-02-11 PROCEDURE — 84132 ASSAY OF SERUM POTASSIUM: CPT

## 2020-02-11 RX ORDER — POLYETHYLENE GLYCOL 3350 17 G/17G
17 POWDER, FOR SOLUTION ORAL
Qty: 0 | Refills: 0 | DISCHARGE
Start: 2020-02-11

## 2020-02-11 RX ORDER — OXYCODONE HYDROCHLORIDE 5 MG/1
1 TABLET ORAL
Qty: 10 | Refills: 0
Start: 2020-02-11

## 2020-02-11 RX ORDER — ACETAMINOPHEN 500 MG
3 TABLET ORAL
Qty: 0 | Refills: 0 | DISCHARGE
Start: 2020-02-11

## 2020-02-11 RX ORDER — OXYCODONE HYDROCHLORIDE 5 MG/1
1 TABLET ORAL
Qty: 0 | Refills: 0 | DISCHARGE

## 2020-02-11 RX ADMIN — PANTOPRAZOLE SODIUM 40 MILLIGRAM(S): 20 TABLET, DELAYED RELEASE ORAL at 05:41

## 2020-02-11 RX ADMIN — DEXTROSE MONOHYDRATE, SODIUM CHLORIDE, AND POTASSIUM CHLORIDE 75 MILLILITER(S): 50; .745; 4.5 INJECTION, SOLUTION INTRAVENOUS at 09:00

## 2020-02-11 RX ADMIN — SERTRALINE 150 MILLIGRAM(S): 25 TABLET, FILM COATED ORAL at 12:32

## 2020-02-11 RX ADMIN — OXYCODONE HYDROCHLORIDE 5 MILLIGRAM(S): 5 TABLET ORAL at 07:49

## 2020-02-11 NOTE — PROGRESS NOTE ADULT - ASSESSMENT
75yo F with no PMHx and PSHx of R TKR 15 years ago and left TKR (1/24) at [place] presenting with 4 days of abdominal pain and distension w/ hx of diverticulosis found to have significant pneumoperitoneum on CTAP concerning for likely perforated diverticulosis, s/p exploratory laparotomy, Salas procedure and left oophorectomy.    # Abdominal pain  US abdomen showing distended gallbladder with sludge, minimally elevated alk phos but rest of LFTs WNL, elevated lipase of 200  MRI/MRCP done showing no CBD dilation or pancreatitis  DDx: post surgical abdominal pain, acute cholecystitis, biliary colic, functional abdominal pain  # Perforated diverticulum s/p alexandrea procedure  # TKR    Recommendation:  - rest of plan as per surgery and primary team  - no role for endoscopic evaluation or therapeutic procedure at this time  - please call back with additional questions or concerns

## 2020-02-11 NOTE — PROGRESS NOTE ADULT - PROVIDER SPECIALTY LIST ADULT
Gastroenterology
SICU
Surgery

## 2020-02-11 NOTE — PROGRESS NOTE ADULT - REASON FOR ADMISSION
Abdominal pain

## 2020-02-11 NOTE — PROGRESS NOTE ADULT - ATTENDING COMMENTS
MRCP normal.  No endoscopic intervention needed.
Pt seen and examined, agree with above. Pt feeling well, tolerating diet, having ostomy function. Wound with wound vac in place. Pending ostomy RN education, VNS, and outpatient PT set up. Discussed with patient and her daughter at bedside.
Pt seen and examined, agree with above. Pt feeling well, tolerating po, ambulating. Abd soft, wound vac in place, ostomy with some gas. However, her WBC is increased to 14K today. I explained this finding to the patient and her family, and explained that this may be of no significance, and her options, as she is otherwise doing well and ready to go home, include going home with the small risk that if there is an infection "brewing," she may need to come back to the ED; she can stay until tomorrow and we can repeat the WBC, and if it is normal, d/c home and if elevated, repeat CT, or repeat CT today. If the CT is normal, will discharge despite elevated WBC. Pt and her daughter prefer the last choice, which is also what I recommend. Will obtain CT today.
Pt seen and examined. Chart reviewed. Resident note confirmed. Pt is a 74 year old female with no signficant medical history who presented to [facility] with perforated diverticulitis. She underwent a Luisana's procedure. No intraoperative events. She remains in high output renal failure with a metabolic acidosis. Pt continues on a bicarb drip as therapy. No acute events overnight.     PMH/PSH/MEDS/ALL/SH/FH/ROS:  Unchanged from H&P  Vitals/PE/Labs/radiographs:  Reviewed      A/p    Neuro:	Post operative pain  	Continue pain control    CVS:	Resolving sepsis  	Hypervolemia  	Lasix as needed    Pulm:	Atelectasis  	Continue ISP    GI:	S/p Luisana's procedure  	Continue clears  	Await return of bowel fcn    :	HARJIT  	Metabolic acidosis  	Continue bicarb drip  	Monitor I's and O's  	  Heme:	Acute blood loss anema  	Monitor H/h    ID:	Perforated diverticulitis  	Resolving sepsis  	Continue zosyn   	F/u cultures    Endo:	Continue glycemic control
looks well  the ostomy is functioning  incision dressed clean  G.I. recommending MRCP
patient is doing well  anticipate transfer to floor today or tomorrow
Evaluated in round  S/p Salas for perforated diverticulitis    Awake and alert, pain well controlled  Acute resp failure resolved, saturating well  Hemodynamically stable, remains off pressure  NPO, awaiting return of bowel function  Worsening of metabolic acidosis, will resume half strength bicarb, HARJIT on CKD improving  Resolving oliguria  LA cleared  Course of abx Zosyn, surgical culture GNR and and E fecalis likjely sensitive to PCN, will Dc Vanco and continue Zosyn  CXR basal atelectasis, continue pul toileting, mobilization  Spoke to family
Evaluated in round  S/p Salas for perforated diverticulitis  Awake and alert, pain well controlled  Acute resp failure s/p extubated tolerating well  Hemodynamically stable, remains off pressure  NPO, awaiting return of bowel function  Resolving metabolic acidosis, DC bicarb drip  Resolving oliguria, continue to monitor HARJIT  LA cleared  Course of abx Zosyn, surgical culture GPC in pair and chain, continue Vanco based on level,   CXR basal atelectasis, needs good pul toileting  Spoke to family
Pt seen and examined. Chart reviewed. Resident note confirmed. Pt is a 74 year old female with no signficant medical history who presented to [facility] with perforated diverticulitis. She underwent a Luisana's procedure. No intraoperative events. Her HARJIT continues to resolve and her metabolic acidosis has resolved. Her bicarb drip has been discontinued. No acute events overnight.     PMH/PSH/MEDS/ALL/SH/FH/ROS:  Unchanged from H&P  Vitals/PE/Labs/radiographs:  Reviewed      A/p    Neuro:	Post operative pain  	Continue pain control    CVS:	S/p sepsis  	Hypervolemia improved  	Lasix as needed    Pulm:	Atelectasis  	Continue ISP    GI:	S/p Luisana's procedure  	Return of bowel fcn  	Adv diet    :	Resolving HARJIT  	Replace lytes  	Monitor I's and O's  	  Heme:	Acute blood loss anema  	Monitor H/h    ID:	Perforated diverticulitis  	Resolving sepsis  	Continue zosyn   	F/u cultures    Endo:	Continue glycemic control.

## 2020-02-11 NOTE — PROGRESS NOTE ADULT - ASSESSMENT
74F with no PMHx and PSHx of R TKR 15 years ago and left TKR this past Friday (1/24) at [place] presenting with 4 days of abdominal pain and distension in the setting of constipation and a history of diverticulosis. Found to have significant pneumoperitoneum on CTAP concerning for perforated bowel, although the source of the perforation remains unclear, it is likely from diverticuli. Now s/p exploratory laparotomy, Salas procedure and left oophorectomy (1/30).     - F/U XR left foot/ ankle for "dead leg" sensation  - Pain control as needed   - DVT ppx  - OOB/ ambulate  - regular diet   - Dispo planning: home with home PT  - Lipase trending down    [id number]  ACS

## 2020-02-11 NOTE — PROGRESS NOTE ADULT - SUBJECTIVE AND OBJECTIVE BOX
SURGERY DAILY PROGRESS NOTE:       SUBJECTIVE/ROS:  Patient seen and evaluated at the bedside on AM rounds. Patient doing well. Still has some mild abdominal tightness in upper abdomen which she attributes to the VAC. Gas and liquid stool in ostomy appliance. Tolerating a regular diet. Denies nausea, vomiting, chest pain, shortness of breath. GI recommending MRCP to evaluate biliary tree yesterday which was negative for cholecystitis.     Vital Signs Last 24 Hrs  T(C): 36.7 (11 Feb 2020 10:24), Max: 36.9 (10 Feb 2020 21:30)  T(F): 98.1 (11 Feb 2020 10:24), Max: 98.4 (10 Feb 2020 21:30)  HR: 73 (11 Feb 2020 10:24) (72 - 77)  BP: 109/72 (11 Feb 2020 10:24) (100/62 - 134/79)  BP(mean): --  RR: 18 (11 Feb 2020 10:24) (16 - 18)  SpO2: 98% (11 Feb 2020 10:24) (94% - 98%)        PHYSICAL EXAM:  General: NAD  HEENT: NC/AT  Pulmonary: Breathing comfortably on RA  Cardiovascular: NSR, no murmurs  Abdominal: soft, non distended, mild ruq tenderness to palpation, ostomy pink with gas and stool in the ostomy bag, midline wound vac in place with good suction (removed this morning on rounds)   Extremities: Knee incision c/d/i

## 2020-02-27 ENCOUNTER — APPOINTMENT (OUTPATIENT)
Dept: TRAUMA SURGERY | Facility: CLINIC | Age: 75
End: 2020-02-27
Payer: MEDICARE

## 2020-02-27 VITALS
BODY MASS INDEX: 23.04 KG/M2 | SYSTOLIC BLOOD PRESSURE: 115 MMHG | HEART RATE: 90 BPM | WEIGHT: 130 LBS | DIASTOLIC BLOOD PRESSURE: 70 MMHG | TEMPERATURE: 97.5 F | HEIGHT: 63 IN

## 2020-02-27 PROCEDURE — 99024 POSTOP FOLLOW-UP VISIT: CPT

## 2020-02-27 NOTE — HISTORY OF PRESENT ILLNESS
[de-identified] : discharged home on 2/11 with wet-to-dry dressings for laparotomy wound\par pathology was consistent with perforated diverticulitis\par the incidental left ovarian mass was a fibroma\par Her last colonoscopy (2015 by [facility]) and only demonstrated diverticulosis\par  [de-identified] : tolerating regular diet without nausea, vomiting or abdominal pain.\par no fevers or chills.\par She has hard stool which sticks to the colostomy. I recommended continuing high fiber diet, but drinking plenty of water.\par \par She continues home PT for her recent left TKA (1/24/2020 @ [facility]par VNS visits her every other day for colostomy care and teaching.\par She has a 24 hour aid at her house who assists her with wound care.\par

## 2020-02-27 NOTE — PLAN
[FreeTextEntry1] : She is can undergo colonoscopy (via anus and colostomy) in preparation for colostomy reversal.

## 2020-02-27 NOTE — PHYSICAL EXAM
[de-identified] : appears well [de-identified] : soft / NT / ND\par I wiped the adherent stool from the colostomy and examined it with the flange in place. It is red and healthy without edema. I suspect it is a little retracted since there is some cohesive around it.\par The 8 cm midline incision is mostly closed. There is 2 mm of hypergranulation tissue at the inferior aspect. There is 2 cm of hypergranulation tissue in the middle of the wound. There is a 2 x 0.5 cm open area at the top of the wound adjacent to the umbilicus which is clean, superficial and well granulated.

## 2020-02-29 LAB
CULTURE RESULTS: SIGNIFICANT CHANGE UP
SPECIMEN SOURCE: SIGNIFICANT CHANGE UP

## 2020-03-12 ENCOUNTER — APPOINTMENT (OUTPATIENT)
Dept: TRAUMA SURGERY | Facility: CLINIC | Age: 75
End: 2020-03-12
Payer: MEDICARE

## 2020-03-12 VITALS
SYSTOLIC BLOOD PRESSURE: 108 MMHG | BODY MASS INDEX: 23.04 KG/M2 | HEIGHT: 63 IN | HEART RATE: 92 BPM | DIASTOLIC BLOOD PRESSURE: 68 MMHG | WEIGHT: 130 LBS | TEMPERATURE: 97.4 F

## 2020-03-12 DIAGNOSIS — K57.20 DIVERTICULITIS OF LARGE INTESTINE WITH PERFORATION AND ABSCESS W/OUT BLEEDING: ICD-10-CM

## 2020-03-12 PROCEDURE — 99024 POSTOP FOLLOW-UP VISIT: CPT

## 2020-03-12 NOTE — HISTORY OF PRESENT ILLNESS
[de-identified] : Last seen in the office on 2/27 where the wound was found to be healing well, but with some hypergranulation tissue for which I recommended silver nitrate.\par  [de-identified] : VNS has not been able to apply silver nitrate. Still using calcium alginate dressings.\par She is able to care for her colostomy, now using a one-piece appliance.\par Stools are now softer and not caking up on the colostomy.\par no fevers or chills.

## 2020-03-12 NOTE — PLAN
[FreeTextEntry1] : I offered to open the area of the incision at the location of the suture sinus or try ABx. She prefers ABx.\par No longer needs calcium alginate.

## 2020-03-12 NOTE — PHYSICAL EXAM
[de-identified] : soft / NT / ND. colostomy appears healthy (in the photo that she showed me).\par 12 mm area of hypergranulation tissue in the middle of the wound. 3 cm radius of surrounding erythema, but no warmth, tenderness or discharge.

## 2020-03-26 ENCOUNTER — APPOINTMENT (OUTPATIENT)
Dept: TRAUMA SURGERY | Facility: CLINIC | Age: 75
End: 2020-03-26

## 2020-06-08 ENCOUNTER — APPOINTMENT (OUTPATIENT)
Dept: COLORECTAL SURGERY | Facility: CLINIC | Age: 75
End: 2020-06-08
Payer: MEDICARE

## 2020-06-08 VITALS
DIASTOLIC BLOOD PRESSURE: 73 MMHG | TEMPERATURE: 36.4 F | SYSTOLIC BLOOD PRESSURE: 122 MMHG | HEART RATE: 64 BPM | WEIGHT: 132 LBS | RESPIRATION RATE: 14 BRPM | HEIGHT: 63 IN | BODY MASS INDEX: 23.39 KG/M2

## 2020-06-08 DIAGNOSIS — Z82.61 FAMILY HISTORY OF ARTHRITIS: ICD-10-CM

## 2020-06-08 PROCEDURE — 99204 OFFICE O/P NEW MOD 45 MIN: CPT

## 2020-06-08 RX ORDER — BIOTIN 10 MG
TABLET ORAL
Refills: 0 | Status: ACTIVE | COMMUNITY

## 2020-06-08 RX ORDER — OXYCODONE HCL/ACETAMINOPHEN 5 MG-500MG
5-500 CAPSULE ORAL
Refills: 0 | Status: DISCONTINUED | COMMUNITY
End: 2020-06-08

## 2020-06-08 RX ORDER — AMOXICILLIN AND CLAVULANATE POTASSIUM 875; 125 MG/1; MG/1
875-125 TABLET, COATED ORAL TWICE DAILY
Qty: 14 | Refills: 0 | Status: DISCONTINUED | COMMUNITY
Start: 2020-03-12 | End: 2020-06-08

## 2020-06-08 RX ORDER — LORATADINE 5 MG
TABLET,CHEWABLE ORAL
Refills: 0 | Status: ACTIVE | COMMUNITY

## 2020-06-08 NOTE — HISTORY OF PRESENT ILLNESS
[FreeTextEntry1] : 75yo WF s/p TKR January 2020.  Pt developed abdominal pain soon after discharge, which she thought was result of postop narcotic pain medication.  Seen by PMD.  CT A&P advised. This revealed "free air" and pt was directed to [facility] ER.  On January 30 pt underwent urgent Luisana procedure for perforating diverticulitis.  Hosp x2 weeks.  Discharged with wound VAC.\par \par Patient will occasionally see blood from around stoma.  Presently poor fiber intake, fair fluid intake.  Miralax/Colace daily.  Remains "constipated".  \par \par Advised to f/u with colorectal surgeon for reversal (@6mos from [facility]).

## 2020-06-08 NOTE — REVIEW OF SYSTEMS
[Loss Of Hearing] : hearing loss [SOB on Exertion] : shortness of breath during exertion [As Noted in HPI] : as noted in HPI [Easy Bruising] : a tendency for easy bruising [Negative] : Endocrine [Eye Pain] : no eye pain [Chest Pain] : no chest pain [Lower Ext Edema] : no lower extremity edema [FreeTextEntry4] : hearing aids [Easy Bleeding] : no tendency for easy bleeding [FreeTextEntry9] : back pain.  Ambulates with cane

## 2020-06-08 NOTE — PHYSICAL EXAM
[Normal Breath Sounds] : Normal breath sounds [Normal Heart Sounds] : normal heart sounds [Normal Rate and Rhythm] : normal rate and rhythm [No Edema] : No edema [Alert] : alert [Oriented to Person] : oriented to person [Oriented to Place] : oriented to place [Oriented to Time] : oriented to time [Anxious] : anxious [de-identified] : round soft +BS NT/ND long midline scar [de-identified] : +ROM [de-identified] : NC/AT [de-identified] : intact

## 2020-06-08 NOTE — ASSESSMENT
[FreeTextEntry1] : 74-year-old pleasant white female who presents for consultation regarding Luisana reversal.\par \par Patient had undergone a total knee replacement in January and was given some postoperative pain medication. Subsequently she developed abdominal pain which she thought was related to the medication. She was sent for a CAT scan which revealed free air and emergently went to the hospital. She had peritonitis and was taken urgently to the operating room for a Luisana procedure secondary to perforated diverticulitis with feculent peritonitis. She also had an umbilical hernia present. Patient spent a few days in the ICU and a few days on the ventilator. Currently she is almost 5 months status post Luisana and looks and feels well.\par \par She is relatively healthy with no significant past medical issues. She has had multiple orthopedic procedures including bilateral total knee replacements. She has also had hammertoe surgery. She has back issues.\par \par Physical examination reveals a very pleasant elderly female. She has a lower midline scar present from the umbilicus to the pubic bone. She has a left lower quadrant end colostomy with parastomal hernia present.\par \par Patient requires a preoperative colonoscopy via the ostomy and anus. I would then plan an elective takedown of colostomy with colorectal anastomosis and repair of her peristomal hernia. We discussed all aspects of the surgery including anticipated operative time, hospital stay and time to complete recuperation. Risks, alternatives and benefits including but not limited to an anastomotic leak, wound infection and deep venous thrombosis were discussed.\par \par All questions were answered and she wishes to proceed.

## 2020-07-18 DIAGNOSIS — Z01.818 ENCOUNTER FOR OTHER PREPROCEDURAL EXAMINATION: ICD-10-CM

## 2020-07-19 ENCOUNTER — APPOINTMENT (OUTPATIENT)
Dept: DISASTER EMERGENCY | Facility: CLINIC | Age: 75
End: 2020-07-19

## 2020-07-20 LAB — SARS-COV-2 N GENE NPH QL NAA+PROBE: NOT DETECTED

## 2020-07-22 ENCOUNTER — APPOINTMENT (OUTPATIENT)
Dept: COLORECTAL SURGERY | Facility: CLINIC | Age: 75
End: 2020-07-22
Payer: MEDICARE

## 2020-07-22 PROCEDURE — 44388 COLONOSCOPY THRU STOMA SPX: CPT

## 2020-07-22 PROCEDURE — 45378 DIAGNOSTIC COLONOSCOPY: CPT

## 2020-08-20 ENCOUNTER — OUTPATIENT (OUTPATIENT)
Dept: OUTPATIENT SERVICES | Facility: HOSPITAL | Age: 75
LOS: 1 days | End: 2020-08-20
Payer: MEDICARE

## 2020-08-20 VITALS
RESPIRATION RATE: 16 BRPM | SYSTOLIC BLOOD PRESSURE: 120 MMHG | WEIGHT: 130.07 LBS | HEIGHT: 63 IN | DIASTOLIC BLOOD PRESSURE: 81 MMHG | HEART RATE: 68 BPM | OXYGEN SATURATION: 100 % | TEMPERATURE: 98 F

## 2020-08-20 DIAGNOSIS — K57.20 DIVERTICULITIS OF LARGE INTESTINE WITH PERFORATION AND ABSCESS WITHOUT BLEEDING: ICD-10-CM

## 2020-08-20 DIAGNOSIS — Z98.49 CATARACT EXTRACTION STATUS, UNSPECIFIED EYE: Chronic | ICD-10-CM

## 2020-08-20 DIAGNOSIS — Z98.890 OTHER SPECIFIED POSTPROCEDURAL STATES: Chronic | ICD-10-CM

## 2020-08-20 DIAGNOSIS — Z29.9 ENCOUNTER FOR PROPHYLACTIC MEASURES, UNSPECIFIED: ICD-10-CM

## 2020-08-20 DIAGNOSIS — Z96.652 PRESENCE OF LEFT ARTIFICIAL KNEE JOINT: Chronic | ICD-10-CM

## 2020-08-20 DIAGNOSIS — Z01.818 ENCOUNTER FOR OTHER PREPROCEDURAL EXAMINATION: ICD-10-CM

## 2020-08-20 DIAGNOSIS — Z96.651 PRESENCE OF RIGHT ARTIFICIAL KNEE JOINT: Chronic | ICD-10-CM

## 2020-08-20 LAB
ANION GAP SERPL CALC-SCNC: 13 MMOL/L — SIGNIFICANT CHANGE UP (ref 5–17)
BLD GP AB SCN SERPL QL: POSITIVE — SIGNIFICANT CHANGE UP
BUN SERPL-MCNC: 34 MG/DL — HIGH (ref 7–23)
CALCIUM SERPL-MCNC: 10 MG/DL — SIGNIFICANT CHANGE UP (ref 8.4–10.5)
CHLORIDE SERPL-SCNC: 104 MMOL/L — SIGNIFICANT CHANGE UP (ref 96–108)
CO2 SERPL-SCNC: 21 MMOL/L — LOW (ref 22–31)
CREAT SERPL-MCNC: 1.06 MG/DL — SIGNIFICANT CHANGE UP (ref 0.5–1.3)
GLUCOSE SERPL-MCNC: 79 MG/DL — SIGNIFICANT CHANGE UP (ref 70–99)
HCT VFR BLD CALC: 37.8 % — SIGNIFICANT CHANGE UP (ref 34.5–45)
HGB BLD-MCNC: 12.1 G/DL — SIGNIFICANT CHANGE UP (ref 11.5–15.5)
MCHC RBC-ENTMCNC: 29.4 PG — SIGNIFICANT CHANGE UP (ref 27–34)
MCHC RBC-ENTMCNC: 32 GM/DL — SIGNIFICANT CHANGE UP (ref 32–36)
MCV RBC AUTO: 92 FL — SIGNIFICANT CHANGE UP (ref 80–100)
NRBC # BLD: 0 /100 WBCS — SIGNIFICANT CHANGE UP (ref 0–0)
PLATELET # BLD AUTO: 192 K/UL — SIGNIFICANT CHANGE UP (ref 150–400)
POTASSIUM SERPL-MCNC: 5.1 MMOL/L — SIGNIFICANT CHANGE UP (ref 3.5–5.3)
POTASSIUM SERPL-SCNC: 5.1 MMOL/L — SIGNIFICANT CHANGE UP (ref 3.5–5.3)
RBC # BLD: 4.11 M/UL — SIGNIFICANT CHANGE UP (ref 3.8–5.2)
RBC # FLD: 15.6 % — HIGH (ref 10.3–14.5)
RH IG SCN BLD-IMP: POSITIVE — SIGNIFICANT CHANGE UP
SODIUM SERPL-SCNC: 138 MMOL/L — SIGNIFICANT CHANGE UP (ref 135–145)
WBC # BLD: 5.53 K/UL — SIGNIFICANT CHANGE UP (ref 3.8–10.5)
WBC # FLD AUTO: 5.53 K/UL — SIGNIFICANT CHANGE UP (ref 3.8–10.5)

## 2020-08-20 PROCEDURE — 86850 RBC ANTIBODY SCREEN: CPT

## 2020-08-20 PROCEDURE — G0463: CPT

## 2020-08-20 PROCEDURE — 86860 RBC ANTIBODY ELUTION: CPT

## 2020-08-20 PROCEDURE — 86900 BLOOD TYPING SEROLOGIC ABO: CPT

## 2020-08-20 PROCEDURE — 86870 RBC ANTIBODY IDENTIFICATION: CPT

## 2020-08-20 PROCEDURE — 86901 BLOOD TYPING SEROLOGIC RH(D): CPT

## 2020-08-20 PROCEDURE — 86905 BLOOD TYPING RBC ANTIGENS: CPT

## 2020-08-20 PROCEDURE — 86880 COOMBS TEST DIRECT: CPT

## 2020-08-20 RX ORDER — SERTRALINE 25 MG/1
150 TABLET, FILM COATED ORAL
Qty: 0 | Refills: 0 | DISCHARGE

## 2020-08-20 RX ORDER — ERGOCALCIFEROL 1.25 MG/1
1 CAPSULE ORAL
Qty: 0 | Refills: 0 | DISCHARGE

## 2020-08-20 RX ORDER — OMEGA-3 ACID ETHYL ESTERS 1 G
1 CAPSULE ORAL
Qty: 0 | Refills: 0 | DISCHARGE

## 2020-08-20 NOTE — H&P PST ADULT - NEGATIVE ALLERGY TYPES
no reactions to food/no reactions to animals/no outdoor environmental allergies/no indoor environmental allergies/no reactions to medicines

## 2020-08-20 NOTE — H&P PST ADULT - HISTORY OF PRESENT ILLNESS
Mrs. Rowe is a 74 year old with PMH of Spinal stenosis, SPL, SOB with activity and OA s/p bilateral knee replacement most recent 1/27/2020 and 48 hours later had acute diverticulitis with large intestinal perforation and abscess without bleed s/p Salas procedure returns for scheduled Cystoscopy insertion of ureteral catheters, ERAS, reversal of Salas.        COVID testing is scheduled for 8/24/2020.

## 2020-08-20 NOTE — H&P PST ADULT - NSICDXPASTMEDICALHX_GEN_ALL_CORE_FT
PAST MEDICAL HISTORY:  Acute diverticulitis 1/29/2020 s/p surgery s/p colostomy    Anxiety     H/O scoliosis     Hard of hearing     OA (osteoarthritis)     Osteopenia     Spinal stenosis     SPL (spondylolisthesis)

## 2020-08-20 NOTE — H&P PST ADULT - ASSESSMENT
CAPRINI SCORE [CLOT]    AGE RELATED RISK FACTORS                                                       MOBILITY RELATED FACTORS  [ ] Age 41-60 years                                            (1 Point)                  [ ] Bed rest                                                        (1 Point)  [ ] Age: 61-74 years                                           (2 Points)                 [ ] Plaster cast                                                   (2 Points)  [ x] Age= 75 years                                              (3 Points)                 [ ] Bed bound for more than 72 hours                 (2 Points)    DISEASE RELATED RISK FACTORS                                               GENDER SPECIFIC FACTORS  [ ] Edema in the lower extremities                       (1 Point)                  [ ] Pregnancy                                                     (1 Point)  [ ] Varicose veins                                               (1 Point)                  [ ] Post-partum < 6 weeks                                   (1 Point)             [ ] BMI > 25 Kg/m2                                            (1 Point)                  [ ] Hormonal therapy  or oral contraception          (1 Point)                 [ ] Sepsis (in the previous month)                        (1 Point)                  [ ] History of pregnancy complications                 (1 point)  [ ] Pneumonia or serious lung disease                                               [ ] Unexplained or recurrent                     (1 Point)           (in the previous month)                               (1 Point)  [ ] Abnormal pulmonary function test                     (1 Point)                 SURGERY RELATED RISK FACTORS  [ ] Acute myocardial infarction                              (1 Point)                 [ ]  Section                                             (1 Point)  [ ] Congestive heart failure (in the previous month)  (1 Point)               [ ] Minor surgery                                                  (1 Point)   [ ] Inflammatory bowel disease                             (1 Point)                 [ ] Arthroscopic surgery                                        (2 Points)  [ ] Central venous access                                      (2 Points)                [x ] General surgery lasting more than 45 minutes   (2 Points)       [ ] Stroke (in the previous month)                          (5 Points)               [ ] Elective arthroplasty                                         (5 Points)                                                                                                                                               HEMATOLOGY RELATED FACTORS                                                 TRAUMA RELATED RISK FACTORS  [ ] Prior episodes of VTE                                     (3 Points)                 [ ] Fracture of the hip, pelvis, or leg                       (5 Points)  [ ] Positive family history for VTE                         (3 Points)                 [ ] Acute spinal cord injury (in the previous month)  (5 Points)  [ ] Prothrombin 81658 A                                     (3 Points)                 [ ] Paralysis  (less than 1 month)                             (5 Points)  [ ] Factor V Leiden                                             (3 Points)                  [ ] Multiple Trauma within 1 month                        (5 Points)  [ ] Lupus anticoagulants                                     (3 Points)                                                           [ ] Anticardiolipin antibodies                               (3 Points)                                                       [ ] High homocysteine in the blood                      (3 Points)                                             [ ] Other congenital or acquired thrombophilia      (3 Points)                                                [ ] Heparin induced thrombocytopenia                  (3 Points)                                          Total Score [   5       ]

## 2020-08-20 NOTE — H&P PST ADULT - NSICDXPROBLEM_GEN_ALL_CORE_FT
PROBLEM DIAGNOSES  Problem: Diverticulitis of large intestine with perforation without bleeding  Assessment and Plan: Scheduled for cystoscopy insertion of ureteral catheters, ERAS, reversal of Salas.  Urine cx pending.  Labs-cbc, bmp, T&S, A1c  Pre-emptive analgesia  Reviewed use of spirometer (she has one at home)  FS DOS upon admission  Requested medical eval    Problem: Need for prophylactic measure  Assessment and Plan: The Caprini score indicates this patient is at risk for a VTE event (score 3-5).  Most surgical patients in this group would benefit from pharmacologic prophylaxis.  The surgical team will determine the balance between VTE risk and bleeding risk

## 2020-08-20 NOTE — H&P PST ADULT - NEUROLOGICAL DETAILS
normal strength/responds to pain/alert and oriented x 3/responds to verbal commands/sensation intact

## 2020-08-20 NOTE — H&P PST ADULT - NSICDXPASTSURGICALHX_GEN_ALL_CORE_FT
PAST SURGICAL HISTORY:  H/O carpal tunnel repair right    H/O total knee replacement, right     S/P cataract extraction and insertion of intraocular lens Bilateral    S/P hammer toe correction right foot    S/P shoulder surgery right shoulder arthroscopy    S/P total knee arthroplasty, right     S/P total knee replacement, left 1/28/2020

## 2020-08-21 LAB
A1C WITH ESTIMATED AVERAGE GLUCOSE RESULT: 5.2 % — SIGNIFICANT CHANGE UP (ref 4–5.6)
CULTURE RESULTS: SIGNIFICANT CHANGE UP
DAT C3-SP REAG RBC QL: POSITIVE — SIGNIFICANT CHANGE UP
DAT POLY-SP REAG RBC QL: POSITIVE — SIGNIFICANT CHANGE UP
DIRECT COOMBS IGG: POSITIVE — SIGNIFICANT CHANGE UP
ELUATE ANTIBODY 1: SIGNIFICANT CHANGE UP
ESTIMATED AVERAGE GLUCOSE: 103 MG/DL — SIGNIFICANT CHANGE UP (ref 68–114)
SPECIMEN SOURCE: SIGNIFICANT CHANGE UP

## 2020-08-22 PROCEDURE — 86077 PHYS BLOOD BANK SERV XMATCH: CPT

## 2020-08-24 ENCOUNTER — APPOINTMENT (OUTPATIENT)
Dept: DISASTER EMERGENCY | Facility: CLINIC | Age: 75
End: 2020-08-24

## 2020-08-25 LAB — SARS-COV-2 N GENE NPH QL NAA+PROBE: NOT DETECTED

## 2020-08-26 ENCOUNTER — TRANSCRIPTION ENCOUNTER (OUTPATIENT)
Age: 75
End: 2020-08-26

## 2020-08-27 ENCOUNTER — INPATIENT (INPATIENT)
Facility: HOSPITAL | Age: 75
LOS: 4 days | Discharge: HOME | DRG: 330 | End: 2020-09-01
Attending: SURGERY | Admitting: SURGERY
Payer: MEDICARE

## 2020-08-27 ENCOUNTER — RESULT REVIEW (OUTPATIENT)
Age: 75
End: 2020-08-27

## 2020-08-27 ENCOUNTER — APPOINTMENT (OUTPATIENT)
Dept: COLORECTAL SURGERY | Facility: HOSPITAL | Age: 75
End: 2020-08-27
Payer: MEDICARE

## 2020-08-27 VITALS
HEIGHT: 63 IN | WEIGHT: 130.07 LBS | OXYGEN SATURATION: 100 % | HEART RATE: 74 BPM | SYSTOLIC BLOOD PRESSURE: 110 MMHG | TEMPERATURE: 98 F | DIASTOLIC BLOOD PRESSURE: 72 MMHG | RESPIRATION RATE: 16 BRPM

## 2020-08-27 DIAGNOSIS — Z98.890 OTHER SPECIFIED POSTPROCEDURAL STATES: Chronic | ICD-10-CM

## 2020-08-27 DIAGNOSIS — K57.20 DIVERTICULITIS OF LARGE INTESTINE WITH PERFORATION AND ABSCESS WITHOUT BLEEDING: ICD-10-CM

## 2020-08-27 DIAGNOSIS — Z96.652 PRESENCE OF LEFT ARTIFICIAL KNEE JOINT: Chronic | ICD-10-CM

## 2020-08-27 DIAGNOSIS — Z98.49 CATARACT EXTRACTION STATUS, UNSPECIFIED EYE: Chronic | ICD-10-CM

## 2020-08-27 DIAGNOSIS — Z96.651 PRESENCE OF RIGHT ARTIFICIAL KNEE JOINT: Chronic | ICD-10-CM

## 2020-08-27 LAB
BLD GP AB SCN SERPL QL: POSITIVE — SIGNIFICANT CHANGE UP
GLUCOSE BLDC GLUCOMTR-MCNC: 93 MG/DL — SIGNIFICANT CHANGE UP (ref 70–99)
RH IG SCN BLD-IMP: POSITIVE — SIGNIFICANT CHANGE UP

## 2020-08-27 PROCEDURE — 88305 TISSUE EXAM BY PATHOLOGIST: CPT | Mod: 26

## 2020-08-27 PROCEDURE — 45300 PROCTOSIGMOIDOSCOPY DX: CPT

## 2020-08-27 PROCEDURE — 93010 ELECTROCARDIOGRAM REPORT: CPT

## 2020-08-27 PROCEDURE — 58925 REMOVAL OF OVARIAN CYST(S): CPT | Mod: RT

## 2020-08-27 PROCEDURE — 52005 CYSTO W/URTRL CATHJ: CPT

## 2020-08-27 PROCEDURE — 88307 TISSUE EXAM BY PATHOLOGIST: CPT | Mod: 26

## 2020-08-27 PROCEDURE — 44626 REPAIR BOWEL OPENING: CPT

## 2020-08-27 PROCEDURE — 88304 TISSUE EXAM BY PATHOLOGIST: CPT | Mod: 26

## 2020-08-27 PROCEDURE — 97605 NEG PRS WND THER DME<=50SQCM: CPT

## 2020-08-27 RX ORDER — HYDROMORPHONE HYDROCHLORIDE 2 MG/ML
0.3 INJECTION INTRAMUSCULAR; INTRAVENOUS; SUBCUTANEOUS
Refills: 0 | Status: DISCONTINUED | OUTPATIENT
Start: 2020-08-27 | End: 2020-08-27

## 2020-08-27 RX ORDER — OXYCODONE HYDROCHLORIDE 5 MG/1
5 TABLET ORAL EVERY 4 HOURS
Refills: 0 | Status: DISCONTINUED | OUTPATIENT
Start: 2020-08-27 | End: 2020-09-01

## 2020-08-27 RX ORDER — ONDANSETRON 8 MG/1
4 TABLET, FILM COATED ORAL EVERY 6 HOURS
Refills: 0 | Status: DISCONTINUED | OUTPATIENT
Start: 2020-08-27 | End: 2020-08-28

## 2020-08-27 RX ORDER — POLYETHYLENE GLYCOL 3350 17 G/17G
0 POWDER, FOR SOLUTION ORAL
Qty: 0 | Refills: 0 | DISCHARGE

## 2020-08-27 RX ORDER — SODIUM CHLORIDE 9 MG/ML
500 INJECTION, SOLUTION INTRAVENOUS ONCE
Refills: 0 | Status: COMPLETED | OUTPATIENT
Start: 2020-08-27 | End: 2020-08-28

## 2020-08-27 RX ORDER — SERTRALINE 25 MG/1
2 TABLET, FILM COATED ORAL
Qty: 0 | Refills: 0 | DISCHARGE

## 2020-08-27 RX ORDER — SERTRALINE 25 MG/1
200 TABLET, FILM COATED ORAL DAILY
Refills: 0 | Status: DISCONTINUED | OUTPATIENT
Start: 2020-08-27 | End: 2020-09-01

## 2020-08-27 RX ORDER — CALCIUM CARBONATE 500(1250)
1 TABLET ORAL
Qty: 0 | Refills: 0 | DISCHARGE

## 2020-08-27 RX ORDER — OMEPRAZOLE 10 MG/1
1 CAPSULE, DELAYED RELEASE ORAL
Qty: 0 | Refills: 0 | DISCHARGE

## 2020-08-27 RX ORDER — HYDROMORPHONE HYDROCHLORIDE 2 MG/ML
0.5 INJECTION INTRAMUSCULAR; INTRAVENOUS; SUBCUTANEOUS
Refills: 0 | Status: DISCONTINUED | OUTPATIENT
Start: 2020-08-27 | End: 2020-08-28

## 2020-08-27 RX ORDER — HYDROMORPHONE HYDROCHLORIDE 2 MG/ML
30 INJECTION INTRAMUSCULAR; INTRAVENOUS; SUBCUTANEOUS
Refills: 0 | Status: DISCONTINUED | OUTPATIENT
Start: 2020-08-27 | End: 2020-08-28

## 2020-08-27 RX ORDER — KETOROLAC TROMETHAMINE 30 MG/ML
15 SYRINGE (ML) INJECTION EVERY 6 HOURS
Refills: 0 | Status: DISCONTINUED | OUTPATIENT
Start: 2020-08-27 | End: 2020-08-27

## 2020-08-27 RX ORDER — LIDOCAINE HCL 20 MG/ML
0.2 VIAL (ML) INJECTION ONCE
Refills: 0 | Status: DISCONTINUED | OUTPATIENT
Start: 2020-08-27 | End: 2020-08-27

## 2020-08-27 RX ORDER — CALCIUM CARBONATE 500(1250)
1 TABLET ORAL DAILY
Refills: 0 | Status: DISCONTINUED | OUTPATIENT
Start: 2020-08-27 | End: 2020-09-01

## 2020-08-27 RX ORDER — PANTOPRAZOLE SODIUM 20 MG/1
40 TABLET, DELAYED RELEASE ORAL
Refills: 0 | Status: DISCONTINUED | OUTPATIENT
Start: 2020-08-27 | End: 2020-09-01

## 2020-08-27 RX ORDER — KETOROLAC TROMETHAMINE 30 MG/ML
15 SYRINGE (ML) INJECTION EVERY 8 HOURS
Refills: 0 | Status: DISCONTINUED | OUTPATIENT
Start: 2020-08-27 | End: 2020-08-27

## 2020-08-27 RX ORDER — CELECOXIB 200 MG/1
400 CAPSULE ORAL ONCE
Refills: 0 | Status: COMPLETED | OUTPATIENT
Start: 2020-08-27 | End: 2020-08-27

## 2020-08-27 RX ORDER — OXYCODONE HYDROCHLORIDE 5 MG/1
10 TABLET ORAL EVERY 4 HOURS
Refills: 0 | Status: DISCONTINUED | OUTPATIENT
Start: 2020-08-27 | End: 2020-09-01

## 2020-08-27 RX ORDER — ACETAMINOPHEN 500 MG
1000 TABLET ORAL EVERY 6 HOURS
Refills: 0 | Status: COMPLETED | OUTPATIENT
Start: 2020-08-27 | End: 2020-08-28

## 2020-08-27 RX ORDER — GABAPENTIN 400 MG/1
600 CAPSULE ORAL ONCE
Refills: 0 | Status: COMPLETED | OUTPATIENT
Start: 2020-08-27 | End: 2020-08-27

## 2020-08-27 RX ORDER — NALOXONE HYDROCHLORIDE 4 MG/.1ML
0.1 SPRAY NASAL
Refills: 0 | Status: DISCONTINUED | OUTPATIENT
Start: 2020-08-27 | End: 2020-08-28

## 2020-08-27 RX ORDER — SODIUM CHLORIDE 9 MG/ML
3 INJECTION INTRAMUSCULAR; INTRAVENOUS; SUBCUTANEOUS EVERY 8 HOURS
Refills: 0 | Status: DISCONTINUED | OUTPATIENT
Start: 2020-08-27 | End: 2020-08-27

## 2020-08-27 RX ORDER — SODIUM CHLORIDE 9 MG/ML
1000 INJECTION, SOLUTION INTRAVENOUS
Refills: 0 | Status: DISCONTINUED | OUTPATIENT
Start: 2020-08-27 | End: 2020-08-29

## 2020-08-27 RX ORDER — CEFOTETAN DISODIUM 1 G
2 VIAL (EA) INJECTION ONCE
Refills: 0 | Status: DISCONTINUED | OUTPATIENT
Start: 2020-08-27 | End: 2020-08-27

## 2020-08-27 RX ORDER — ENOXAPARIN SODIUM 100 MG/ML
30 INJECTION SUBCUTANEOUS EVERY 24 HOURS
Refills: 0 | Status: DISCONTINUED | OUTPATIENT
Start: 2020-08-27 | End: 2020-09-01

## 2020-08-27 RX ADMIN — HYDROMORPHONE HYDROCHLORIDE 30 MILLILITER(S): 2 INJECTION INTRAMUSCULAR; INTRAVENOUS; SUBCUTANEOUS at 16:08

## 2020-08-27 RX ADMIN — HYDROMORPHONE HYDROCHLORIDE 30 MILLILITER(S): 2 INJECTION INTRAMUSCULAR; INTRAVENOUS; SUBCUTANEOUS at 14:16

## 2020-08-27 RX ADMIN — GABAPENTIN 600 MILLIGRAM(S): 400 CAPSULE ORAL at 06:59

## 2020-08-27 RX ADMIN — Medication 15 MILLIGRAM(S): at 21:37

## 2020-08-27 RX ADMIN — SODIUM CHLORIDE 40 MILLILITER(S): 9 INJECTION, SOLUTION INTRAVENOUS at 12:35

## 2020-08-27 RX ADMIN — SODIUM CHLORIDE 40 MILLILITER(S): 9 INJECTION, SOLUTION INTRAVENOUS at 16:09

## 2020-08-27 RX ADMIN — Medication 400 MILLIGRAM(S): at 16:56

## 2020-08-27 RX ADMIN — ENOXAPARIN SODIUM 40 MILLIGRAM(S): 100 INJECTION SUBCUTANEOUS at 16:56

## 2020-08-27 RX ADMIN — Medication 15 MILLIGRAM(S): at 13:41

## 2020-08-27 RX ADMIN — Medication 1000 MILLIGRAM(S): at 17:28

## 2020-08-27 RX ADMIN — HYDROMORPHONE HYDROCHLORIDE 30 MILLILITER(S): 2 INJECTION INTRAMUSCULAR; INTRAVENOUS; SUBCUTANEOUS at 19:09

## 2020-08-27 RX ADMIN — HYDROMORPHONE HYDROCHLORIDE 30 MILLILITER(S): 2 INJECTION INTRAMUSCULAR; INTRAVENOUS; SUBCUTANEOUS at 15:05

## 2020-08-27 RX ADMIN — Medication 15 MILLIGRAM(S): at 21:19

## 2020-08-27 RX ADMIN — SODIUM CHLORIDE 3 MILLILITER(S): 9 INJECTION INTRAMUSCULAR; INTRAVENOUS; SUBCUTANEOUS at 06:56

## 2020-08-27 NOTE — BRIEF OPERATIVE NOTE - NSICDXBRIEFPREOP_GEN_ALL_CORE_FT
PRE-OP DIAGNOSIS:  Perforated diverticulum of large intestine 27-Aug-2020 12:15:10  Rosita Quesada  Colostomy in place 27-Aug-2020 12:14:51  Rosita Quesada

## 2020-08-27 NOTE — PHYSICAL THERAPY INITIAL EVALUATION ADULT - IMPAIRMENTS FOUND, PT EVAL
muscle strength/aerobic capacity/endurance/gait, locomotion, and balance aerobic capacity/endurance/gait, locomotion, and balance/muscle strength/integumentary integrity

## 2020-08-27 NOTE — BRIEF OPERATIVE NOTE - NSICDXBRIEFPOSTOP_GEN_ALL_CORE_FT
POST-OP DIAGNOSIS:  Colostomy in place 27-Aug-2020 12:15:30  Rosita Quesada  Perforated diverticulum of large intestine 27-Aug-2020 12:15:18  Rosita Quesada

## 2020-08-27 NOTE — BRIEF OPERATIVE NOTE - NSICDXBRIEFPROCEDURE_GEN_ALL_CORE_FT
PROCEDURES:  Open right ovarian cystectomy 27-Aug-2020 12:14:29  Rosita Quesada  Reversal of Luisana procedure 27-Aug-2020 12:14:17  Rosita Quesada

## 2020-08-27 NOTE — CHART NOTE - NSCHARTNOTEFT_GEN_A_CORE
Pt see and examined at bedside in the presence of her visiting daughter. S/p several hours from reversal of Salas.   Pt was found deeply sleeping, not c/o pain.   She denies passing flatus/BM, CP, SOB, fever, or chills.    T(C): 36.3 (08-27-20 @ 15:42), Max: 36.8 (08-27-20 @ 06:18)  HR: 86 (08-27-20 @ 16:54) (74 - 86)  BP: 96/68 (08-27-20 @ 16:54) (91/63 - 142/74)  RR: 20 (08-27-20 @ 16:38) (14 - 20)  SpO2: 96% (08-27-20 @ 16:54) (95% - 100%)    GENERAL: patient appears well, no acute distress, appropriate, pleasant  LUNGS: patent airways, nl breathing.   GASTROINTESTINAL: abdomen is soft, nontender, nondistended  Incision: Midline Abthera with wound Vac, no active bleeding or hematoma.   NEUROLOGIC: awake, alert, oriented x3.      MEDICATIONS  (STANDING):  acetaminophen  IVPB .. 1000 milliGRAM(s) IV Intermittent every 6 hours  calcium carbonate   1250 mG (OsCal) 1 Tablet(s) Oral daily  enoxaparin Injectable 40 milliGRAM(s) SubCutaneous every 24 hours  HYDROmorphone PCA (1 mG/mL) 30 milliLiter(s) PCA Continuous PCA Continuous  ketorolac   Injectable 15 milliGRAM(s) IV Push every 8 hours  lactated ringers. 1000 milliLiter(s) (40 mL/Hr) IV Continuous <Continuous>  pantoprazole    Tablet 40 milliGRAM(s) Oral before breakfast  sertraline 200 milliGRAM(s) Oral daily    MEDICATIONS  (PRN):  HYDROmorphone PCA (1 mG/mL) Rescue Clinician Bolus 0.5 milliGRAM(s) IV Push every 15 minutes PRN for Pain Scale GREATER THAN 6  naloxone Injectable 0.1 milliGRAM(s) IV Push every 3 minutes PRN For ANY of the following changes in patient status:  A. RR LESS THAN 10 breaths per minute, B. Oxygen saturation LESS THAN 90%, C. Sedation score of 6  ondansetron Injectable 4 milliGRAM(s) IV Push every 6 hours PRN Nausea  oxyCODONE    IR 5 milliGRAM(s) Oral every 4 hours PRN Moderate Pain (4 - 6)  oxyCODONE    IR 10 milliGRAM(s) Oral every 4 hours PRN Severe Pain (7 - 10)      Ass:  Mrs. Rowe is a 74 year, has had acute diverticulitis with large intestinal perforation and abscess without bleed, s/p Salas procedure in 1/29, returns for scheduled Cystoscopy insertion of ureteral catheters, ERAS, reversal of Salas. On Eras protocol recovering appropriately on the floor.     Plan:    - F/u GI function  - Diet: CLD  - Multimodal pain control: standing IV Tylenol, Toradol. Oxycodone 5 mg and 10 mg for moderate and severe pain respectively. PCA for breakthrough pain.  - Mable will be likely Dc'ed Tmr  - Encourage OOB to chair.   - IS  - dvt ppx: SQH  - F/u AM labs              R Ucfaraz already dc'ed, L Ucath will be dc'ed tomorrow.

## 2020-08-27 NOTE — PROVIDER CONTACT NOTE (OTHER) - SITUATION
pt received from PACU earlier this evening. BP has been running on the low side. latest BP 81/55 electronic. Manual 83/55.

## 2020-08-27 NOTE — PROVIDER CONTACT NOTE (OTHER) - ASSESSMENT
pt BP 81/55. all other vss stable. complaints of lightheadedness when attempting first oob. no other complaints. pt A&Ox4

## 2020-08-27 NOTE — PHYSICAL THERAPY INITIAL EVALUATION ADULT - MODALITIES TREATMENT COMMENTS
Midline Abd - 12.0cmx1.0cmx2.2cm (greatest depth distal wound), Stoma site (LLQ) 4.0cmx2.0cmx3.5cm All wounds clean/red/moist/no odor

## 2020-08-27 NOTE — PHYSICAL THERAPY INITIAL EVALUATION ADULT - PLANNED THERAPY INTERVENTIONS, PT EVAL
GOAL: Stair Negotiation Training: Patient will be able to negotiate up & down 1 flight of stairs with unilateral rail, step to gait pattern, in 2 weeks./balance training/bed mobility training/strengthening/transfer training/gait training

## 2020-08-27 NOTE — BRIEF OPERATIVE NOTE - COMMENTS
Spinal attempted but aborted due to spinal stenosis; ucaths placed by urology, R ucath removed at end of case. Extubated in OR.

## 2020-08-27 NOTE — PHYSICAL THERAPY INITIAL EVALUATION ADULT - PERTINENT HX OF CURRENT PROBLEM, REHAB EVAL
Mrs. Rowe is a 74 year old with PMH of Spinal stenosis, SPL, SOB with activity and OA s/p bilateral knee replacement most recent 1/27/2020 and 48 hours later had acute diverticulitis with large intestinal perforation and abscess without bleed s/p Salas procedure returns for scheduled Cystoscopy insertion of ureteral catheters, ERAS, reversal of Salas.

## 2020-08-27 NOTE — PRE-ANESTHESIA EVALUATION ADULT - NSANTHPMHFT_GEN_ALL_CORE
perforated diverticulitis Jan 2020, s/p open sigmoid colectomy, Salas/colostomy    pt denies angina, D.O.E. ; exercise abbey limited by O.A./spine disease  reports nuclear stress test approx. 3 years ago, neg per pt.

## 2020-08-27 NOTE — PRE-ANESTHESIA EVALUATION ADULT - NSANTHADDINFOFT_GEN_ALL_CORE
GETA, intrathecal opioids explained in detail; potentially difficult spinal placement given O.A., scoliosis/spinal stenosis. Risks/benefits including but not limited to HA, failure, nv. injury.  All questions answered.

## 2020-08-28 LAB
ANION GAP SERPL CALC-SCNC: 11 MMOL/L — SIGNIFICANT CHANGE UP (ref 5–17)
ANION GAP SERPL CALC-SCNC: 13 MMOL/L — SIGNIFICANT CHANGE UP (ref 5–17)
APTT BLD: 36.9 SEC — HIGH (ref 27.5–35.5)
BASE EXCESS BLDV CALC-SCNC: -4.7 MMOL/L — LOW (ref -2–2)
BUN SERPL-MCNC: 28 MG/DL — HIGH (ref 7–23)
BUN SERPL-MCNC: 29 MG/DL — HIGH (ref 7–23)
CA-I SERPL-SCNC: 1.18 MMOL/L — SIGNIFICANT CHANGE UP (ref 1.12–1.3)
CALCIUM SERPL-MCNC: 8.5 MG/DL — SIGNIFICANT CHANGE UP (ref 8.4–10.5)
CALCIUM SERPL-MCNC: 8.6 MG/DL — SIGNIFICANT CHANGE UP (ref 8.4–10.5)
CHLORIDE BLDV-SCNC: 107 MMOL/L — SIGNIFICANT CHANGE UP (ref 96–108)
CHLORIDE SERPL-SCNC: 103 MMOL/L — SIGNIFICANT CHANGE UP (ref 96–108)
CHLORIDE SERPL-SCNC: 104 MMOL/L — SIGNIFICANT CHANGE UP (ref 96–108)
CO2 BLDV-SCNC: 24 MMOL/L — SIGNIFICANT CHANGE UP (ref 22–30)
CO2 SERPL-SCNC: 21 MMOL/L — LOW (ref 22–31)
CO2 SERPL-SCNC: 22 MMOL/L — SIGNIFICANT CHANGE UP (ref 22–31)
CREAT SERPL-MCNC: 1.76 MG/DL — HIGH (ref 0.5–1.3)
CREAT SERPL-MCNC: 1.77 MG/DL — HIGH (ref 0.5–1.3)
GAS PNL BLDV: 133 MMOL/L — LOW (ref 135–145)
GAS PNL BLDV: SIGNIFICANT CHANGE UP
GAS PNL BLDV: SIGNIFICANT CHANGE UP
GLUCOSE BLDV-MCNC: 104 MG/DL — HIGH (ref 70–99)
GLUCOSE SERPL-MCNC: 105 MG/DL — HIGH (ref 70–99)
GLUCOSE SERPL-MCNC: 124 MG/DL — HIGH (ref 70–99)
HCO3 BLDV-SCNC: 23 MMOL/L — SIGNIFICANT CHANGE UP (ref 21–29)
HCT VFR BLD CALC: 27.7 % — LOW (ref 34.5–45)
HCT VFR BLD CALC: 29.9 % — LOW (ref 34.5–45)
HCT VFR BLD CALC: 34.7 % — SIGNIFICANT CHANGE UP (ref 34.5–45)
HCT VFR BLDA CALC: 30 % — LOW (ref 39–50)
HGB BLD CALC-MCNC: 9.8 G/DL — LOW (ref 11.5–15.5)
HGB BLD-MCNC: 11 G/DL — LOW (ref 11.5–15.5)
HGB BLD-MCNC: 8.7 G/DL — LOW (ref 11.5–15.5)
HGB BLD-MCNC: 9.8 G/DL — LOW (ref 11.5–15.5)
INR BLD: 1.11 RATIO — SIGNIFICANT CHANGE UP (ref 0.88–1.16)
LACTATE BLDV-MCNC: 3.5 MMOL/L — HIGH (ref 0.7–2)
MAGNESIUM SERPL-MCNC: 1.8 MG/DL — SIGNIFICANT CHANGE UP (ref 1.6–2.6)
MCHC RBC-ENTMCNC: 29.2 PG — SIGNIFICANT CHANGE UP (ref 27–34)
MCHC RBC-ENTMCNC: 29.3 PG — SIGNIFICANT CHANGE UP (ref 27–34)
MCHC RBC-ENTMCNC: 30.2 PG — SIGNIFICANT CHANGE UP (ref 27–34)
MCHC RBC-ENTMCNC: 31.4 GM/DL — LOW (ref 32–36)
MCHC RBC-ENTMCNC: 31.7 GM/DL — LOW (ref 32–36)
MCHC RBC-ENTMCNC: 32.8 GM/DL — SIGNIFICANT CHANGE UP (ref 32–36)
MCV RBC AUTO: 92.3 FL — SIGNIFICANT CHANGE UP (ref 80–100)
MCV RBC AUTO: 92.3 FL — SIGNIFICANT CHANGE UP (ref 80–100)
MCV RBC AUTO: 93 FL — SIGNIFICANT CHANGE UP (ref 80–100)
NRBC # BLD: 0 /100 WBCS — SIGNIFICANT CHANGE UP (ref 0–0)
OTHER CELLS CSF MANUAL: 3 ML/DL — LOW (ref 16–22)
PCO2 BLDV: 56 MMHG — HIGH (ref 35–50)
PH BLDV: 7.23 — LOW (ref 7.35–7.45)
PHOSPHATE SERPL-MCNC: 5.1 MG/DL — HIGH (ref 2.5–4.5)
PLATELET # BLD AUTO: 144 K/UL — LOW (ref 150–400)
PLATELET # BLD AUTO: 165 K/UL — SIGNIFICANT CHANGE UP (ref 150–400)
PLATELET # BLD AUTO: 191 K/UL — SIGNIFICANT CHANGE UP (ref 150–400)
PO2 BLDV: <20 MMHG — LOW (ref 25–45)
POTASSIUM BLDV-SCNC: 4.3 MMOL/L — SIGNIFICANT CHANGE UP (ref 3.5–5.3)
POTASSIUM SERPL-MCNC: 4.3 MMOL/L — SIGNIFICANT CHANGE UP (ref 3.5–5.3)
POTASSIUM SERPL-MCNC: 4.9 MMOL/L — SIGNIFICANT CHANGE UP (ref 3.5–5.3)
POTASSIUM SERPL-SCNC: 4.3 MMOL/L — SIGNIFICANT CHANGE UP (ref 3.5–5.3)
POTASSIUM SERPL-SCNC: 4.9 MMOL/L — SIGNIFICANT CHANGE UP (ref 3.5–5.3)
PROTHROM AB SERPL-ACNC: 13.1 SEC — SIGNIFICANT CHANGE UP (ref 10.6–13.6)
RBC # BLD: 2.98 M/UL — LOW (ref 3.8–5.2)
RBC # BLD: 3.24 M/UL — LOW (ref 3.8–5.2)
RBC # BLD: 3.76 M/UL — LOW (ref 3.8–5.2)
RBC # FLD: 15.3 % — HIGH (ref 10.3–14.5)
RBC # FLD: 15.3 % — HIGH (ref 10.3–14.5)
RBC # FLD: 15.4 % — HIGH (ref 10.3–14.5)
SAO2 % BLDV: 22 % — LOW (ref 67–88)
SODIUM SERPL-SCNC: 137 MMOL/L — SIGNIFICANT CHANGE UP (ref 135–145)
SODIUM SERPL-SCNC: 137 MMOL/L — SIGNIFICANT CHANGE UP (ref 135–145)
TROPONIN T, HIGH SENSITIVITY RESULT: 26 NG/L — SIGNIFICANT CHANGE UP (ref 0–51)
WBC # BLD: 12.73 K/UL — HIGH (ref 3.8–10.5)
WBC # BLD: 7.03 K/UL — SIGNIFICANT CHANGE UP (ref 3.8–10.5)
WBC # BLD: 8.46 K/UL — SIGNIFICANT CHANGE UP (ref 3.8–10.5)
WBC # FLD AUTO: 12.73 K/UL — HIGH (ref 3.8–10.5)
WBC # FLD AUTO: 7.03 K/UL — SIGNIFICANT CHANGE UP (ref 3.8–10.5)
WBC # FLD AUTO: 8.46 K/UL — SIGNIFICANT CHANGE UP (ref 3.8–10.5)

## 2020-08-28 PROCEDURE — 99222 1ST HOSP IP/OBS MODERATE 55: CPT

## 2020-08-28 RX ORDER — ACETAMINOPHEN 500 MG
1000 TABLET ORAL ONCE
Refills: 0 | Status: COMPLETED | OUTPATIENT
Start: 2020-08-28 | End: 2020-08-28

## 2020-08-28 RX ORDER — SODIUM CHLORIDE 9 MG/ML
500 INJECTION, SOLUTION INTRAVENOUS ONCE
Refills: 0 | Status: COMPLETED | OUTPATIENT
Start: 2020-08-28 | End: 2020-08-28

## 2020-08-28 RX ORDER — SODIUM CHLORIDE 9 MG/ML
750 INJECTION, SOLUTION INTRAVENOUS ONCE
Refills: 0 | Status: COMPLETED | OUTPATIENT
Start: 2020-08-28 | End: 2020-08-28

## 2020-08-28 RX ORDER — ACETAMINOPHEN 500 MG
1000 TABLET ORAL ONCE
Refills: 0 | Status: COMPLETED | OUTPATIENT
Start: 2020-08-29 | End: 2020-08-29

## 2020-08-28 RX ORDER — SODIUM CHLORIDE 9 MG/ML
500 INJECTION INTRAMUSCULAR; INTRAVENOUS; SUBCUTANEOUS ONCE
Refills: 0 | Status: DISCONTINUED | OUTPATIENT
Start: 2020-08-28 | End: 2020-08-28

## 2020-08-28 RX ORDER — LOPERAMIDE HCL 2 MG
2 TABLET ORAL EVERY 4 HOURS
Refills: 0 | Status: DISCONTINUED | OUTPATIENT
Start: 2020-08-28 | End: 2020-08-28

## 2020-08-28 RX ADMIN — SERTRALINE 100 MILLIGRAM(S): 25 TABLET, FILM COATED ORAL at 11:58

## 2020-08-28 RX ADMIN — Medication 1000 MILLIGRAM(S): at 15:00

## 2020-08-28 RX ADMIN — SODIUM CHLORIDE 1000 MILLILITER(S): 9 INJECTION, SOLUTION INTRAVENOUS at 05:10

## 2020-08-28 RX ADMIN — SODIUM CHLORIDE 750 MILLILITER(S): 9 INJECTION, SOLUTION INTRAVENOUS at 08:35

## 2020-08-28 RX ADMIN — HYDROMORPHONE HYDROCHLORIDE 30 MILLILITER(S): 2 INJECTION INTRAMUSCULAR; INTRAVENOUS; SUBCUTANEOUS at 07:34

## 2020-08-28 RX ADMIN — Medication 1000 MILLIGRAM(S): at 12:30

## 2020-08-28 RX ADMIN — PANTOPRAZOLE SODIUM 40 MILLIGRAM(S): 20 TABLET, DELAYED RELEASE ORAL at 05:21

## 2020-08-28 RX ADMIN — Medication 400 MILLIGRAM(S): at 11:54

## 2020-08-28 RX ADMIN — Medication 400 MILLIGRAM(S): at 14:30

## 2020-08-28 RX ADMIN — Medication 400 MILLIGRAM(S): at 20:27

## 2020-08-28 RX ADMIN — Medication 1000 MILLIGRAM(S): at 20:43

## 2020-08-28 RX ADMIN — Medication 1000 MILLIGRAM(S): at 05:35

## 2020-08-28 RX ADMIN — ENOXAPARIN SODIUM 30 MILLIGRAM(S): 100 INJECTION SUBCUTANEOUS at 16:36

## 2020-08-28 RX ADMIN — Medication 400 MILLIGRAM(S): at 05:20

## 2020-08-28 RX ADMIN — SODIUM CHLORIDE 1000 MILLILITER(S): 9 INJECTION, SOLUTION INTRAVENOUS at 00:18

## 2020-08-28 RX ADMIN — Medication 1 TABLET(S): at 11:55

## 2020-08-28 NOTE — PHARMACOTHERAPY INTERVENTION NOTE - COMMENTS
Recommended reducing patient's Lovenox for DVT ppx to 30 mg q24h, based on current renal function (CrCl 25 ml/min).    Maia Cruz, PharmD  PGY-1 Pharmacy Resident  [id number]

## 2020-08-28 NOTE — PROGRESS NOTE ADULT - ASSESSMENT
Summary: Pt is a 74 year old woman with hx of perforated diverticulitis s/p Luisana procedure Jan 30 of this year. She is now POD 1 s/p Luisana reversal with R and L ucath placement and R ovarian cystectomy. She is on ERAS protocol but did not receive duramorph due to spinal stenosis. Pain is controlled with PCA.     Assessment: Pt is feeling well without symptoms of hypovolemia but is hypotensive with decreased urine output and rising creatinine. Her blood pressure is responsive to fluid bolus.     Plan:   - Continue to resuscitate with bolus IVF  - Monitor urine output and vitals  - f/u AM labs (H/H, electrolytes)  - once making adequate urine, kearney can be removed  - continue clears diet  - Pain control with PCA  - IS  - DVT ppx HSQ    Red Team Surgery  pager #[phone] Summary: Pt is a 74 year old woman with hx of perforated diverticulitis s/p Luisana procedure Jan 30 of this year. She is now POD 1 s/p Luisana reversal with R and L ucath placement and R ovarian cystectomy. She is on ERAS protocol but did not receive duramorph due to spinal stenosis. Pain is controlled with PCA.     Assessment: Pt is feeling well without symptoms of hypovolemia but is hypotensive with decreased urine output and rising creatinine. Her blood pressure is responsive to fluid bolus.     Plan:   - Continue to resuscitate with bolus IVF  - Monitor urine output and vitals  - f/u AM labs (H/H, electrolytes)  - once making adequate urine, kearney can be removed  - continue clears diet, advance to LRD once passing gas  - Pain control with PCA  - IS  - DVT ppx HSQ    Red Team Surgery  pager #[phone]

## 2020-08-28 NOTE — DIETITIAN INITIAL EVALUATION ADULT. - PHYSICAL APPEARANCE
other (specify)/well nourished Ht: 63 inches , Wt: 130lbs, BMI: 23kg/m2, IBW: 115lbs +/- 10%, %IBW: 107%  Edema: none, Skin: free of pressure injuries per nursing flow sheets

## 2020-08-28 NOTE — CONSULT NOTE ADULT - SUBJECTIVE AND OBJECTIVE BOX
HISTORY OF PRESENT ILLNESS:  NORMA LEAL is a     74F with no PMHx and PSHx of R/L TKR (2005/2020) ago is s/p exploratory laparotomy, Salas procedure and left oophorectomy (1/30/2020) for perforated diverticulitis. She presented yesterday for elective Salas reversal and ovarian cystectomy. The spinal duramorph was abandoned after the patient's spinal stenosis did not allow for puncture. The patient also had ucaths placed and removed, and she was extubated.  EBL was 75mL and UOP was 150cc.     Post operatively the patient's systolic BP downtrended from 110 to 77/50 by 11pm. At this point the patient continued to fall asleep during the exam but remained A&O x3 and asymptomatic. She was given a 500cc bolus which did not improve UOP (0.3cc/kg/hr) and labs were sent that were significant for HARJIT (Cr 1.77 from 1.06). H&H was 11/34.7 from 12.1/37.8. BP since responded to the bolus and she remains approximately 90mmHg.     On examination by the SICU team she remains awake, alert, and oriented. She is completely asymptomatic and BP is 90/54. She is saturating 100% on 2L NC. She denies dizziness, chest pain, nausea, vomiting, constipation, diarrhea, or incisional pain.    PAST MEDICAL HISTORY: OA (osteoarthritis)  Acute diverticulitis  No pertinent past medical history  Hard of hearing  SPL (spondylolisthesis)  H/O scoliosis  Spinal stenosis  Anxiety  Osteopenia      PAST SURGICAL HISTORY:   S/p Salas Procedure  S/P total knee replacement, left  S/P total knee arthroplasty, right  S/P cataract extraction and insertion of intraocular lens  S/P hammer toe correction  S/P shoulder surgery  H/O carpal tunnel repair  H/O total knee replacement, right        HOME MEDICATIONS:  Seroquel    ALLERGIES: No Known Allergies      VITAL SIGNS:  ICU Vital Signs Last 24 Hrs  T(C): 36.7 (28 Aug 2020 04:34), Max: 36.8 (27 Aug 2020 06:18)  T(F): 98 (28 Aug 2020 04:34), Max: 98.2 (27 Aug 2020 06:18)  HR: 83 (28 Aug 2020 04:34) (74 - 87)  BP: 90/54 (28 Aug 2020 04:34) (77/50 - 142/74)  BP(mean): 80 (27 Aug 2020 15:00) (80 - 101)  ABP: --  ABP(mean): --  RR: 20 (28 Aug 2020 04:34) (14 - 20)  SpO2: 99% (28 Aug 2020 04:34) (95% - 100%)      NEURO  Exam:  Meds:acetaminophen  IVPB .. 1000 milliGRAM(s) IV Intermittent every 6 hours  HYDROmorphone PCA (1 mG/mL) 30 milliLiter(s) PCA Continuous PCA Continuous  HYDROmorphone PCA (1 mG/mL) Rescue Clinician Bolus 0.5 milliGRAM(s) IV Push every 15 minutes PRN for Pain Scale GREATER THAN 6  ondansetron Injectable 4 milliGRAM(s) IV Push every 6 hours PRN Nausea  oxyCODONE    IR 5 milliGRAM(s) Oral every 4 hours PRN Moderate Pain (4 - 6)  oxyCODONE    IR 10 milliGRAM(s) Oral every 4 hours PRN Severe Pain (7 - 10)  sertraline 200 milliGRAM(s) Oral daily      RESPIRATORY  Breathing well and Saturating 100% on 2L NC    CARDIOVASCULAR  Hypotension noted above to 77/50, currently 93/54  Exam: RRR  Meds: none    GI/NUTRITION  Exam: abdominal soft, nontender, nondistended, no rebound tenderness, VAC in place in the midline, holding suction  Diet: CLD, tolerating well  Meds:  pantoprazole    Tablet 40 milliGRAM(s) Oral before breakfast      GENITOURINARY/RENAL  Meds:calcium carbonate   1250 mG (OsCal) 1 Tablet(s) Oral daily  lactated ringers Bolus 500 milliLiter(s) IV Bolus once  lactated ringers. 1000 milliLiter(s) IV Continuous <Continuous>      08-27 @ 07:01  -  08-28 @ 05:04  --------------------------------------------------------  IN:    lactated ringers.: 200 mL    Oral Fluid: 200 mL  Total IN: 400 mL    OUT:    Indwelling Catheter - Urethral: 625 mL    VAC (Vacuum Assisted Closure) System: 20 mL  Total OUT: 645 mL    Total NET: -245 mL        Weight (kg): 59 (08-27 @ 08:22)  08-28    137  |  103  |  28<H>  ----------------------------<  124<H>  4.3   |  21<L>  |  1.77<H>    Ca    8.5      28 Aug 2020 01:43      [ ] Akins catheter, indication: urine output monitoring in critically ill patient    HEMATOLOGIC  [x ] VTE Prophylaxis:  enoxaparin Injectable 40 milliGRAM(s) SubCutaneous every 24 hours                          11.0   12.73 )-----------( 191      ( 28 Aug 2020 00:05 )             34.7       Transfusion: [ ] PRBC	[ ] Platelets	[ ] FFP	[ ] Cryoprecipitate      INFECTIOUS DISEASES  Meds: none  RECENT CULTURES: none      ENDOCRINE  Meds: none  CAPILLARY BLOOD GLUCOSE      POCT Blood Glucose.: 93 mg/dL (27 Aug 2020 06:08)      PATIENT CARE ACCESS DEVICES:  [ x ] Peripheral IV  [ ] Central Venous Line	[ ] R	[ ] L	[ ] IJ	[ ] Fem	[ ] SC	Placed:   [ ] Arterial Line		[ ] R	[ ] L	[ ] Fem	[ ] Rad	[ ] Ax	Placed:   [ ] PICC:					[ ] Mediport  [ ] Urinary Catheter, Date Placed:   [x] Necessity of urinary, arterial, and venous catheters discussed    OTHER MEDICATIONS: naloxone Injectable 0.1 milliGRAM(s) IV Push every 3 minutes PRN      IMAGING STUDIES:

## 2020-08-28 NOTE — PROGRESS NOTE ADULT - SUBJECTIVE AND OBJECTIVE BOX
POD #1    24hr events:  - R and L ucath removed  - Patient started on PCA (no Duramorph preop due to spinal stenosis)  - Clears diet    Overnight events:   - Overnight, pt was hypotensive with SBP in the 70s but asymptomatic. Labs were drawn showing Cr of 1.77 up from 1.06 preop. Pt was given [id number] bolus LR. AM blood pressure merrill to 90/54    SUBJECTIVE:  Pt is feeling well. Her pain abdominal pain is well controlled. She denies fever, chills, sweats, dizziness, or palpitations and is tolerating clears. -BM -gas      OBJECTIVE:  Vital Signs Last 24 Hrs  T(C): 36.7 (28 Aug 2020 04:34), Max: 36.8 (27 Aug 2020 08:22)  T(F): 98 (28 Aug 2020 04:34), Max: 98 (28 Aug 2020 04:34)  HR: 83 (28 Aug 2020 04:34) (74 - 87)  BP: 90/54 (28 Aug 2020 04:34) (77/50 - 142/74)  BP(mean): 80 (27 Aug 2020 15:00) (80 - 101)  RR: 20 (28 Aug 2020 04:34) (14 - 20)  SpO2: 99% (28 Aug 2020 04:34) (95% - 100%)    Physical Examination:  GEN: NAD, resting quietly  PULM: symmetric chest rise bilaterally, no increased WOB  ABD: soft, tender to palpation RUQ and RLQ, nondistended, incision CDI with wound vac in place  EXTR: no LE erythema, moving all extremities    Wound vac output 20cc  Akins output 625cc with 225 overnight      LABS:                        9.8    8.46  )-----------( 165      ( 28 Aug 2020 07:07 )             29.9       08-28    137  |  103  |  28<H>  ----------------------------<  124<H>  4.3   |  21<L>  |  1.77<H>    Ca    8.5      28 Aug 2020 01:43 POD #1    24hr events:  - R and L ucath removed  - Patient started on PCA (no Duramorph preop due to spinal stenosis)  - Clears diet    Overnight events:   - Overnight, pt was hypotensive with SBP in the 70s but asymptomatic. Labs were drawn showing Cr of 1.77 up from 1.06 preop. Pt was given [id number] bolus LR. AM blood pressure merrill to 90/54    SUBJECTIVE:  Pt is feeling well. Her pain abdominal pain is well controlled. She denies fever, chills, sweats, dizziness, or palpitations and is tolerating clears. -BM -gas      OBJECTIVE:  Vital Signs Last 24 Hrs  T(C): 36.7 (28 Aug 2020 04:34), Max: 36.8 (27 Aug 2020 08:22)  T(F): 98 (28 Aug 2020 04:34), Max: 98 (28 Aug 2020 04:34)  HR: 83 (28 Aug 2020 04:34) (74 - 87)  BP: 90/54 (28 Aug 2020 04:34) (77/50 - 142/74)  BP(mean): 80 (27 Aug 2020 15:00) (80 - 101)  RR: 20 (28 Aug 2020 04:34) (14 - 20)  SpO2: 99% (28 Aug 2020 04:34) (95% - 100%)    Physical Examination:  GEN: NAD, resting quietly  PULM: symmetric chest rise bilaterally, no increased WOB  ABD: soft, tender to palpation RUQ and RLQ, nondistended, incision CDI with wound vac in place  EXTR: no LE erythema, moving all extremities    Wound vac output 20cc serosanguinous  Akins output 625cc with 225 overnight      LABS:                        9.8    8.46  )-----------( 165      ( 28 Aug 2020 07:07 )             29.9       08-28    137  |  103  |  28<H>  ----------------------------<  124<H>  4.3   |  21<L>  |  1.77<H>    Ca    8.5      28 Aug 2020 01:43

## 2020-08-28 NOTE — PROGRESS NOTE ADULT - SUBJECTIVE AND OBJECTIVE BOX
Day __ of Anesthesia Pain Management Service    SUBJECTIVE: Patient is doing well with IV PCA    Pain Scale Score:	[X] Refer to charted pain scores    THERAPY:    [ ] IV PCA Morphine		[ ] 5 mg/mL	[ ] 1 mg/mL  [X] IV PCA Hydromorphone	[ ] 5 mg/mL	[X] 1 mg/mL  [ ] IV PCA Fentanyl		[ ] 50 micrograms/mL    Demand dose: 0.2 mg     Lockout: 6 minutes   Continuous Rate: 0 mg/hr  4 Hour Limit: 4 mg    MEDICATIONS  (STANDING):  acetaminophen  IVPB .. 1000 milliGRAM(s) IV Intermittent every 6 hours  calcium carbonate   1250 mG (OsCal) 1 Tablet(s) Oral daily  enoxaparin Injectable 40 milliGRAM(s) SubCutaneous every 24 hours  HYDROmorphone PCA (1 mG/mL) 30 milliLiter(s) PCA Continuous PCA Continuous  lactated ringers. 1000 milliLiter(s) (100 mL/Hr) IV Continuous <Continuous>  pantoprazole    Tablet 40 milliGRAM(s) Oral before breakfast  sertraline 200 milliGRAM(s) Oral daily    MEDICATIONS  (PRN):  HYDROmorphone PCA (1 mG/mL) Rescue Clinician Bolus 0.5 milliGRAM(s) IV Push every 15 minutes PRN for Pain Scale GREATER THAN 6  naloxone Injectable 0.1 milliGRAM(s) IV Push every 3 minutes PRN For ANY of the following changes in patient status:  A. RR LESS THAN 10 breaths per minute, B. Oxygen saturation LESS THAN 90%, C. Sedation score of 6  ondansetron Injectable 4 milliGRAM(s) IV Push every 6 hours PRN Nausea  oxyCODONE    IR 5 milliGRAM(s) Oral every 4 hours PRN Moderate Pain (4 - 6)  oxyCODONE    IR 10 milliGRAM(s) Oral every 4 hours PRN Severe Pain (7 - 10)      OBJECTIVE:    Sedation Score:	[ X] Alert	[ ] Drowsy 	[ ] Arousable	[ ] Asleep	[ ] Unresponsive    Side Effects:	[X ] None	[ ] Nausea	[ ] Vomiting	[ ] Pruritus  		[ ] Other:    Vital Signs Last 24 Hrs  T(C): 36.7 (28 Aug 2020 04:34), Max: 36.7 (28 Aug 2020 04:34)  T(F): 98 (28 Aug 2020 04:34), Max: 98 (28 Aug 2020 04:34)  HR: 83 (28 Aug 2020 04:34) (78 - 87)  BP: 90/54 (28 Aug 2020 04:34) (77/50 - 142/74)  BP(mean): 80 (27 Aug 2020 15:00) (80 - 101)  RR: 20 (28 Aug 2020 04:34) (14 - 20)  SpO2: 99% (28 Aug 2020 04:34) (95% - 100%)    ASSESSMENT/ PLAN    Therapy to  be:               [X] Continued   [ ] Discontinued   [ ] Changed to PRN Analgesics    Documentation and Verification of current medications:   [X] Done	[ ] Not done, not eligible    Comments:

## 2020-08-28 NOTE — DIETITIAN INITIAL EVALUATION ADULT. - ADD RECOMMEND
1) Continue current diet as tolerated. 2) Monitor adequacy of PO intake and need for nutrition supplement 3) Diet education provided, reinforce as needed. 4) RD to remain available and follow-up as medically appropriate.

## 2020-08-28 NOTE — CONSULT NOTE ADULT - ATTENDING COMMENTS
Pt evaluated on floor  74F s/p Salas procedure and left oophorectomy (1/30/2020) for perforated diverticulitis now POD 1 s/p ERAS elective Salas reversal and ovarian cystectomy. Post operative course complicated by hypotension, partially responsive to fluid bolus.  Developed HARJIT secondary to intravascular depletion.  Plan: IVF bolus followed by maintenance IVF. I/O check lactate  HCT stable, not bleeding  Pt awake alert comfortable  Transfer to ICU if remains hypotensive

## 2020-08-28 NOTE — CONSULT NOTE ADULT - ASSESSMENT
ASSESSMENT: 74F with PSHx of R/L TKR s/p Salas procedure and left oophorectomy (1/30/2020) for perforated diverticulitisnow POD 1 s/p ERAS elective Salas reversal and ovarian cystectomy. POst operative course complicated by hypotension, partially responsive to fluid bolus.    PLAN:   Neurologic:  A&O x 3, mentating well  Pain control with PCA pump, IV tylenol; well controlled  Holding home seroquel    Respiratory:   Saturating well on 2L NC, 100%    Cardiovascular:  Hypotension to 77/50, currently improved s/p 500cc bolus  1x 500cc bolus and continue to monitor MAP >65    Gastrointestinal/Nutrition:  s/p Salas reversal 8/28  Tolerating CLD, (-,-)    Genitourinary/Renal:  HARJIT: Cr 1.77 from 1.06, will continue to trend s/p bolus  1x 500cc bolus 8/27, another 8/28 AM  Increase maintenance fluids to 100cc/hr    Hematologic:  H&H stable 11.0 from 12.1  continue with DVT ppx lovenox 40    Infectious Disease:  WBC 12.73, afebrile, likely 2/2 OR    Endocrine:  No issues    Disposition:  SICU ASSESSMENT: 74F with PSHx of R/L TKR s/p Salas procedure and left oophorectomy (1/30/2020) for perforated diverticulitisnow POD 1 s/p ERAS elective Salas reversal and ovarian cystectomy. POst operative course complicated by hypotension, partially responsive to fluid bolus.    PLAN:   Neurologic:  A&O x 3, mentating well  Pain control with PCA pump, IV tylenol; well controlled  Holding home seroquel    Respiratory:   Saturating well on 2L NC, 100%    Cardiovascular:  Hypotension to 77/50, currently improved s/p 500cc bolus  1x 500cc bolus and continue to monitor MAP >65  Will fluid challenge on floor    Gastrointestinal/Nutrition:  s/p Salas reversal 8/28  Tolerating CLD, (-,-)    Genitourinary/Renal:  HARJIT: Cr 1.77 from 1.06, will continue to trend s/p bolus  1x 500cc bolus 8/27, another 8/28 AM  Increase maintenance fluids to 100cc/hr    Hematologic:  H&H stable 11.0 from 12.1  continue with DVT ppx lovenox 40    Infectious Disease:  WBC 12.73, afebrile, likely 2/2 OR    Endocrine:  No issues    Disposition:  Floor

## 2020-08-28 NOTE — DIETITIAN INITIAL EVALUATION ADULT. - OTHER INFO
Pertinent Information: This is a 74 year old woman with hx of perforated diverticulitis s/p Luisana procedure Jan 2020, pt now POD 1 s/p Luisana reversal with R and L ucath placement and R ovarian cystectomy.    Pt reports good PO intake and appetite PTA, consumes regular diet with no restrictions. Confirms NKFA. Pt denies chewing/swallowing difficulty, nausea, vomiting, normal ostomy output. Takes Miralax at home, supplements include biotin, calcium carbonate.      Weights; pt reports weight has been stable, denies any recent changes in weight. Pt reluctant to provide wt (family at bedside), reports desire to lose some weight. Weight per PST H&P noted as 130lbs (8/20) which is consistent with weight earlier this year per [facility] HIE: 132lbs (1/13/20), 130lbs (1/30/20). Will continue to monitor.     Pt reports tolerance to clear liquid diet though not taking in much due to lack of appetite, sipping on apple juice. No acute GI distress noted. Last BM 8/28.  Encouraged intake of Ensure Clear supplement for protein. Diet now advanced to low fiber diet for dinner. Pt amendable to diet education. Provided low-fiber nutrition therapy including importance of avoiding  fiber rich foods, fresh fruits/vegetables, whole grains, and added fiber in processed foods. Discussed chewing foods well and adequate hydration and protein.  Pt verbalized understanding and accepted written handout. Patient with no nutrition-related questions at this time. Made aware RD remains available as needed.

## 2020-08-29 LAB
ANION GAP SERPL CALC-SCNC: 11 MMOL/L — SIGNIFICANT CHANGE UP (ref 5–17)
BUN SERPL-MCNC: 24 MG/DL — HIGH (ref 7–23)
CALCIUM SERPL-MCNC: 8.6 MG/DL — SIGNIFICANT CHANGE UP (ref 8.4–10.5)
CHLORIDE SERPL-SCNC: 108 MMOL/L — SIGNIFICANT CHANGE UP (ref 96–108)
CO2 SERPL-SCNC: 22 MMOL/L — SIGNIFICANT CHANGE UP (ref 22–31)
CREAT SERPL-MCNC: 1.44 MG/DL — HIGH (ref 0.5–1.3)
GLUCOSE SERPL-MCNC: 84 MG/DL — SIGNIFICANT CHANGE UP (ref 70–99)
HCT VFR BLD CALC: 28.6 % — LOW (ref 34.5–45)
HGB BLD-MCNC: 9.2 G/DL — LOW (ref 11.5–15.5)
MAGNESIUM SERPL-MCNC: 1.9 MG/DL — SIGNIFICANT CHANGE UP (ref 1.6–2.6)
MCHC RBC-ENTMCNC: 29.9 PG — SIGNIFICANT CHANGE UP (ref 27–34)
MCHC RBC-ENTMCNC: 32.2 GM/DL — SIGNIFICANT CHANGE UP (ref 32–36)
MCV RBC AUTO: 92.9 FL — SIGNIFICANT CHANGE UP (ref 80–100)
NRBC # BLD: 0 /100 WBCS — SIGNIFICANT CHANGE UP (ref 0–0)
PHOSPHATE SERPL-MCNC: 3.1 MG/DL — SIGNIFICANT CHANGE UP (ref 2.5–4.5)
PLATELET # BLD AUTO: 157 K/UL — SIGNIFICANT CHANGE UP (ref 150–400)
POTASSIUM SERPL-MCNC: 3.7 MMOL/L — SIGNIFICANT CHANGE UP (ref 3.5–5.3)
POTASSIUM SERPL-SCNC: 3.7 MMOL/L — SIGNIFICANT CHANGE UP (ref 3.5–5.3)
RBC # BLD: 3.08 M/UL — LOW (ref 3.8–5.2)
RBC # FLD: 15.9 % — HIGH (ref 10.3–14.5)
SODIUM SERPL-SCNC: 141 MMOL/L — SIGNIFICANT CHANGE UP (ref 135–145)
WBC # BLD: 6.49 K/UL — SIGNIFICANT CHANGE UP (ref 3.8–10.5)
WBC # FLD AUTO: 6.49 K/UL — SIGNIFICANT CHANGE UP (ref 3.8–10.5)

## 2020-08-29 RX ORDER — KETOROLAC TROMETHAMINE 30 MG/ML
15 SYRINGE (ML) INJECTION EVERY 8 HOURS
Refills: 0 | Status: DISCONTINUED | OUTPATIENT
Start: 2020-08-29 | End: 2020-08-29

## 2020-08-29 RX ORDER — POTASSIUM CHLORIDE 20 MEQ
40 PACKET (EA) ORAL ONCE
Refills: 0 | Status: COMPLETED | OUTPATIENT
Start: 2020-08-29 | End: 2020-08-29

## 2020-08-29 RX ORDER — ACETAMINOPHEN 500 MG
975 TABLET ORAL EVERY 6 HOURS
Refills: 0 | Status: COMPLETED | OUTPATIENT
Start: 2020-08-29 | End: 2020-08-31

## 2020-08-29 RX ADMIN — Medication 40 MILLIEQUIVALENT(S): at 12:27

## 2020-08-29 RX ADMIN — Medication 1 TABLET(S): at 16:11

## 2020-08-29 RX ADMIN — PANTOPRAZOLE SODIUM 40 MILLIGRAM(S): 20 TABLET, DELAYED RELEASE ORAL at 05:11

## 2020-08-29 RX ADMIN — SERTRALINE 200 MILLIGRAM(S): 25 TABLET, FILM COATED ORAL at 11:31

## 2020-08-29 RX ADMIN — OXYCODONE HYDROCHLORIDE 5 MILLIGRAM(S): 5 TABLET ORAL at 22:38

## 2020-08-29 RX ADMIN — Medication 15 MILLIGRAM(S): at 06:16

## 2020-08-29 RX ADMIN — Medication 975 MILLIGRAM(S): at 11:31

## 2020-08-29 RX ADMIN — Medication 975 MILLIGRAM(S): at 17:34

## 2020-08-29 RX ADMIN — ENOXAPARIN SODIUM 30 MILLIGRAM(S): 100 INJECTION SUBCUTANEOUS at 16:11

## 2020-08-29 RX ADMIN — Medication 975 MILLIGRAM(S): at 18:52

## 2020-08-29 RX ADMIN — Medication 975 MILLIGRAM(S): at 12:27

## 2020-08-29 RX ADMIN — Medication 400 MILLIGRAM(S): at 02:27

## 2020-08-29 RX ADMIN — Medication 1000 MILLIGRAM(S): at 02:43

## 2020-08-29 RX ADMIN — Medication 15 MILLIGRAM(S): at 06:31

## 2020-08-29 NOTE — PROGRESS NOTE ADULT - ASSESSMENT
75 yo F POD#2 s/p open reversal of Luisana with previous history of perforated diverticulitis, hemodynamically appropriate, on a low fiber diet.    - continue LRD as tolerated  - oral analgesia, avoid NSAIDs in setting of HARJIT  - will remove kearney now, due to void in 8 hours  - anticipate home with vac, will require change per PT team  - out of bed today now that BP is better, ambulate with assistance    --LIBERTY Quesada, [phone]

## 2020-08-29 NOTE — PROGRESS NOTE ADULT - SUBJECTIVE AND OBJECTIVE BOX
Interval Events: Patient being monitored for borderline hypotension, without tachycardia, and with good urine output. On a low residue diet.    S: Patient doing well, denies fevers, chills, nausea, emesis, chest pain, SOB. Reports some soreness at her incision, but improved with IV acetaminophen. Passing some flatus, two non bloody bowel movements.    O: Vital Signs  T(C): 36.7 (08-29 @ 05:30), Max: 36.9 (08-28 @ 09:45)  HR: 74 (08-29 @ 05:30) (74 - 89)  BP: 101/62 (08-29 @ 05:30) (81/50 - 103/67)  RR: 20 (08-29 @ 05:30) (20 - 20)  SpO2: 100% (08-29 @ 05:30) (97% - 100%)  08-28-20 @ 07:01  -  08-29-20 @ 07:00  --------------------------------------------------------  IN: 3130 mL / OUT: 2475 mL / NET: 655 mL      General: alert and oriented, NAD  Resp: airway patent, respirations unlabored  CVS: regular rate and rhythm  Abdomen: soft, nontender, nondistended, wound vac in place  Extremities: no edema  Skin: warm, dry, appropriate color                          8.7    7.03  )-----------( 144      ( 28 Aug 2020 13:14 )             27.7   08-28    137  |  104  |  29<H>  ----------------------------<  105<H>  4.9   |  22  |  1.76<H>    Ca    8.6      28 Aug 2020 07:07  Phos  5.1     08-28  Mg     1.8     08-28

## 2020-08-30 ENCOUNTER — TRANSCRIPTION ENCOUNTER (OUTPATIENT)
Age: 75
End: 2020-08-30

## 2020-08-30 LAB
ANION GAP SERPL CALC-SCNC: 8 MMOL/L — SIGNIFICANT CHANGE UP (ref 5–17)
BUN SERPL-MCNC: 19 MG/DL — SIGNIFICANT CHANGE UP (ref 7–23)
CALCIUM SERPL-MCNC: 8.7 MG/DL — SIGNIFICANT CHANGE UP (ref 8.4–10.5)
CHLORIDE SERPL-SCNC: 110 MMOL/L — HIGH (ref 96–108)
CO2 SERPL-SCNC: 21 MMOL/L — LOW (ref 22–31)
CREAT SERPL-MCNC: 0.96 MG/DL — SIGNIFICANT CHANGE UP (ref 0.5–1.3)
GLUCOSE SERPL-MCNC: 96 MG/DL — SIGNIFICANT CHANGE UP (ref 70–99)
HCT VFR BLD CALC: 26.9 % — LOW (ref 34.5–45)
HGB BLD-MCNC: 8.7 G/DL — LOW (ref 11.5–15.5)
MAGNESIUM SERPL-MCNC: 1.7 MG/DL — SIGNIFICANT CHANGE UP (ref 1.6–2.6)
MCHC RBC-ENTMCNC: 29.8 PG — SIGNIFICANT CHANGE UP (ref 27–34)
MCHC RBC-ENTMCNC: 32.3 GM/DL — SIGNIFICANT CHANGE UP (ref 32–36)
MCV RBC AUTO: 92.1 FL — SIGNIFICANT CHANGE UP (ref 80–100)
NRBC # BLD: 0 /100 WBCS — SIGNIFICANT CHANGE UP (ref 0–0)
PHOSPHATE SERPL-MCNC: 1.6 MG/DL — LOW (ref 2.5–4.5)
PLATELET # BLD AUTO: 165 K/UL — SIGNIFICANT CHANGE UP (ref 150–400)
POTASSIUM SERPL-MCNC: 3.6 MMOL/L — SIGNIFICANT CHANGE UP (ref 3.5–5.3)
POTASSIUM SERPL-SCNC: 3.6 MMOL/L — SIGNIFICANT CHANGE UP (ref 3.5–5.3)
RBC # BLD: 2.92 M/UL — LOW (ref 3.8–5.2)
RBC # FLD: 15.9 % — HIGH (ref 10.3–14.5)
SODIUM SERPL-SCNC: 139 MMOL/L — SIGNIFICANT CHANGE UP (ref 135–145)
WBC # BLD: 5.53 K/UL — SIGNIFICANT CHANGE UP (ref 3.8–10.5)
WBC # FLD AUTO: 5.53 K/UL — SIGNIFICANT CHANGE UP (ref 3.8–10.5)

## 2020-08-30 RX ORDER — IBUPROFEN 200 MG
1 TABLET ORAL
Qty: 0 | Refills: 0 | DISCHARGE

## 2020-08-30 RX ORDER — OXYCODONE HYDROCHLORIDE 5 MG/1
1 TABLET ORAL
Qty: 0 | Refills: 0 | DISCHARGE
Start: 2020-08-30

## 2020-08-30 RX ORDER — MAGNESIUM SULFATE 500 MG/ML
2 VIAL (ML) INJECTION ONCE
Refills: 0 | Status: COMPLETED | OUTPATIENT
Start: 2020-08-30 | End: 2020-08-30

## 2020-08-30 RX ORDER — POTASSIUM PHOSPHATE, MONOBASIC POTASSIUM PHOSPHATE, DIBASIC 236; 224 MG/ML; MG/ML
30 INJECTION, SOLUTION INTRAVENOUS ONCE
Refills: 0 | Status: COMPLETED | OUTPATIENT
Start: 2020-08-30 | End: 2020-08-30

## 2020-08-30 RX ORDER — ACETAMINOPHEN 500 MG
3 TABLET ORAL
Qty: 0 | Refills: 0 | DISCHARGE
Start: 2020-08-30

## 2020-08-30 RX ADMIN — OXYCODONE HYDROCHLORIDE 10 MILLIGRAM(S): 5 TABLET ORAL at 10:05

## 2020-08-30 RX ADMIN — Medication 975 MILLIGRAM(S): at 06:09

## 2020-08-30 RX ADMIN — Medication 975 MILLIGRAM(S): at 13:02

## 2020-08-30 RX ADMIN — Medication 975 MILLIGRAM(S): at 17:32

## 2020-08-30 RX ADMIN — Medication 975 MILLIGRAM(S): at 23:43

## 2020-08-30 RX ADMIN — OXYCODONE HYDROCHLORIDE 10 MILLIGRAM(S): 5 TABLET ORAL at 22:22

## 2020-08-30 RX ADMIN — Medication 1 TABLET(S): at 12:33

## 2020-08-30 RX ADMIN — Medication 975 MILLIGRAM(S): at 12:32

## 2020-08-30 RX ADMIN — POTASSIUM PHOSPHATE, MONOBASIC POTASSIUM PHOSPHATE, DIBASIC 83.33 MILLIMOLE(S): 236; 224 INJECTION, SOLUTION INTRAVENOUS at 17:31

## 2020-08-30 RX ADMIN — OXYCODONE HYDROCHLORIDE 5 MILLIGRAM(S): 5 TABLET ORAL at 04:22

## 2020-08-30 RX ADMIN — SERTRALINE 200 MILLIGRAM(S): 25 TABLET, FILM COATED ORAL at 12:32

## 2020-08-30 RX ADMIN — Medication 975 MILLIGRAM(S): at 05:16

## 2020-08-30 RX ADMIN — PANTOPRAZOLE SODIUM 40 MILLIGRAM(S): 20 TABLET, DELAYED RELEASE ORAL at 05:16

## 2020-08-30 RX ADMIN — ENOXAPARIN SODIUM 30 MILLIGRAM(S): 100 INJECTION SUBCUTANEOUS at 16:58

## 2020-08-30 RX ADMIN — OXYCODONE HYDROCHLORIDE 10 MILLIGRAM(S): 5 TABLET ORAL at 10:35

## 2020-08-30 RX ADMIN — Medication 975 MILLIGRAM(S): at 18:02

## 2020-08-30 RX ADMIN — Medication 50 GRAM(S): at 16:58

## 2020-08-30 RX ADMIN — OXYCODONE HYDROCHLORIDE 5 MILLIGRAM(S): 5 TABLET ORAL at 03:24

## 2020-08-30 RX ADMIN — OXYCODONE HYDROCHLORIDE 10 MILLIGRAM(S): 5 TABLET ORAL at 21:22

## 2020-08-30 RX ADMIN — OXYCODONE HYDROCHLORIDE 5 MILLIGRAM(S): 5 TABLET ORAL at 00:20

## 2020-08-30 NOTE — DISCHARGE NOTE PROVIDER - NSDCACTIVITY_GEN_ALL_CORE
Bathing allowed/No heavy lifting/straining/Do not make important decisions/Do not drive or operate machinery

## 2020-08-30 NOTE — PROGRESS NOTE ADULT - SUBJECTIVE AND OBJECTIVE BOX
no events tolerating diet, +BM/flatus    abd soft inc clean +granulation              Objective:    MEDICATIONS  (STANDING):  acetaminophen   Tablet .. 975 milliGRAM(s) Oral every 6 hours  calcium carbonate   1250 mG (OsCal) 1 Tablet(s) Oral daily  enoxaparin Injectable 30 milliGRAM(s) SubCutaneous every 24 hours  pantoprazole    Tablet 40 milliGRAM(s) Oral before breakfast  sertraline 200 milliGRAM(s) Oral daily    MEDICATIONS  (PRN):  oxyCODONE    IR 5 milliGRAM(s) Oral every 4 hours PRN Moderate Pain (4 - 6)  oxyCODONE    IR 10 milliGRAM(s) Oral every 4 hours PRN Severe Pain (7 - 10)      Vital Signs Last 24 Hrs  T(C): 36.7 (30 Aug 2020 06:04), Max: 36.8 (29 Aug 2020 13:27)  T(F): 98.1 (30 Aug 2020 06:04), Max: 98.2 (29 Aug 2020 13:27)  HR: 72 (30 Aug 2020 06:04) (72 - 90)  BP: 124/79 (30 Aug 2020 06:04) (100/62 - 131/60)  BP(mean): --  RR: 18 (30 Aug 2020 06:04) (18 - 19)  SpO2: 93% (30 Aug 2020 06:04) (93% - 100%)    I&O's Detail    29 Aug 2020 07:01  -  30 Aug 2020 07:00  --------------------------------------------------------  IN:    Oral Fluid: 620 mL  Total IN: 620 mL    OUT:    Indwelling Catheter - Urethral: 320 mL    Voided: 1075 mL  Total OUT: 1395 mL    Total NET: -775 mL          Daily     Daily     LABS:                        8.7    5.53  )-----------( 165      ( 30 Aug 2020 07:21 )             26.9     08-30    139  |  110<H>  |  19  ----------------------------<  96  3.6   |  21<L>  |  0.96    Ca    8.7      30 Aug 2020 07:17  Phos  1.6     08-30  Mg     1.7     08-30            RADIOLOGY & ADDITIONAL STUDIES:

## 2020-08-30 NOTE — DISCHARGE NOTE PROVIDER - NSDCCPCAREPLAN_GEN_ALL_CORE_FT
PRINCIPAL DISCHARGE DIAGNOSIS  Diagnosis: Diverticulitis of large intestine with perforation without bleeding  Assessment and Plan of Treatment:       SECONDARY DISCHARGE DIAGNOSES  Diagnosis: Status post colostomy takedown  Assessment and Plan of Treatment: PRINCIPAL DISCHARGE DIAGNOSIS  Diagnosis: Diverticulitis of large intestine with perforation without bleeding  Assessment and Plan of Treatment: - wound vac needs to be changed 3X/ weekly by VNS        SECONDARY DISCHARGE DIAGNOSES  Diagnosis: Status post colostomy takedown  Assessment and Plan of Treatment:

## 2020-08-30 NOTE — PROGRESS NOTE ADULT - ATTENDING COMMENTS
s/p alexandrea's reversal  -LRD  -Vac dressing  -Cr has normalized with good UOP overnight  -dvt ppx  -dispo planning
s/p aleaxndrea's reversal  -+BM/flatus  -monitor hct  -dvt ppx  -oob  -pain control

## 2020-08-30 NOTE — DISCHARGE NOTE PROVIDER - CARE PROVIDER_API CALL
Lm Castorena  COLON/RECTAL SURGERY  [address] [place] [address], [address]  [place] [address]  Phone: [phone]  Fax: [phone]  Established Patient  Follow Up Time: 1 week

## 2020-08-30 NOTE — DISCHARGE NOTE PROVIDER - NSDCMRMEDTOKEN_GEN_ALL_CORE_FT
acetaminophen 325 mg oral tablet: 3 tab(s) orally every 6 hours  biotin: 1 tab(s) orally once a day  calcium carbonate: 1 tab(s) orally once a day  omeprazole 20 mg oral delayed release tablet: 1 tab(s) orally once a day  oxyCODONE 5 mg oral tablet: 1 tab(s) orally every 4 hours, As needed, Moderate Pain (4 - 6)  Zoloft 100 mg oral tablet: 2 tab(s) orally once a day

## 2020-08-30 NOTE — DISCHARGE NOTE PROVIDER - HOSPITAL COURSE
Mrs. Rowe is a 74 year old with PMH of Spinal stenosis, SPL, SOB with activity and OA s/p bilateral knee replacement most recent 1/27/2020 and 48 hours later had acute diverticulitis with large intestinal perforation and abscess without bleed s/p Salas procedure returns for scheduled 8/26 cystoscopy insertion of ureteral catheters, ERAS, reversal of Salas.      Pt received ERAS reversal of Salas, wound vac, transfered from SICU to the floors, HD stable and recovering appropriately. She was advanced to [facility], OOB to chair. Akins remaind for additional 24hrs to monitor UOP.    POD#1: 8/28    - R and L ucath removed    - Patient started on PCA (no Duramorph preop due to spinal stenosis)    - Clears diet    Overnight events:     - Overnight, pt was hypotensive with SBP in the 70s but asymptomatic. Labs were drawn showing Cr of 1.77 up from 1.06 preop. Pt was given 2d179s bolus LR. AM blood pressure merrill to 90/54. Pt is feeling well without symptoms of hypovolemia but is hypotensive with decreased urine output and rising creatinine. Her blood pressure is responsive to fluid bolus.         8/29 POD#2: pt hemodynamically stable, cre down trends. Advanced to low fiber diet, Akins removed, passed TOV. - anticipate home with vac, PT consultaion. Pt ambulating with assistance.        8/30 POD#3: Mrs. Rowe is a 74 year old with PMH of Spinal stenosis, SPL, SOB with activity and OA s/p bilateral knee replacement most recent 1/27/2020 and 48 hours later had acute diverticulitis with large intestinal perforation and abscess without bleed s/p Salas procedure returns for scheduled 8/26 cystoscopy insertion of ureteral catheters, ERAS, reversal of Salas.      Pt received ERAS reversal of Salas, wound vac, transfered from SICU to the floors, HD stable and recovering appropriately. She was advanced to [facility], OOB to chair. Akins remaind for additional 24hrs to monitor UOP.    POD#1: 8/28    - R and L ucath removed    - Patient started on PCA (no Duramorph preop due to spinal stenosis)    - Clears diet    Overnight events:     - Overnight, pt was hypotensive with SBP in the 70s but asymptomatic. Labs were drawn showing Cr of 1.77 up from 1.06 preop. Pt was given 9y094m bolus LR. AM blood pressure merrill to 90/54. Pt is feeling well without symptoms of hypovolemia but is hypotensive with decreased urine output and rising creatinine. Her blood pressure is responsive to fluid bolus.         8/29 POD#2: pt hemodynamically stable, cre down trends. Advanced to low fiber diet, Akins removed, passed TOV. - anticipate home with vac, PT consultaion. Pt ambulating with assistance.    8/30 POD#3: Vaccuum dressing changed    8/31 POD#4: DC today

## 2020-08-31 ENCOUNTER — TRANSCRIPTION ENCOUNTER (OUTPATIENT)
Age: 75
End: 2020-08-31

## 2020-08-31 LAB
ANION GAP SERPL CALC-SCNC: 8 MMOL/L — SIGNIFICANT CHANGE UP (ref 5–17)
BUN SERPL-MCNC: 20 MG/DL — SIGNIFICANT CHANGE UP (ref 7–23)
CALCIUM SERPL-MCNC: 8.9 MG/DL — SIGNIFICANT CHANGE UP (ref 8.4–10.5)
CHLORIDE SERPL-SCNC: 109 MMOL/L — HIGH (ref 96–108)
CO2 SERPL-SCNC: 23 MMOL/L — SIGNIFICANT CHANGE UP (ref 22–31)
CREAT SERPL-MCNC: 0.92 MG/DL — SIGNIFICANT CHANGE UP (ref 0.5–1.3)
GLUCOSE SERPL-MCNC: 89 MG/DL — SIGNIFICANT CHANGE UP (ref 70–99)
HCT VFR BLD CALC: 29.7 % — LOW (ref 34.5–45)
HGB BLD-MCNC: 9.4 G/DL — LOW (ref 11.5–15.5)
MAGNESIUM SERPL-MCNC: 2.1 MG/DL — SIGNIFICANT CHANGE UP (ref 1.6–2.6)
MCHC RBC-ENTMCNC: 29.7 PG — SIGNIFICANT CHANGE UP (ref 27–34)
MCHC RBC-ENTMCNC: 31.6 GM/DL — LOW (ref 32–36)
MCV RBC AUTO: 93.7 FL — SIGNIFICANT CHANGE UP (ref 80–100)
NRBC # BLD: 0 /100 WBCS — SIGNIFICANT CHANGE UP (ref 0–0)
PHOSPHATE SERPL-MCNC: 3 MG/DL — SIGNIFICANT CHANGE UP (ref 2.5–4.5)
PLATELET # BLD AUTO: 192 K/UL — SIGNIFICANT CHANGE UP (ref 150–400)
POTASSIUM SERPL-MCNC: 4.2 MMOL/L — SIGNIFICANT CHANGE UP (ref 3.5–5.3)
POTASSIUM SERPL-SCNC: 4.2 MMOL/L — SIGNIFICANT CHANGE UP (ref 3.5–5.3)
RBC # BLD: 3.17 M/UL — LOW (ref 3.8–5.2)
RBC # FLD: 16.1 % — HIGH (ref 10.3–14.5)
SODIUM SERPL-SCNC: 140 MMOL/L — SIGNIFICANT CHANGE UP (ref 135–145)
WBC # BLD: 4.89 K/UL — SIGNIFICANT CHANGE UP (ref 3.8–10.5)
WBC # FLD AUTO: 4.89 K/UL — SIGNIFICANT CHANGE UP (ref 3.8–10.5)

## 2020-08-31 RX ORDER — OXYCODONE HYDROCHLORIDE 5 MG/1
1 TABLET ORAL
Qty: 20 | Refills: 0
Start: 2020-08-31 | End: 2020-09-04

## 2020-08-31 RX ORDER — ACETAMINOPHEN 500 MG
650 TABLET ORAL EVERY 6 HOURS
Refills: 0 | Status: DISCONTINUED | OUTPATIENT
Start: 2020-08-31 | End: 2020-09-01

## 2020-08-31 RX ADMIN — Medication 975 MILLIGRAM(S): at 00:43

## 2020-08-31 RX ADMIN — SERTRALINE 200 MILLIGRAM(S): 25 TABLET, FILM COATED ORAL at 12:25

## 2020-08-31 RX ADMIN — PANTOPRAZOLE SODIUM 40 MILLIGRAM(S): 20 TABLET, DELAYED RELEASE ORAL at 05:38

## 2020-08-31 RX ADMIN — Medication 1 TABLET(S): at 12:25

## 2020-08-31 RX ADMIN — OXYCODONE HYDROCHLORIDE 10 MILLIGRAM(S): 5 TABLET ORAL at 04:11

## 2020-08-31 RX ADMIN — ENOXAPARIN SODIUM 30 MILLIGRAM(S): 100 INJECTION SUBCUTANEOUS at 22:22

## 2020-08-31 RX ADMIN — OXYCODONE HYDROCHLORIDE 10 MILLIGRAM(S): 5 TABLET ORAL at 18:53

## 2020-08-31 RX ADMIN — OXYCODONE HYDROCHLORIDE 10 MILLIGRAM(S): 5 TABLET ORAL at 20:38

## 2020-08-31 RX ADMIN — Medication 975 MILLIGRAM(S): at 05:37

## 2020-08-31 RX ADMIN — OXYCODONE HYDROCHLORIDE 10 MILLIGRAM(S): 5 TABLET ORAL at 05:11

## 2020-08-31 RX ADMIN — Medication 975 MILLIGRAM(S): at 06:38

## 2020-08-31 NOTE — PROGRESS NOTE ADULT - SUBJECTIVE AND OBJECTIVE BOX
No acute events 24hrs.    Patient seen and examined at bedside. HD stable, denies CP, SOB or tachy  Harinder Diet, OOB as tolerated. +/+, no N/v.      Physical Exam  General: NAD, pleasant and cooperative  Respiratory: patent airways   Abdominal: S, NT, ND.   Wound vac: clean and dry on suction.  Extremities: No LAURA    Vital Signs Last 24 Hrs  T(C): 36.4 (31 Aug 2020 05:40), Max: 36.7 (30 Aug 2020 16:13)  T(F): 97.5 (31 Aug 2020 05:40), Max: 98.1 (30 Aug 2020 16:13)  HR: 74 (31 Aug 2020 05:40) (71 - 83)  BP: 150/78 (31 Aug 2020 05:40) (116/75 - 150/78)  BP(mean): --  RR: 18 (31 Aug 2020 05:40) (18 - 18)  SpO2: 96% (31 Aug 2020 05:40) (96% - 97%)    I&O's Detail    30 Aug 2020 07:01  -  31 Aug 2020 07:00  --------------------------------------------------------  IN:    Oral Fluid: 1160 mL    Solution: 500 mL  Total IN: 1660 mL    OUT:    Voided: 900 mL  Total OUT: 900 mL    Total NET: 760 mL          08-30    139  |  110<H>  |  19  ----------------------------<  96  3.6   |  21<L>  |  0.96    Ca    8.7      30 Aug 2020 07:17  Phos  1.6     08-30  Mg     1.7     08-30                              9.4    4.89  )-----------( 192      ( 31 Aug 2020 07:18 )             29.7           MEDICATIONS  (STANDING):  calcium carbonate   1250 mG (OsCal) 1 Tablet(s) Oral daily  enoxaparin Injectable 30 milliGRAM(s) SubCutaneous every 24 hours  pantoprazole    Tablet 40 milliGRAM(s) Oral before breakfast  sertraline 200 milliGRAM(s) Oral daily    MEDICATIONS  (PRN):  oxyCODONE    IR 5 milliGRAM(s) Oral every 4 hours PRN Moderate Pain (4 - 6)  oxyCODONE    IR 10 milliGRAM(s) Oral every 4 hours PRN Severe Pain (7 - 10)

## 2020-08-31 NOTE — DISCHARGE NOTE NURSING/CASE MANAGEMENT/SOCIAL WORK - NSSCCARECORD_GEN_ALL_CORE
If provider orders tests at today's visit, patient would like to be contacted via Telephone.  If to contact patient by phone, patient's preferred phone # is [phone] (Cell) and it is OK to leave message on voice mail or with family member.  If medications are ordered at today's visit, the pharmacy name/location patient would like them to be sent to is   [facility] [address] [place] [address] - [facility] [address] AT [facility] [address]  [facility] [address]  [facility] [address]  Phone: [phone] Fax: [phone]       Home Care Agency/Durable Medical Equipment Agency

## 2020-08-31 NOTE — PROVIDER CONTACT NOTE (OTHER) - ACTION/TREATMENT ORDERED:
Repeated BP again 96/60, will repeat bp again soon
MD aware. no bolus due to concern of fluid overload. no interventions if pt asymptomatic. will continue to monitor.
MD aware. will come to bedside to assess  will continue to monitor
No further interventions at this time, will continue to monitor.
Provider at bedside. No further interventions at this time. Continue to monitor.

## 2020-08-31 NOTE — PROGRESS NOTE ADULT - ASSESSMENT
73 yo F POD#4 s/p open reversal of Luisana with previous history of perforated diverticulitis, hemodynamically appropriate, on a low fiber diet. pt is HD stable.    Plan:   - c/w LRD  - Dispo home with home wound vac. Pending with delivery.          [id number]

## 2020-08-31 NOTE — DISCHARGE NOTE NURSING/CASE MANAGEMENT/SOCIAL WORK - PATIENT PORTAL LINK FT
You can access the FollowMyHealth Patient Portal offered by [facility] by registering at the following website: [email or web address]. By joining [employer]’s FollowMyHealth portal, you will also be able to view your health information using other applications (apps) compatible with our system.

## 2020-08-31 NOTE — PROVIDER CONTACT NOTE (OTHER) - ASSESSMENT
Pt refusing Lovenox injection. Pt educated on risks and importance of Lovenox. Pt states understanding is and still refusing. Pt states she is leaving later this evening and does not want to take

## 2020-08-31 NOTE — DISCHARGE NOTE NURSING/CASE MANAGEMENT/SOCIAL WORK - NSSCTYPOFSERV_GEN_ALL_CORE
Scheduled for  start of care  9/120   Skilled RN  eval  and   VAC care.   Agency will contact you to set up time of  visit.   If you have  any questions to same  feel free to contact agency

## 2020-08-31 NOTE — CHART NOTE - NSCHARTNOTEFT_GEN_A_CORE
Patient wound vac arrived at 10PM and she refused to leave to home due to lack of transportation. Patient also refusing to take her dosage of Lovenox. The importance of DVT prophylaxis was stressed, Patient wound vac arrived at 10PM and she refused to leave to home due to lack of transportation. Patient also refusing to take her dosage of Lovenox. The importance of DVT prophylaxis was stressed, Patient was amenable to taking Lovenox after deciding to stay overnight.

## 2020-09-01 VITALS
DIASTOLIC BLOOD PRESSURE: 79 MMHG | TEMPERATURE: 98 F | SYSTOLIC BLOOD PRESSURE: 134 MMHG | HEART RATE: 74 BPM | OXYGEN SATURATION: 97 % | RESPIRATION RATE: 18 BRPM

## 2020-09-01 PROCEDURE — 97116 GAIT TRAINING THERAPY: CPT

## 2020-09-01 PROCEDURE — 80048 BASIC METABOLIC PNL TOTAL CA: CPT

## 2020-09-01 PROCEDURE — 84132 ASSAY OF SERUM POTASSIUM: CPT

## 2020-09-01 PROCEDURE — C9399: CPT

## 2020-09-01 PROCEDURE — 86901 BLOOD TYPING SEROLOGIC RH(D): CPT

## 2020-09-01 PROCEDURE — 83605 ASSAY OF LACTIC ACID: CPT

## 2020-09-01 PROCEDURE — C1889: CPT

## 2020-09-01 PROCEDURE — 88304 TISSUE EXAM BY PATHOLOGIST: CPT

## 2020-09-01 PROCEDURE — 97530 THERAPEUTIC ACTIVITIES: CPT

## 2020-09-01 PROCEDURE — 85018 HEMOGLOBIN: CPT

## 2020-09-01 PROCEDURE — 88307 TISSUE EXAM BY PATHOLOGIST: CPT

## 2020-09-01 PROCEDURE — 97162 PT EVAL MOD COMPLEX 30 MIN: CPT

## 2020-09-01 PROCEDURE — 86850 RBC ANTIBODY SCREEN: CPT

## 2020-09-01 PROCEDURE — 86870 RBC ANTIBODY IDENTIFICATION: CPT

## 2020-09-01 PROCEDURE — 86900 BLOOD TYPING SEROLOGIC ABO: CPT

## 2020-09-01 PROCEDURE — 97605 NEG PRS WND THER DME<=50SQCM: CPT

## 2020-09-01 PROCEDURE — 82962 GLUCOSE BLOOD TEST: CPT

## 2020-09-01 PROCEDURE — 82435 ASSAY OF BLOOD CHLORIDE: CPT

## 2020-09-01 PROCEDURE — 85014 HEMATOCRIT: CPT

## 2020-09-01 PROCEDURE — 82803 BLOOD GASES ANY COMBINATION: CPT

## 2020-09-01 PROCEDURE — 84100 ASSAY OF PHOSPHORUS: CPT

## 2020-09-01 PROCEDURE — 82330 ASSAY OF CALCIUM: CPT

## 2020-09-01 PROCEDURE — 84484 ASSAY OF TROPONIN QUANT: CPT

## 2020-09-01 PROCEDURE — 85027 COMPLETE CBC AUTOMATED: CPT

## 2020-09-01 PROCEDURE — 88305 TISSUE EXAM BY PATHOLOGIST: CPT

## 2020-09-01 PROCEDURE — 82947 ASSAY GLUCOSE BLOOD QUANT: CPT

## 2020-09-01 PROCEDURE — C1758: CPT

## 2020-09-01 PROCEDURE — 84295 ASSAY OF SERUM SODIUM: CPT

## 2020-09-01 PROCEDURE — 83735 ASSAY OF MAGNESIUM: CPT

## 2020-09-01 PROCEDURE — 85730 THROMBOPLASTIN TIME PARTIAL: CPT

## 2020-09-01 PROCEDURE — 86922 COMPATIBILITY TEST ANTIGLOB: CPT

## 2020-09-01 PROCEDURE — 93005 ELECTROCARDIOGRAM TRACING: CPT

## 2020-09-01 PROCEDURE — 85610 PROTHROMBIN TIME: CPT

## 2020-09-01 RX ADMIN — OXYCODONE HYDROCHLORIDE 10 MILLIGRAM(S): 5 TABLET ORAL at 05:38

## 2020-09-01 RX ADMIN — PANTOPRAZOLE SODIUM 40 MILLIGRAM(S): 20 TABLET, DELAYED RELEASE ORAL at 05:37

## 2020-09-01 RX ADMIN — OXYCODONE HYDROCHLORIDE 10 MILLIGRAM(S): 5 TABLET ORAL at 06:38

## 2020-09-01 RX ADMIN — Medication 650 MILLIGRAM(S): at 00:01

## 2020-09-01 RX ADMIN — Medication 650 MILLIGRAM(S): at 01:00

## 2020-09-01 RX ADMIN — OXYCODONE HYDROCHLORIDE 10 MILLIGRAM(S): 5 TABLET ORAL at 10:02

## 2020-09-01 NOTE — PROGRESS NOTE ADULT - ASSESSMENT
73 yo F POD#5 s/p open reversal of Luisana with previous history of perforated diverticulitis, hemodynamically appropriate, on a low fiber diet. pt is HD stable. Home wound vac arrived yesterday at 10 pm, pt preferred not leaving late. This morning is stable and ready to go home.    Plan:   - Dispo home with home wound vac and with home PT.  - F/u in 1-2wk with Dr. Castorena     [id number]

## 2020-09-01 NOTE — PROGRESS NOTE ADULT - SUBJECTIVE AND OBJECTIVE BOX
24hrs: Wound vac was delivered late yesterday around 10 pm. pt decided to not leave at that late time.    Patient seen and examined at bedside. Pleasant and cooperative, stated that she is thrilled and ready to go home today.  Passing gaz and BM.  Denies abdominal pain, fever, SOB, CP. OOB and ambulating.    Physical Exam  General: NAD  Respiratory: Patent air ways   Abdominal: S, ND, ND.   Wound vac: dressing C/D/I  Extremities: No LAURA.    Vital Signs Last 24 Hrs  T(C): 36.9 (01 Sep 2020 04:41), Max: 37.3 (31 Aug 2020 21:18)  T(F): 98.4 (01 Sep 2020 04:41), Max: 99.1 (31 Aug 2020 21:18)  HR: 74 (01 Sep 2020 04:41) (73 - 82)  BP: 134/67 (01 Sep 2020 04:41) (122/71 - 144/82)  BP(mean): --  RR: 18 (01 Sep 2020 04:41) (18 - 18)  SpO2: 97% (01 Sep 2020 04:41) (95% - 98%)    I&O's Detail    30 Aug 2020 07:01  -  31 Aug 2020 07:00  --------------------------------------------------------  IN:    Oral Fluid: 1160 mL    Solution: 500 mL  Total IN: 1660 mL    OUT:    Voided: 900 mL  Total OUT: 900 mL    Total NET: 760 mL      31 Aug 2020 07:01  -  01 Sep 2020 06:53  --------------------------------------------------------  IN:    Oral Fluid: 840 mL  Total IN: 840 mL    OUT:    Voided: 2550 mL  Total OUT: 2550 mL    Total NET: -1710 mL        08-31    140  |  109<H>  |  20  ----------------------------<  89  4.2   |  23  |  0.92    Ca    8.9      31 Aug 2020 07:18  Phos  3.0     08-31  Mg     2.1     08-31                              9.4    4.89  )-----------( 192      ( 31 Aug 2020 07:18 )             29.7           MEDICATIONS  (STANDING):  calcium carbonate   1250 mG (OsCal) 1 Tablet(s) Oral daily  enoxaparin Injectable 30 milliGRAM(s) SubCutaneous every 24 hours  pantoprazole    Tablet 40 milliGRAM(s) Oral before breakfast  sertraline 200 milliGRAM(s) Oral daily    MEDICATIONS  (PRN):  acetaminophen   Tablet .. 650 milliGRAM(s) Oral every 6 hours PRN Mild Pain (1 - 3)  oxyCODONE    IR 5 milliGRAM(s) Oral every 4 hours PRN Moderate Pain (4 - 6)  oxyCODONE    IR 10 milliGRAM(s) Oral every 4 hours PRN Severe Pain (7 - 10)

## 2020-09-04 PROBLEM — M19.90 UNSPECIFIED OSTEOARTHRITIS, UNSPECIFIED SITE: Chronic | Status: ACTIVE | Noted: 2020-08-20

## 2020-09-04 PROBLEM — K57.92 DIVERTICULITIS OF INTESTINE, PART UNSPECIFIED, WITHOUT PERFORATION OR ABSCESS WITHOUT BLEEDING: Chronic | Status: ACTIVE | Noted: 2020-08-20

## 2020-09-14 ENCOUNTER — APPOINTMENT (OUTPATIENT)
Dept: COLORECTAL SURGERY | Facility: CLINIC | Age: 75
End: 2020-09-14
Payer: MEDICARE

## 2020-09-14 VITALS
DIASTOLIC BLOOD PRESSURE: 70 MMHG | OXYGEN SATURATION: 100 % | TEMPERATURE: 97.8 F | SYSTOLIC BLOOD PRESSURE: 104 MMHG | HEART RATE: 109 BPM

## 2020-09-14 PROCEDURE — 99024 POSTOP FOLLOW-UP VISIT: CPT

## 2020-09-15 NOTE — HISTORY OF PRESENT ILLNESS
[FreeTextEntry1] : Pleasant 74 year old female who presents for her first postoperative visit. Patient is approximately 2 weeks status post reversal of Luisana procedure. The initial procedure was performed for perforated diverticulitis.\par \par Patient had a rapid and uneventful postoperative recuperation. She currently looks and feels well. She has no significant incisional pain. She is tolerating diet and having formed bowel movements.\par \par Physical examination of the abdomen reveals a soft, nontender, nondistended abdomen. The existent VAC device was taken down. The wound and the colostomy site are almost totally healed. Surgical staples were removed. She no longer requires the VAC device.\par \par Patient was advised to remain on a low-residue diet. I want her to shower daily and replace a dry sterile dressing on her wounds. I will see her in 4 weeks for sigmoidoscopy under sedation.

## 2020-10-01 RX ORDER — METRONIDAZOLE 500 MG/1
500 TABLET ORAL
Qty: 3 | Refills: 0 | Status: DISCONTINUED | COMMUNITY
Start: 2020-07-22 | End: 2020-10-01

## 2020-10-01 RX ORDER — NEOMYCIN SULFATE 500 MG/1
500 TABLET ORAL
Qty: 3 | Refills: 0 | Status: DISCONTINUED | COMMUNITY
Start: 2020-07-22 | End: 2020-10-01

## 2020-10-01 RX ORDER — OXYCODONE HYDROCHLORIDE AND IBUPROFEN 5; 400 MG/1; MG/1
5-400 TABLET, FILM COATED ORAL
Refills: 0 | Status: DISCONTINUED | COMMUNITY
End: 2020-10-01

## 2020-10-10 DIAGNOSIS — Z01.818 ENCOUNTER FOR OTHER PREPROCEDURAL EXAMINATION: ICD-10-CM

## 2020-10-11 ENCOUNTER — APPOINTMENT (OUTPATIENT)
Dept: DISASTER EMERGENCY | Facility: CLINIC | Age: 75
End: 2020-10-11

## 2020-10-14 ENCOUNTER — APPOINTMENT (OUTPATIENT)
Dept: COLORECTAL SURGERY | Facility: CLINIC | Age: 75
End: 2020-10-14
Payer: MEDICARE

## 2020-10-14 PROCEDURE — 45330 DIAGNOSTIC SIGMOIDOSCOPY: CPT | Mod: 79

## 2021-01-17 LAB — SARS-COV-2 N GENE NPH QL NAA+PROBE: NOT DETECTED

## 2021-04-21 ENCOUNTER — APPOINTMENT (OUTPATIENT)
Dept: COLORECTAL SURGERY | Facility: CLINIC | Age: 76
End: 2021-04-21
Payer: MEDICARE

## 2021-04-21 PROCEDURE — 99212 OFFICE O/P EST SF 10 MIN: CPT | Mod: 25

## 2021-04-21 PROCEDURE — 45300 PROCTOSIGMOIDOSCOPY DX: CPT

## 2021-04-22 NOTE — HISTORY OF PRESENT ILLNESS
[FreeTextEntry1] : 75-year-old white female who is well known to me. In October of 2020 I reversed her Luisana. She had the procedure performed for perforated diverticulitis. Patient did well after that procedure.\par \par She presents today with a chief complaint of frequent small bowel movements and an incomplete sense of evacuation. She is also having difficulty with perianal hygiene.\par \par Physical examination reveals a soft, nontender, nondistended abdomen. Her low midline scar and left lower quadrant colostomy site were fully healed with no evidence of hernias. Inspection of the anorectal area is unremarkable. There is some perianal erythema. Digital exam reveals no palpable mass. 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 I reassured her that nothing is mechanically wrong and that there is no underlying disease process. My recommendation was to use a fiber product twice daily and increase her fluid intake. I have also instructed her on careful use of Imodium for social settings to help with her frequency.\par \par I will see her as needed.

## 2022-04-20 NOTE — OCCUPATIONAL THERAPY INITIAL EVALUATION ADULT - BALANCE TRAINING, PT EVAL
PA for Nurtec Pt will increase dynamic standing balance to normal to increase performance with ADLs in 2 weeks.

## 2022-07-13 ENCOUNTER — APPOINTMENT (OUTPATIENT)
Dept: COLORECTAL SURGERY | Facility: CLINIC | Age: 77
End: 2022-07-13

## 2022-07-13 PROCEDURE — 99213 OFFICE O/P EST LOW 20 MIN: CPT

## 2022-07-13 NOTE — HISTORY OF PRESENT ILLNESS
[FreeTextEntry1] : Pleasant 76-year-old female who is well-known to me.\par \par During the pandemic the patient developed perforated diverticulitis and required an emergency Luisana procedure.  I eventually performed a colonoscopy on her via the anus and ostomy which was unremarkable.  I reversed her Luisana in October 2020.  She experienced anterior resection syndrome which subsequently improved.  Recent blood work has shown significant anemia with a hemoglobin of 9.8.  She reports no rectal bleeding.\par \par The anemia is new onset and needs to be worked up.  Though she did have a colonoscopy almost 2 years ago, I believe we should start by repeat colonoscopy.  If this is negative she will need an upper endoscopy.  And if this is negative, she would then require referral to hematology.\par \par For now we will schedule colonoscopy

## 2022-07-15 ENCOUNTER — APPOINTMENT (OUTPATIENT)
Dept: ORTHOPEDIC SURGERY | Facility: CLINIC | Age: 77
End: 2022-07-15

## 2022-07-15 LAB — SARS-COV-2 N GENE NPH QL NAA+PROBE: NOT DETECTED

## 2022-07-18 ENCOUNTER — APPOINTMENT (OUTPATIENT)
Dept: COLORECTAL SURGERY | Facility: CLINIC | Age: 77
End: 2022-07-18

## 2022-07-18 PROCEDURE — 45378 DIAGNOSTIC COLONOSCOPY: CPT

## 2022-08-24 NOTE — CHART NOTE - NSCHARTNOTEFT_GEN_A_CORE
Called to patient room for hypotension, most recent SBP 70s  Patient asymptomatic, feeling well. Tolerating clears  No HA, Lightheadedness, N/V, SOB, CP. No pain  Abdomen soft, NTND, wound vac in place with good seal.  UOP 100cc over last 4 hours  Discussed with colorectal fellow on call  Will order CBC, BMP, Troponins, and EKG    Miki Rae  [id number] Called to patient room for hypotension, most recent SBP 70s  Patient asymptomatic, feeling well. Tolerating clears  No HA, Lightheadedness, N/V, SOB, CP. No pain  Abdomen soft, NTND, wound vac in place with good seal.  UOP 100cc over last 4 hours  Discussed with colorectal fellow on call  Hypotension from intravascular depletion likely, need to rule out other causes.  Patient did not get spinal anesthesia in OR  Will order 500cc bolus LR, CBC, BMP, Troponins, and EKG      Miki Rae  [id number] Cheilitis Aggressive Treatment: - Recommended application of Dr. Dan?s Cortibalm used several times per day (as often as every hour you are awake when very severe lip dryness is present).\\n- Once improved switch back to Vaseline or Aquaphor numerous times a day and before going to bed.

## 2022-08-29 NOTE — PROVIDER CONTACT NOTE (OTHER) - REASON
Blood tinged stool PAST MEDICAL HISTORY:  Anxiety     Anxiety     History of antiphospholipid antibody syndrome     Hypertension     Hypertension, unspecified type     Idiopathic thrombocytopenic purpura (ITP)

## 2023-04-11 ENCOUNTER — APPOINTMENT (OUTPATIENT)
Dept: PAIN MANAGEMENT | Facility: CLINIC | Age: 78
End: 2023-04-11
Payer: MEDICARE

## 2023-04-11 VITALS
DIASTOLIC BLOOD PRESSURE: 65 MMHG | HEART RATE: 80 BPM | HEIGHT: 63 IN | WEIGHT: 129.2 LBS | BODY MASS INDEX: 22.89 KG/M2 | SYSTOLIC BLOOD PRESSURE: 135 MMHG

## 2023-04-11 DIAGNOSIS — M51.36 OTHER INTERVERTEBRAL DISC DEGENERATION, LUMBAR REGION: ICD-10-CM

## 2023-04-11 DIAGNOSIS — R26.9 UNSPECIFIED ABNORMALITIES OF GAIT AND MOBILITY: ICD-10-CM

## 2023-04-11 DIAGNOSIS — M41.9 SCOLIOSIS, UNSPECIFIED: ICD-10-CM

## 2023-04-11 PROCEDURE — 99214 OFFICE O/P EST MOD 30 MIN: CPT

## 2023-04-11 PROCEDURE — 99204 OFFICE O/P NEW MOD 45 MIN: CPT

## 2023-04-11 RX ORDER — FLUTICASONE PROPIONATE 50 UG/1
50 SPRAY, METERED NASAL DAILY
Qty: 1 | Refills: 5 | Status: COMPLETED | COMMUNITY
Start: 2018-06-04 | End: 2023-04-11

## 2023-04-11 RX ORDER — DOCUSATE SODIUM 100 MG/1
CAPSULE ORAL
Refills: 0 | Status: COMPLETED | COMMUNITY
End: 2023-04-11

## 2023-04-11 NOTE — ASSESSMENT
[FreeTextEntry1] : Patient with chronic low back pain associated with gait instability. Most recent MRI reviewed and etiology likely multifactorial- 2/2 severe lumbar spinal stenosis, scoliosis and spondylolisthesis. Upon review of imaging, would not recommend MILD procedure given risk of instability. Would recommend NSGY evaluation however patient not amenable to surgical intervention at this time. We discussed importance of using rollator/walker with ambulation to decrease risk of fall. Continue HEP as tolerated. \par \par The patient is a candidate for epidural injections as a palliative means to manage her pain.  She will call to schedule a Lumbar Epidural Steroid Injection at L2-3.\par \par The potential benefits as well as rare but possible risks were reviewed with the patient.  These risks including infection including epidural abscess, meningitis, osteomyelitis, and discitis, bleeding including epidural hematoma, nerve injury, paralysis, failure to relieve pain or worse pain, headache, pneumothorax, elevated blood sugars, allergic reactions, adverse reactions to medications, vasovagal reactions, falls, etc. Following that discussion, we decided together that the potential benefits outweigh the potential risks and we decided to proceed with scheduling the procedure.  I answered all the patient's questions about the procedure including the technical details of the procedure and also offered that if any other questions arise we will also be happy to answer those on the day of the procedure. All questions today were answered to the patient’s satisfaction, and the patient stated their verbal understanding.  \par \par \par

## 2023-04-11 NOTE — REVIEW OF SYSTEMS
[Back Pain] : back pain [Muscle Pain] : muscle pain [Joint Pain] : joint pain [Decreased ROM] : decreased range of motion [Weakness] : weakness [Negative] : Heme/Lymph

## 2023-04-11 NOTE — PHYSICAL EXAM
[Normal muscle bulk without asymmetry] : normal muscle bulk without asymmetry [Paraspinal Tenderness] : paraspinal tenderness [Antalgic] : antalgic [Walker] : ambulates with walker [Normal] : Normal affect [de-identified] : +scoliosis

## 2023-04-11 NOTE — REASON FOR VISIT
[Initial Visit] : an initial pain management visit [Spouse] : spouse [FreeTextEntry2] : low back pain

## 2023-11-09 ENCOUNTER — APPOINTMENT (OUTPATIENT)
Dept: ORTHOPEDIC SURGERY | Facility: CLINIC | Age: 78
End: 2023-11-09

## 2023-11-10 ENCOUNTER — APPOINTMENT (OUTPATIENT)
Dept: ORTHOPEDIC SURGERY | Facility: CLINIC | Age: 78
End: 2023-11-10
Payer: MEDICARE

## 2023-11-10 VITALS — WEIGHT: 129 LBS | HEIGHT: 63 IN | BODY MASS INDEX: 22.86 KG/M2

## 2023-11-10 DIAGNOSIS — M54.16 RADICULOPATHY, LUMBAR REGION: ICD-10-CM

## 2023-11-10 PROCEDURE — 99214 OFFICE O/P EST MOD 30 MIN: CPT

## 2023-11-10 PROCEDURE — 72100 X-RAY EXAM L-S SPINE 2/3 VWS: CPT

## 2023-11-10 PROCEDURE — 73502 X-RAY EXAM HIP UNI 2-3 VIEWS: CPT

## 2023-11-10 RX ORDER — NIRMATRELVIR AND RITONAVIR 150-100 MG
KIT ORAL
Refills: 0 | Status: ACTIVE | COMMUNITY

## 2023-11-10 RX ORDER — IBANDRONATE SODIUM 150 MG/1
150 TABLET ORAL
Refills: 0 | Status: ACTIVE | COMMUNITY

## 2023-11-10 RX ORDER — METHYLPREDNISOLONE 4 MG/1
4 TABLET ORAL
Qty: 1 | Refills: 0 | Status: ACTIVE | COMMUNITY
Start: 2023-11-10 | End: 1900-01-01

## 2023-11-10 RX ORDER — OMEPRAZOLE 40 MG/1
40 CAPSULE, DELAYED RELEASE ORAL
Refills: 0 | Status: ACTIVE | COMMUNITY

## 2023-11-10 RX ORDER — AZITHROMYCIN 250 MG/1
250 TABLET, FILM COATED ORAL
Refills: 0 | Status: ACTIVE | COMMUNITY

## 2023-11-10 RX ORDER — ALPRAZOLAM 0.5 MG/1
0.5 TABLET, ORALLY DISINTEGRATING ORAL
Refills: 0 | Status: ACTIVE | COMMUNITY

## 2023-11-10 RX ORDER — IBUPROFEN 600 MG/1
600 TABLET, FILM COATED ORAL
Refills: 0 | Status: ACTIVE | COMMUNITY

## 2023-11-10 RX ORDER — SERTRALINE HYDROCHLORIDE 100 MG/1
100 TABLET, FILM COATED ORAL
Refills: 0 | Status: ACTIVE | COMMUNITY

## 2023-11-10 RX ORDER — OMEPRAZOLE 20 MG/1
20 CAPSULE, DELAYED RELEASE ORAL
Refills: 0 | Status: ACTIVE | COMMUNITY

## 2023-11-12 ENCOUNTER — RESULT REVIEW (OUTPATIENT)
Age: 78
End: 2023-11-12

## 2024-02-23 NOTE — PROGRESS NOTE ADULT - ASSESSMENT
Occupational Therapy    Patient not seen in therapy. Unavailable due to request no therapy today. Re-attempt plan: tomorrow    No reason given        OBJECTIVE                       Documented in the chart in the following areas: Assessment/Plan.       Therapy procedure time and total treatment time can be found documented on the Time Entry flowsheet 74F with no PMHx and PSHx of R TKR 15 years ago and left TKR this past Friday (1/24) at [place] presenting with 4 days of abdominal pain and distension in the setting of constipation and a history of diverticulosis. Found to have significant pneumoperitoneum on CTAP concerning for perforated bowel, although the source of the perforation remains unclear, it is likely from diverticuli. Now s/p exploratory laparotomy, Salas procedure and left oophorectomy (1/30).     - Zosyn for intra-abdominal infection   - regular diet   - Gas and stool in ostomy bag  - Wound VAC change   - continue excellent care per SICU    [id number]  ACS

## 2024-06-27 ENCOUNTER — APPOINTMENT (OUTPATIENT)
Dept: ORTHOPEDIC SURGERY | Facility: CLINIC | Age: 79
End: 2024-06-27
Payer: MEDICARE

## 2024-06-27 VITALS — WEIGHT: 123 LBS | HEIGHT: 63 IN | BODY MASS INDEX: 21.79 KG/M2

## 2024-06-27 DIAGNOSIS — M70.61 TROCHANTERIC BURSITIS, RIGHT HIP: ICD-10-CM

## 2024-06-27 DIAGNOSIS — M16.11 UNILATERAL PRIMARY OSTEOARTHRITIS, RIGHT HIP: ICD-10-CM

## 2024-06-27 DIAGNOSIS — M48.061 SPINAL STENOSIS, LUMBAR REGION WITHOUT NEUROGENIC CLAUDICATION: ICD-10-CM

## 2024-06-27 PROCEDURE — 20610 DRAIN/INJ JOINT/BURSA W/O US: CPT | Mod: RT

## 2024-06-27 PROCEDURE — 99214 OFFICE O/P EST MOD 30 MIN: CPT | Mod: 25

## 2024-06-27 PROCEDURE — 73502 X-RAY EXAM HIP UNI 2-3 VIEWS: CPT

## 2024-07-12 ENCOUNTER — APPOINTMENT (OUTPATIENT)
Dept: UROGYNECOLOGY | Facility: CLINIC | Age: 79
End: 2024-07-12
Payer: MEDICARE

## 2024-07-12 DIAGNOSIS — K57.90 DIVERTICULOSIS OF INTESTINE, PART UNSPECIFIED, W/OUT PERFORATION OR ABSCESS W/OUT BLEEDING: ICD-10-CM

## 2024-07-12 DIAGNOSIS — N95.2 POSTMENOPAUSAL ATROPHIC VAGINITIS: ICD-10-CM

## 2024-07-12 DIAGNOSIS — R39.15 URGENCY OF URINATION: ICD-10-CM

## 2024-07-12 DIAGNOSIS — N39.41 URGE INCONTINENCE: ICD-10-CM

## 2024-07-12 DIAGNOSIS — N95.8 OTHER SPECIFIED MENOPAUSAL AND PERIMENOPAUSAL DISORDERS: ICD-10-CM

## 2024-07-12 DIAGNOSIS — R35.0 FREQUENCY OF MICTURITION: ICD-10-CM

## 2024-07-12 LAB
BILIRUB UR QL STRIP: NORMAL
CLARITY UR: CLEAR
COLLECTION METHOD: NORMAL
GLUCOSE UR-MCNC: NORMAL
HCG UR QL: 0.2 EU/DL
HGB UR QL STRIP.AUTO: ABNORMAL
KETONES UR-MCNC: NORMAL
NITRITE UR QL STRIP: NORMAL
PH UR STRIP: 6
PROT UR STRIP-MCNC: NORMAL
SP GR UR STRIP: 1.02

## 2024-07-12 PROCEDURE — 99459 PELVIC EXAMINATION: CPT

## 2024-07-12 PROCEDURE — 99204 OFFICE O/P NEW MOD 45 MIN: CPT | Mod: 25

## 2024-07-12 PROCEDURE — 51701 INSERT BLADDER CATHETER: CPT

## 2024-07-12 RX ORDER — LACTOBACILLUS RHAMNOSUS GG 10B CELL
CAPSULE ORAL
Refills: 0 | Status: ACTIVE | COMMUNITY

## 2024-07-12 RX ORDER — SERTRALINE HYDROCHLORIDE 200 MG/1
200 CAPSULE ORAL
Refills: 0 | Status: ACTIVE | COMMUNITY

## 2024-07-12 RX ORDER — IBANDRONATE SODIUM 150 MG/1
150 TABLET, FILM COATED ORAL
Refills: 0 | Status: ACTIVE | COMMUNITY

## 2024-07-12 RX ORDER — SIMETHICONE 125 MG
CAPSULE ORAL
Refills: 0 | Status: ACTIVE | COMMUNITY

## 2024-07-12 RX ORDER — ESTRADIOL 10 UG/1
10 TABLET, FILM COATED VAGINAL
Qty: 24 | Refills: 3 | Status: ACTIVE | COMMUNITY
Start: 2024-07-12 | End: 1900-01-01

## 2024-07-14 LAB
APPEARANCE: ABNORMAL
BACTERIA UR CULT: NORMAL
BACTERIA: NEGATIVE /HPF
BILIRUBIN URINE: NEGATIVE
BLOOD URINE: NEGATIVE
COLOR: YELLOW
GLUCOSE QUALITATIVE U: NEGATIVE MG/DL
HYALINE CASTS: NORMAL
KETONES URINE: NEGATIVE MG/DL
LEUKOCYTE ESTERASE URINE: NEGATIVE
MICROSCOPIC-UA: NORMAL
NITRITE URINE: NEGATIVE
PH URINE: 6.5
PROTEIN URINE: 30 MG/DL
RED BLOOD CELLS URINE: 0 /HPF
SPECIFIC GRAVITY URINE: >1.03
SQUAMOUS EPITHELIAL CELLS: PRESENT
UROBILINOGEN URINE: 1 MG/DL
WHITE BLOOD CELLS URINE: 0 /HPF

## 2024-08-29 NOTE — PHYSICAL THERAPY INITIAL EVALUATION ADULT - GENERAL OBSERVATIONS, REHAB EVAL
Detail Level: Generalized
Pt encountered seated in chair in NAD, dressing hemovac and JEET intact
Simponi Counseling:  I discussed with the patient the risks of golimumab including but not limited to myelosuppression, immunosuppression, autoimmune hepatitis, demyelinating diseases, lymphoma, and serious infections.  The patient understands that monitoring is required including a PPD at baseline and must alert us or the primary physician if symptoms of infection or other concerning signs are noted.

## 2024-11-06 ENCOUNTER — APPOINTMENT (OUTPATIENT)
Dept: UROGYNECOLOGY | Facility: CLINIC | Age: 79
End: 2024-11-06

## 2024-12-10 NOTE — H&P PST ADULT - CONSTITUTIONAL
[facility] is calling requesting Nursing for evaluation to be able to see patient.  Please advise  Please call [facility] with any questions [phone].  Please fax order and progress notes to [phone]     Well-developed, well nourished

## 2025-05-24 ENCOUNTER — NON-APPOINTMENT (OUTPATIENT)
Age: 80
End: 2025-05-24

## 2025-05-27 ENCOUNTER — NON-APPOINTMENT (OUTPATIENT)
Age: 80
End: 2025-05-27
